# Patient Record
Sex: FEMALE | Race: WHITE | Employment: OTHER | URBAN - METROPOLITAN AREA
[De-identification: names, ages, dates, MRNs, and addresses within clinical notes are randomized per-mention and may not be internally consistent; named-entity substitution may affect disease eponyms.]

---

## 2017-01-13 ENCOUNTER — OFFICE VISIT (OUTPATIENT)
Dept: CARDIOLOGY | Facility: CLINIC | Age: 72
End: 2017-01-13
Attending: INTERNAL MEDICINE
Payer: COMMERCIAL

## 2017-01-13 VITALS
HEART RATE: 60 BPM | HEIGHT: 67 IN | BODY MASS INDEX: 22.6 KG/M2 | WEIGHT: 144 LBS | DIASTOLIC BLOOD PRESSURE: 72 MMHG | SYSTOLIC BLOOD PRESSURE: 136 MMHG

## 2017-01-13 DIAGNOSIS — I47.10 PAROXYSMAL SUPRAVENTRICULAR TACHYCARDIA (H): ICD-10-CM

## 2017-01-13 PROCEDURE — 99213 OFFICE O/P EST LOW 20 MIN: CPT | Performed by: INTERNAL MEDICINE

## 2017-01-13 NOTE — PROGRESS NOTES
REASON FOR CLINIC VISIT:  Followup palpitations.      HISTORY OF PRESENT ILLNESS:  Ms. Lopez is a very pleasant 71-year-old female with a history of SVT back in 2011 who I saw in 11/2015 at [facility] when she was admitted with palpitations that lasted for several hours.  By the time she came to the ER, she was in sinus rhythm.  She had mild flat troponin elevation and underwent a Lexiscan stress test which showed normal perfusion.  Normal LVEF.  Telemetry in the hospital did not reveal any arrhythmia.  She was discharged on a 30-day event monitor that showed only sinus rhythm.  She has been seen in followup and has on quite well.  I am seeing her in about a year's time and she had only 1 episode of palpitations few days ago and she actually put her head in ice water and the palpitations disappeared.  Otherwise, she feels quite well.  She has changed her gym and walks quite a lot and climb stairs and with none of the physical activity she has noticed any chest discomfort or shortness of breath.  To be noted, she has a very strong family history of premature coronary artery disease with father having an MI at age 40 and several brothers with angina and angioplasty in 50s.  She herself has other comorbidities of hypertension and dyslipidemia and is on metoprolol and simvastatin.      PHYSICAL EXAMINATION:   VITAL SIGNS:  Blood pressure 136/72, heart 60, regular, weight 144 pounds, BMI 22.6.   GENERAL:  The patient appears pleasant, comfortable.   NECK:  Normal JVP, no bruit.   CARDIOVASCULAR SYSTEM:  S1, S2 normal, no murmur, rub or gallop.   RESPIRATORY SYSTEM:  Clear to auscultation bilaterally.   ABDOMEN:  Soft, nontender.   EXTREMITIES:  No pitting pedal edema.   NEUROLOGICAL:  Alert, oriented x3.   PSYCH:  Normal affect.   SKIN:  No obvious rash.   HEENT:  No pallor or icterus.      IMPRESSION AND PLAN:  A very delightful 71-year-old female with history of SVT in 2011 with very few breakthrough  episodes; 1 episode in the last 1 year and that responded to aborting manually as noted above.  Overall, she is quite happy with her quality of life.  We have discussed the option of ablation in the past, but for now we will just continue low-dose beta blocker.  I offered to see her on an as-needed basis but patient would prefer to come back and see us in 1 year's time and I would be very glad to see her.  In the interim, if she notices any more episodes, she will call our clinic.         ERICA NERI MD             D: 2017 10:03   T: 2017 14:03   MT: REGAN      Name:     AYALA DILLARD   MRN:      [id number]        Account:      [id number]   :      1945           Service Date: 2017      Document: [id number]

## 2017-01-13 NOTE — Clinical Note
1/13/2017    Soco Durham MD  [facility]   [address]  [facility] [address]    RE: Lisa Lopez       Dear Colleague,    I had the pleasure of seeing Lisa Lopez in the [facility].    REASON FOR CLINIC VISIT:  Followup palpitations.      Ms. Lopze is a very pleasant 71-year-old female with a history of SVT back in 2011 who I saw in 11/2015 at [facility] when she was admitted with palpitations that lasted for several hours.  By the time she came to the ER, she was in sinus rhythm.  She had mild flat troponin elevation and underwent a Lexiscan stress test which showed normal perfusion.  Normal LVEF.  Telemetry in the hospital did not reveal any arrhythmia.  She was discharged on a 30-day event monitor that showed only sinus rhythm.  She has been seen in followup and has on quite well.  I am seeing her in about a year's time and she had only 1 episode of palpitations few days ago and she actually put her head in ice water and the palpitations disappeared.  Otherwise, she feels quite well.  She has changed her gym and walks quite a lot and climb stairs and with none of the physical activity she has noticed any chest discomfort or shortness of breath.  To be noted, she has a very strong family history of premature coronary artery disease with father having an MI at age 40 and several brothers with angina and angioplasty in 50s.  She herself has other comorbidities of hypertension and dyslipidemia and is on metoprolol and simvastatin.      PHYSICAL EXAMINATION:   VITAL SIGNS:  Blood pressure 136/72, heart 60, regular, weight 144 pounds, BMI 22.6.   GENERAL:  The patient appears pleasant, comfortable.   NECK:  Normal JVP, no bruit.   CARDIOVASCULAR SYSTEM:  S1, S2 normal, no murmur, rub or gallop.   RESPIRATORY SYSTEM:  Clear to auscultation bilaterally.   ABDOMEN:  Soft, nontender.   EXTREMITIES:  No pitting pedal edema.    NEUROLOGICAL:  Alert, oriented x3.   PSYCH:  Normal affect.   SKIN:  No obvious rash.   HEENT:  No pallor or icterus.     Outpatient Encounter Prescriptions as of 1/13/2017   Medication Sig Dispense Refill     metoprolol (LOPRESSOR) 25 MG tablet Take 0.5 tablets (12.5 mg) by mouth 2 times daily 30 tablet 5     simvastatin (ZOCOR) 20 MG tablet Take 0.5 tablets (10 mg) by mouth At Bedtime 45 tablet 3     LORazepam (ATIVAN) 0.5 MG tablet Take 0.5 tablets (0.25 mg) by mouth daily 45 tablet 1     multivitamin, therapeutic with minerals (MULTI-VITAMIN) TABS Take 1 tablet by mouth daily 100 tablet 3     fish oil-omega-3 fatty acids (OMEGA 3) 1000 MG capsule Take 1 g by mouth 2 times daily  90 capsule      Cholecalciferol (VITAMIN D) 2000 UNITS tablet Take 2,000 Units by mouth daily 100 tablet 3     Coenzyme Q10 (CO Q 10) 100 MG CAPS Take 1 capsule by mouth daily        aspirin 81 MG tablet Take 81 mg by mouth daily  30 tablet      [DISCONTINUED] IBANdronate (BONIVA) 150 MG tablet Take 1 tablet (150 mg) by mouth every 30 days Take with over 8 ounces water and stay upright for at least 30 minutes after dose.  Take at least 60 minutes before breakfast 4 tablet 0     No facility-administered encounter medications on file as of 1/13/2017.      IMPRESSION AND PLAN:  A very delightful 71-year-old female with history of SVT in 2011 with very few breakthrough episodes; 1 episode in the last 1 year and that responded to aborting manually as noted above.  Overall, she is quite happy with her quality of life.  We have discussed the option of ablation in the past, but for now we will just continue low-dose beta blocker.  I offered to see her on an as-needed basis but patient would prefer to come back and see us in 1 year's time and I would be very glad to see her.  In the interim, if she notices any more episodes, she will call our clinic.     Again, thank you for allowing me to participate in the care of your patient.       Sincerely,    Gary Aviles MD     [facility]

## 2017-01-13 NOTE — PROGRESS NOTES
HPI and Plan:   See dictation    Orders Placed This Encounter   Procedures     Follow-Up with Cardiologist     No orders of the defined types were placed in this encounter.     Medications Discontinued During This Encounter   Medication Reason     IBANdronate (BONIVA) 150 MG tablet          Encounter Diagnosis   Name Primary?     Paroxysmal supraventricular tachycardia        CURRENT MEDICATIONS:  Current Outpatient Prescriptions   Medication Sig Dispense Refill     metoprolol (LOPRESSOR) 25 MG tablet Take 0.5 tablets (12.5 mg) by mouth 2 times daily 30 tablet 5     simvastatin (ZOCOR) 20 MG tablet Take 0.5 tablets (10 mg) by mouth At Bedtime 45 tablet 3     LORazepam (ATIVAN) 0.5 MG tablet Take 0.5 tablets (0.25 mg) by mouth daily 45 tablet 1     multivitamin, therapeutic with minerals (MULTI-VITAMIN) TABS Take 1 tablet by mouth daily 100 tablet 3     fish oil-omega-3 fatty acids (OMEGA 3) 1000 MG capsule Take 1 g by mouth 2 times daily  90 capsule      Cholecalciferol (VITAMIN D) 2000 UNITS tablet Take 2,000 Units by mouth daily 100 tablet 3     Coenzyme Q10 (CO Q 10) 100 MG CAPS Take 1 capsule by mouth daily        aspirin 81 MG tablet Take 81 mg by mouth daily  30 tablet        ALLERGIES   No Known Allergies    PAST MEDICAL HISTORY:  Past Medical History   Diagnosis Date     Cough 4/15/2014     Dysthymia 4/15/2014     Essential hypertension, benign 4/15/2014     Hypercholesteremia 4/15/2014     Paroxysmal supraventricular tachycardia (H) 4/15/2014       PAST SURGICAL HISTORY:  Past Surgical History   Procedure Laterality Date     Hysterectomy, pap no longer indicated         FAMILY HISTORY:  Family History   Problem Relation Age of Onset     DIABETES Father 60      66 yo     HEART DISEASE Mother       66 yo      HEART DISEASE Brother       75 yo     HEART DISEASE Brother       76 yo     HEART DISEASE Brother       76 yo      HEART DISEASE Brother 67     Triple bypass      Family History Negative Sister       62 yo MVA      Family History Negative Sister      Family History Negative Daughter      Family History Negative Daughter        SOCIAL HISTORY:  Social History     Social History     Marital Status:      Spouse Name: N/A     Number of Children: N/A     Years of Education: N/A     Occupational History      Retired     Social History Main Topics     Smoking status: Former Smoker     Start date: 04/15/1974     Smokeless tobacco: None      Comment: Smoked [age] yo.     Alcohol Use: 0.0 oz/week     0 Standard drinks or equivalent per week     Drug Use: No     Sexual Activity:     Partners: Male     Other Topics Concern     Caffeine Concern No     not caffeine      Special Diet No     regular diet      Exercise Yes     3 times a week at gym      Social History Narrative       Review of Systems:  Skin:  Negative     Eyes:  Negative    ENT:  Negative    Respiratory:  Negative    Cardiovascular:  Negative for    Gastroenterology: Negative for    Genitourinary:  Negative for    Musculoskeletal:  Negative for    Neurologic:  Negative for    Psychiatric:  Positive for sleep disturbances  Heme/Lymph/Imm:  Negative    Endocrine:  Negative for        Recent Lab Results:  LIPID RESULTS:  Lab Results   Component Value Date    CHOL 177 2016    HDL 58 2016    LDL 99 2016    TRIG 98 2016    CHOLHDLRATIO 3.4 2015       LIVER ENZYME RESULTS:  Lab Results   Component Value Date    AST 20 2016    ALT 31 2016       CBC RESULTS:  Lab Results   Component Value Date    WBC 6.0 2016    RBC 4.40 2016    HGB 13.7 2016    HCT 41.4 2016    MCV 94 2016    MCH 31.1 2016    MCHC 33.1 2016    RDW 12.8 2016     2016       BMP RESULTS:  Lab Results   Component Value Date     2016    POTASSIUM 4.2 2016    CHLORIDE 106 2016    CO2 26 2016    ANIONGAP 8 2016    GLC 85  11/09/2016    BUN 17 11/09/2016    CR 0.85 11/09/2016    GFRESTIMATED 65 11/09/2016    GFRESTBLACK 79 11/09/2016    MARIA DEL CARMEN 9.4 11/09/2016        A1C RESULTS:  No results found for: A1C    INR RESULTS:  No results found for: INR        CC  Gary Aviles MD   [facility]  [address]  [place] [id number]

## 2017-01-17 ENCOUNTER — TELEPHONE (OUTPATIENT)
Dept: INTERNAL MEDICINE | Facility: CLINIC | Age: 72
End: 2017-01-17

## 2017-01-17 NOTE — TELEPHONE ENCOUNTER
Call Regarding Preventive Health Screening Colonoscopy    Attempt 1    Message on voicemail     Comments:       Outreach   Clementina Wagoner

## 2017-01-25 NOTE — TELEPHONE ENCOUNTER
1/24/2017    Call Regarding Preventive Health Screening Colonoscopy    Attempt 2    Message     Comments: SCHEDULED        Outreach   Aidee Rao

## 2017-02-27 ENCOUNTER — MYC REFILL (OUTPATIENT)
Dept: INTERNAL MEDICINE | Facility: CLINIC | Age: 72
End: 2017-02-27

## 2017-02-27 DIAGNOSIS — F34.1 DYSTHYMIA: ICD-10-CM

## 2017-03-01 RX ORDER — LORAZEPAM 0.5 MG/1
0.25 TABLET ORAL DAILY
Qty: 45 TABLET | Refills: 1 | Status: SHIPPED | OUTPATIENT
Start: 2017-03-01 | End: 2017-09-05

## 2017-03-01 NOTE — TELEPHONE ENCOUNTER
Ativan      Last Written Prescription Date:  9/6/16  Last Fill Quantity: 45,   # refills: 1  Last Office Visit with [facility] prescribing provider: 11/9/16  Future Office visit:       Routing refill request to provider for review/approval because:  Drug not on the [facility] refill protocol or controlled substance

## 2017-03-01 NOTE — TELEPHONE ENCOUNTER
Message from MyChart:  Original authorizing provider: MD Lisa Radford would like a refill of the following medications:  LORazepam (ATIVAN) 0.5 MG tablet [Soco Durham MD]    Preferred pharmacy: [facility] [address] [place] [address] [id number] AT [place] [address]    Comment:

## 2017-03-01 NOTE — TELEPHONE ENCOUNTER
Rx faxed to [facility].  JOSE MIGUEL Castellanos R.N.

## 2017-03-23 ENCOUNTER — HOSPITAL ENCOUNTER (OUTPATIENT)
Facility: CLINIC | Age: 72
Discharge: HOME | End: 2017-03-23
Attending: SURGERY | Admitting: SURGERY
Payer: MEDICARE

## 2017-03-23 ENCOUNTER — APPOINTMENT (OUTPATIENT)
Dept: SURGERY | Facility: PHYSICIAN GROUP | Age: 72
End: 2017-03-23
Payer: COMMERCIAL

## 2017-03-23 VITALS
OXYGEN SATURATION: 95 % | DIASTOLIC BLOOD PRESSURE: 68 MMHG | RESPIRATION RATE: 12 BRPM | SYSTOLIC BLOOD PRESSURE: 152 MMHG

## 2017-03-23 LAB — COLONOSCOPY: NORMAL

## 2017-03-23 PROCEDURE — 25000128 H RX IP 250 OP 636: Performed by: SURGERY

## 2017-03-23 PROCEDURE — 45380 COLONOSCOPY AND BIOPSY: CPT | Mod: PT | Performed by: SURGERY

## 2017-03-23 PROCEDURE — 45385 COLONOSCOPY W/LESION REMOVAL: CPT | Mod: PT | Performed by: SURGERY

## 2017-03-23 PROCEDURE — G0500 MOD SEDAT ENDO SERVICE >5YRS: HCPCS | Performed by: SURGERY

## 2017-03-23 PROCEDURE — 45380 COLONOSCOPY AND BIOPSY: CPT | Mod: XU,PT | Performed by: SURGERY

## 2017-03-23 PROCEDURE — 25000125 ZZHC RX 250: Performed by: SURGERY

## 2017-03-23 PROCEDURE — 45385 COLONOSCOPY W/LESION REMOVAL: CPT | Performed by: SURGERY

## 2017-03-23 PROCEDURE — 88305 TISSUE EXAM BY PATHOLOGIST: CPT | Mod: 26 | Performed by: SURGERY

## 2017-03-23 PROCEDURE — 88305 TISSUE EXAM BY PATHOLOGIST: CPT | Performed by: SURGERY

## 2017-03-23 PROCEDURE — 99153 MOD SED SAME PHYS/QHP EA: CPT | Performed by: SURGERY

## 2017-03-23 RX ORDER — FENTANYL CITRATE 50 UG/ML
INJECTION, SOLUTION INTRAMUSCULAR; INTRAVENOUS PRN
Status: DISCONTINUED | OUTPATIENT
Start: 2017-03-23 | End: 2017-03-23 | Stop reason: HOSPADM

## 2017-03-23 RX ORDER — ONDANSETRON 4 MG/1
4 TABLET, ORALLY DISINTEGRATING ORAL EVERY 6 HOURS PRN
Status: DISCONTINUED | OUTPATIENT
Start: 2017-03-23 | End: 2017-03-23 | Stop reason: HOSPADM

## 2017-03-23 RX ORDER — LIDOCAINE 40 MG/G
CREAM TOPICAL
Status: DISCONTINUED | OUTPATIENT
Start: 2017-03-23 | End: 2017-03-23 | Stop reason: HOSPADM

## 2017-03-23 RX ORDER — ONDANSETRON 2 MG/ML
4 INJECTION INTRAMUSCULAR; INTRAVENOUS EVERY 6 HOURS PRN
Status: DISCONTINUED | OUTPATIENT
Start: 2017-03-23 | End: 2017-03-23 | Stop reason: HOSPADM

## 2017-03-23 RX ORDER — NALOXONE HYDROCHLORIDE 0.4 MG/ML
.1-.4 INJECTION, SOLUTION INTRAMUSCULAR; INTRAVENOUS; SUBCUTANEOUS
Status: DISCONTINUED | OUTPATIENT
Start: 2017-03-23 | End: 2017-03-23 | Stop reason: HOSPADM

## 2017-03-23 RX ORDER — ONDANSETRON 2 MG/ML
4 INJECTION INTRAMUSCULAR; INTRAVENOUS
Status: DISCONTINUED | OUTPATIENT
Start: 2017-03-23 | End: 2017-03-23 | Stop reason: HOSPADM

## 2017-03-23 RX ORDER — FLUMAZENIL 0.1 MG/ML
0.2 INJECTION, SOLUTION INTRAVENOUS
Status: DISCONTINUED | OUTPATIENT
Start: 2017-03-23 | End: 2017-03-23 | Stop reason: HOSPADM

## 2017-03-23 NOTE — DISCHARGE INSTRUCTIONS
Understanding Colon and Rectal Polyps     The colon has a smooth lining composed of millions of cells.     The colon (also called the large intestine) is a muscular tube that forms the last part of the digestive tract. It absorbs water and stores food waste. The colon is about 4 to 6 feet long. The rectum is the last 6 inches of the colon. The colon and rectum have a smooth lining composed of millions of cells. Changes in these cells can lead to growths in the colon that can become cancerous and should be removed.     When the Colon Lining Changes  Changes that occur in the cells that line the colon or rectum can lead to growths called polyps. Over a period of years, polyps can turn cancerous. Removing polyps early may prevent cancer from ever forming.      Polyps  Polyps are fleshy clumps of tissue that form on the lining of the colon or rectum. Small polyps are usually benign (not cancerous). However, over time, cells in a polyp can change and become cancerous. The larger a polyp grows, the more likely this is to happen. Also, certain types of polyps known as adenomatous polyps are considered premalignant. This means that they will almost always become cancerous if they re not removed.          Cancer  Almost all colorectal cancers start when polyp cells begin growing abnormally. As a cancerous tumor grows, it may involve more and more of the colon or rectum. In time, cancer can also grow beyond the colon or rectum and spread to nearby organs or to glands called lymph nodes. The cells can also travel to other parts of the body. This is known as metastasis. The earlier a cancerous tumor is removed, the better the chance of preventing its spread.        [id number] [facility], [address] [place] [address], [place] [address]. All rights reserved. This information is not intended as a substitute for professional medical care. Always follow your healthcare professional's instructions.

## 2017-03-23 NOTE — H&P
Pre-Endoscopy History and Physical     Lisa Lopez MRN# [id number]   YOB: 1945 Age: 71 year old     Date of Procedure: 3/23/2017  Primary care provider: Soco Durham  Type of Endoscopy: Colonoscopy with possible biopsy, possible polypectomy  Reason for Procedure: screening   Type of Anesthesia Anticipated: Conscious Sedation    HPI:    Lisa is a 71 year old female who will be undergoing the above procedure.      A history and physical has been performed. The patient's medications and allergies have been reviewed. The risks and benefits of the procedure and the sedation options and risks were discussed with the patient.  All questions were answered and informed consent was obtained.      She denies a personal or family history of anesthesia complications or bleeding disorders.     Patient Active Problem List   Diagnosis     Advance care planning     Paroxysmal supraventricular tachycardia (H)     Hypercholesteremia     Dysthymia     Essential hypertension, benign     Osteoporosis        Past Medical History:   Diagnosis Date     Cough 4/15/2014     Dysthymia 4/15/2014     Essential hypertension, benign 4/15/2014     Hypercholesteremia 4/15/2014     Paroxysmal supraventricular tachycardia (H) 4/15/2014        Past Surgical History:   Procedure Laterality Date     HYSTERECTOMY, PAP NO LONGER INDICATED         Social History   Substance Use Topics     Smoking status: Former Smoker     Start date: 4/15/1974     Smokeless tobacco: Not on file      Comment: Smoked [age] yo.     Alcohol use 0.0 oz/week     0 Standard drinks or equivalent per week       Family History   Problem Relation Age of Onset     DIABETES Father 60      66 yo     HEART DISEASE Mother       66 yo      HEART DISEASE Brother       77 yo     HEART DISEASE Brother       78 yo     HEART DISEASE Brother       78 yo      HEART DISEASE Brother 67     Triple bypass     Family History Negative  "Sister       62 yo MVA      Family History Negative Sister      Family History Negative Daughter      Family History Negative Daughter        Prior to Admission medications    Medication Sig Start Date End Date Taking? Authorizing Provider   LORazepam (ATIVAN) 0.5 MG tablet Take 0.5 tablets (0.25 mg) by mouth daily 3/1/17  Yes Soco Durham MD   metoprolol (LOPRESSOR) 25 MG tablet Take 0.5 tablets (12.5 mg) by mouth 2 times daily 16  Yes Soco Durham MD   simvastatin (ZOCOR) 20 MG tablet Take 0.5 tablets (10 mg) by mouth At Bedtime 10/26/16  Yes Soco Durham MD   multivitamin, therapeutic with minerals (MULTI-VITAMIN) TABS Take 1 tablet by mouth daily 4/15/14  Yes Soco Durham MD   fish oil-omega-3 fatty acids (OMEGA 3) 1000 MG capsule Take 1 g by mouth 2 times daily  4/15/14  Yes Soco Durham MD   Cholecalciferol (VITAMIN D) 2000 UNITS tablet Take 2,000 Units by mouth daily 4/15/14  Yes Soco Durham MD   aspirin 81 MG tablet Take 81 mg by mouth daily  4/15/14  Yes Soco Durham MD   Coenzyme Q10 (CO Q 10) 100 MG CAPS Take 1 capsule by mouth daily  4/15/14   Soco Durham MD       No Known Allergies     REVIEW OF SYSTEMS:   5 point ROS negative except as noted above in HPI, including Gen., Resp., CV, GI &  system review.    PHYSICAL EXAM:   There were no vitals taken for this visit. Estimated body mass index is 22.55 kg/(m^2) as calculated from the following:    Height as of 17: 1.702 m (5' 7\").    Weight as of 17: 65.3 kg (144 lb).   GENERAL APPEARANCE: alert, and oriented  MENTAL STATUS: alert  AIRWAY EXAM: Mallampatti Class III (visualization of the soft palate and base of uvula)  RESP: lungs clear to auscultation - no rales, rhonchi or wheezes  CV: regular rates and rhythm  DIAGNOSTICS:    Not indicated    IMPRESSION   ASA Class 3 - Severe systemic disease, but not incapacitating    PLAN:   Plan for Colonoscopy with possible biopsy, " possible polypectomy. We discussed the risks, benefits and alternatives and the patient wished to proceed.      Signed Electronically by: Devante Sanchez  March 23, 2017

## 2017-03-24 LAB — COPATH REPORT: NORMAL

## 2017-05-03 DIAGNOSIS — I47.10 PAROXYSMAL SUPRAVENTRICULAR TACHYCARDIA (H): ICD-10-CM

## 2017-05-03 RX ORDER — METOPROLOL TARTRATE 25 MG/1
12.5 TABLET, FILM COATED ORAL 2 TIMES DAILY
Qty: 30 TABLET | Refills: 5 | Status: SHIPPED | OUTPATIENT
Start: 2017-05-03 | End: 2017-11-06

## 2017-05-03 NOTE — TELEPHONE ENCOUNTER
Metoprolol      Last Written Prescription Date: 11/16/16  Last Fill Quantity: 30, # refills: 5  [facility] Office Visit with [facility] prescribing provider: 11/9/16       Potassium   Date Value Ref Range Status   11/09/2016 4.2 3.4 - 5.3 mmol/L Final     Creatinine   Date Value Ref Range Status   11/09/2016 0.85 0.52 - 1.04 mg/dL Final     BP Readings from Last 3 Encounters:   03/23/17 152/68   01/13/17 136/72   11/09/16 124/84

## 2017-06-01 ENCOUNTER — MYC MEDICAL ADVICE (OUTPATIENT)
Dept: INTERNAL MEDICINE | Facility: CLINIC | Age: 72
End: 2017-06-01

## 2017-06-14 ENCOUNTER — OFFICE VISIT (OUTPATIENT)
Dept: INTERNAL MEDICINE | Facility: CLINIC | Age: 72
End: 2017-06-14
Payer: COMMERCIAL

## 2017-06-14 VITALS — DIASTOLIC BLOOD PRESSURE: 80 MMHG | SYSTOLIC BLOOD PRESSURE: 140 MMHG

## 2017-06-14 DIAGNOSIS — M81.0 OSTEOPOROSIS: Primary | ICD-10-CM

## 2017-06-14 PROCEDURE — 99213 OFFICE O/P EST LOW 20 MIN: CPT | Performed by: INTERNAL MEDICINE

## 2017-06-14 NOTE — NURSING NOTE
"Chief Complaint   Patient presents with     RECHECK     She was on Ibandronate sodium and here for a follow up. She has toothace from this medication and would like to switch to a different med.       Initial Pulse (P) 68  Temp (P) 98.3  F (36.8  C) (Oral)  Ht (P) 5' 5.75\" (1.67 m)  Wt (P) 145 lb (65.8 kg)  SpO2 (P) 97%  BMI (P) 23.58 kg/m2 Estimated body mass index is 23.58 kg/(m^2) (pended) as calculated from the following:    Height as of this encounter: (P) 5' 5.75\" (1.67 m).    Weight as of this encounter: (P) 145 lb (65.8 kg).  Medication Reconciliation: complete   Zahra Enriquez MA      "

## 2017-06-14 NOTE — MR AVS SNAPSHOT
After Visit Summary   6/14/2017    Lisa Lopez    MRN: [id number]           Patient Information     Date Of Birth          1945        Visit Information        Provider Department      6/14/2017 8:00 AM Soco Durham MD [facility]        Today's Diagnoses     Osteoporosis    -  1       Follow-ups after your visit        Future tests that were ordered for you today     Open Future Orders        Priority Expected Expires Ordered    DX Hip/Pelvis/Spine Routine  6/14/2018 6/14/2017            Who to contact     If you have questions or need follow up information about today's clinic visit or your schedule please contact [facility] directly at [phone].  Normal or non-critical lab and imaging results will be communicated to you by MyChart, letter or phone within 4 business days after the clinic has received the results. If you do not hear from us within 7 days, please contact the clinic through [email or web address] or phone. If you have a critical or abnormal lab result, we will notify you by phone as soon as possible.  Submit refill requests through [employer] or call your pharmacy and they will forward the refill request to us. Please allow 3 business days for your refill to be completed.          Additional Information About Your Visit        MyChart Information     [employer] gives you secure access to your electronic health record. If you see a primary care provider, you can also send messages to your care team and make appointments. If you have questions, please call your primary care clinic.  If you do not have a primary care provider, please call [phone] and they will assist you.        Care EveryWhere ID     This is your Care EveryWhere ID. This could be used by other organizations to access your [facility] medical records  [id number]         Blood Pressure from Last 3 Encounters:   06/14/17 140/80   03/23/17 152/68   01/13/17 136/72    Weight from Last 3  Encounters:   06/14/17 (P) 145 lb (65.8 kg)   01/13/17 144 lb (65.3 kg)   11/09/16 142 lb (64.4 kg)               Primary Care Provider Office Phone # Fax #    Soco Durham [phone]       [facility] [address]  [facility] [address]        Thank you!     Thank you for choosing [facility]  for your care. Our goal is always to provide you with excellent care. Hearing back from our patients is one way we can continue to improve our services. Please take a few minutes to complete the written survey that you may receive in the mail after your visit with us. Thank you!             Your Updated Medication List - Protect others around you: Learn how to safely use, store and throw away your medicines at www.disposemymeds.org.          This list is accurate as of: 6/14/17 11:35 AM.  Always use your most recent med list.                   Brand Name Dispense Instructions for use    aspirin 81 MG tablet     30 tablet    Take 81 mg by mouth daily       Co Q 10 100 MG Caps      Take 1 capsule by mouth daily       fish oil-omega-3 fatty acids 1000 MG capsule     90 capsule    Take 1 g by mouth 2 times daily       LORazepam 0.5 MG tablet    ATIVAN    45 tablet    Take 0.5 tablets (0.25 mg) by mouth daily       metoprolol 25 MG tablet    LOPRESSOR    30 tablet    Take 0.5 tablets (12.5 mg) by mouth 2 times daily       Multi-vitamin Tabs tablet     100 tablet    Take 1 tablet by mouth daily       simvastatin 20 MG tablet    ZOCOR    45 tablet    Take 0.5 tablets (10 mg) by mouth At Bedtime       vitamin D 2000 UNITS tablet     100 tablet    Take 2,000 Units by mouth daily

## 2017-06-14 NOTE — PROGRESS NOTES
SUBJECTIVE:                                                    Lisa Lopez is a 71 year old female who presents to clinic today for the following health issues:    She was on Ibandronate Sodium and it's making her tooth ache. She would like to switch to a new medication.    HPI:   She says her toothache is better had it a couple times after she stopped the medication in February. But is not sure just when her last toothache was.     She does not recall what she was on before she started the Boniva. Thinks she was on something prior that may have been stopped because of heartburn. She has been on Boniva since . She has not had a DEXA since .    Problem list and histories reviewed & adjusted, as indicated.  Additional history: as documented    Patient Active Problem List   Diagnosis     Advance care planning     Paroxysmal supraventricular tachycardia (H)     Hypercholesteremia     Dysthymia     Essential hypertension, benign     Osteoporosis     Past Surgical History:   Procedure Laterality Date     COLONOSCOPY N/A 3/23/2017    Procedure: COMBINED COLONOSCOPY, SINGLE OR MULTIPLE BIOPSY/POLYPECTOMY BY BIOPSY;  Surgeon: Devante Sanchez MD;  Location: [facility]     HYSTERECTOMY, PAP NO LONGER INDICATED         Social History   Substance Use Topics     Smoking status: Former Smoker     Start date: 4/15/1974     Smokeless tobacco: Not on file      Comment: Smoked [age] yo.     Alcohol use 0.0 oz/week     0 Standard drinks or equivalent per week     Family History   Problem Relation Age of Onset     DIABETES Father 60      68 yo     HEART DISEASE Mother       68 yo      HEART DISEASE Brother       75 yo     HEART DISEASE Brother       78 yo     HEART DISEASE Brother       78 yo      HEART DISEASE Brother 67     Triple bypass     Family History Negative Sister       60 yo MVA      Family History Negative Sister      Family History Negative Daughter       "Family History Negative Daughter            Reviewed and updated as needed this visit by clinical staff  Tobacco  Allergies  Meds  Med Hx  Surg Hx  Fam Hx  Soc Hx      Reviewed and updated as needed this visit by Provider         ROS:  Constitutional, HEENT, cardiovascular, pulmonary, GI, , musculoskeletal, neuro, skin, endocrine and psych systems are negative, except as otherwise noted.    OBJECTIVE:                                                    /80 (BP Location: Right arm, Patient Position: Sitting, Cuff Size: Adult Regular)  Pulse (P) 68  Temp (P) 98.3  F (36.8  C) (Oral)  Ht (P) 5' 5.75\" (1.67 m)  Wt (P) 145 lb (65.8 kg)  SpO2 (P) 97%  BMI (P) 23.58 kg/m2  Body mass index is 23.58 kg/(m^2) (pended).  GENERAL: healthy, alert and no distress  RESP: lungs clear to auscultation - no rales, rhonchi or wheezes  CV: regular rate and rhythm, normal S1 S2, no S3 or S4, no murmur, click or rub, no peripheral edema and peripheral pulses strong  ABDOMEN: soft, nontender, no hepatosplenomegaly, no masses and bowel sounds normal  MS: no gross musculoskeletal defects noted, no edema         ASSESSMENT/PLAN:                                                        1. Osteoporosis  Will check DEXA if stable will consider drug holiday for a few years.   - DX Hip/Pelvis/Spine; Future    Soco Durham MD  [facility]    "

## 2017-06-15 ENCOUNTER — TELEPHONE (OUTPATIENT)
Dept: BONE DENSITY | Facility: CLINIC | Age: 72
End: 2017-06-15

## 2017-07-06 ENCOUNTER — RADIANT APPOINTMENT (OUTPATIENT)
Dept: BONE DENSITY | Facility: CLINIC | Age: 72
End: 2017-07-06
Attending: INTERNAL MEDICINE
Payer: COMMERCIAL

## 2017-07-06 DIAGNOSIS — M81.0 OSTEOPOROSIS: ICD-10-CM

## 2017-07-06 PROCEDURE — 77085 DXA BONE DENSITY AXL VRT FX: CPT | Performed by: INTERNAL MEDICINE

## 2017-08-14 ENCOUNTER — MYC MEDICAL ADVICE (OUTPATIENT)
Dept: INTERNAL MEDICINE | Facility: CLINIC | Age: 72
End: 2017-08-14

## 2017-08-14 ENCOUNTER — MYC REFILL (OUTPATIENT)
Dept: INTERNAL MEDICINE | Facility: CLINIC | Age: 72
End: 2017-08-14

## 2017-08-14 DIAGNOSIS — F34.1 DYSTHYMIA: ICD-10-CM

## 2017-08-14 RX ORDER — LORAZEPAM 0.5 MG/1
0.25 TABLET ORAL DAILY
Qty: 45 TABLET | Refills: 1 | Status: CANCELLED | OUTPATIENT
Start: 2017-08-14

## 2017-08-14 NOTE — TELEPHONE ENCOUNTER
Message from MyChart:  Original authorizing provider: MD Lisa Radford would like a refill of the following medications:  LORazepam (ATIVAN) 0.5 MG tablet [Soco Durham MD]    Preferred pharmacy: [facility] [address] [place] [address] [id number] AT [place] [address]    Comment:

## 2017-08-21 ENCOUNTER — TELEPHONE (OUTPATIENT)
Dept: INTERNAL MEDICINE | Facility: CLINIC | Age: 72
End: 2017-08-21

## 2017-08-21 NOTE — TELEPHONE ENCOUNTER
Panel Management Review      Patient has the following on her problem list:     Depression / Dysthymia review  PHQ-9 SCORE 5/14/2014 12/28/2015 10/19/2016   Total Score 0 - -   Total Score - 0 0      Patient is due for:  PHQ9 and DAP      IVD   ASA: Passed    Last LDL:    Lab Results   Component Value Date    CHOL 177 11/09/2016     Lab Results   Component Value Date    HDL 58 11/09/2016     Lab Results   Component Value Date    LDL 99 11/09/2016     Lab Results   Component Value Date    TRIG 98 11/09/2016        Lab Results   Component Value Date    CHOLHDLRATIO 3.4 08/18/2015        Is the patient on a Statin? YES   Is the patient on Aspirin? YES                  Medications     HMG CoA Reductase Inhibitors    simvastatin (ZOCOR) 20 MG tablet    Salicylates    aspirin 81 MG tablet          Last three blood pressure readings:  BP Readings from Last 3 Encounters:   06/14/17 140/80   03/23/17 152/68   01/13/17 136/72        Tobacco History:     History   Smoking Status     Former Smoker     Start date: 4/15/1974   Smokeless Tobacco     Not on file     Comment: Smoked 24-29 yo.       Hypertension   Last three blood pressure readings:  BP Readings from Last 3 Encounters:   06/14/17 140/80   03/23/17 152/68   01/13/17 136/72     Blood pressure: Passed    HTN Guidelines:  Age 18-59 BP range:  Less than 140/90  Age 60-85 with Diabetes:  Less than 140/90  Age 60-85 without Diabetes:  less than 150/90            Composite cancer screening  Chart review shows that this patient is due/due soon for the following None  Summary:    Patient is due/failing the following:   BP CHECK? Physical, DAP and PHQ9    Action needed:   Patient needs office visit for as above.     Type of outreach:    Phone, spoke to patient.  PHQ=0 Agrees to schedule next appointment for Medicare Wellness/BP check.    Questions for provider review:    None                                                                            MITCHELL Sorenson LPN                                                   Chart routed to none.

## 2017-09-05 DIAGNOSIS — F34.1 DYSTHYMIA: ICD-10-CM

## 2017-09-05 RX ORDER — LORAZEPAM 0.5 MG/1
0.25 TABLET ORAL DAILY
Qty: 45 TABLET | Refills: 1 | Status: SHIPPED | OUTPATIENT
Start: 2017-09-05 | End: 2018-02-26

## 2017-09-05 NOTE — TELEPHONE ENCOUNTER
Lorazepam      Last Written Prescription Date:  3/1/17  Last Fill Quantity: 45,   # refills: 1  Last Office Visit with [facility], [facility] or  Health prescribing provider: 6/14/17  Future Office visit:       Routing refill request to provider for review/approval because:  Drug not on the [facility], [facility] or  [facility] refill protocol or controlled substance

## 2017-09-19 ENCOUNTER — HOSPITAL ENCOUNTER (OUTPATIENT)
Dept: MAMMOGRAPHY | Facility: CLINIC | Age: 72
Discharge: HOME | End: 2017-09-19
Attending: INTERNAL MEDICINE | Admitting: INTERNAL MEDICINE
Payer: MEDICARE

## 2017-09-19 DIAGNOSIS — Z12.31 VISIT FOR SCREENING MAMMOGRAM: ICD-10-CM

## 2017-09-19 PROCEDURE — 77063 BREAST TOMOSYNTHESIS BI: CPT

## 2017-10-02 ENCOUNTER — E-VISIT (OUTPATIENT)
Dept: INTERNAL MEDICINE | Facility: CLINIC | Age: 72
End: 2017-10-02
Payer: COMMERCIAL

## 2017-10-02 DIAGNOSIS — R30.0 DYSURIA: Primary | ICD-10-CM

## 2017-10-02 PROCEDURE — 99444 ZZC PHYSICIAN ONLINE EVALUATION & MANAGEMENT SERVICE: CPT | Performed by: INTERNAL MEDICINE

## 2017-10-03 RX ORDER — SULFAMETHOXAZOLE/TRIMETHOPRIM 800-160 MG
1 TABLET ORAL 2 TIMES DAILY
Qty: 14 TABLET | Refills: 0 | Status: SHIPPED | OUTPATIENT
Start: 2017-10-03 | End: 2018-01-19

## 2017-10-31 DIAGNOSIS — E78.00 HYPERCHOLESTEREMIA: ICD-10-CM

## 2017-10-31 RX ORDER — SIMVASTATIN 20 MG
10 TABLET ORAL AT BEDTIME
Qty: 45 TABLET | Refills: 0 | Status: SHIPPED | OUTPATIENT
Start: 2017-10-31 | End: 2018-01-30

## 2017-10-31 NOTE — TELEPHONE ENCOUNTER
Simvastatin 10 mg     Last Written Prescription Date: 10/26/16  Last Fill Quantity: 45, # refills: 3  Last Office Visit with [facility], [facility] or [facility] prescribing provider: 6/14/17       Lab Results   Component Value Date    CHOL 177 11/09/2016     Lab Results   Component Value Date    HDL 58 11/09/2016     Lab Results   Component Value Date    LDL 99 11/09/2016     Lab Results   Component Value Date    TRIG 98 11/09/2016     Lab Results   Component Value Date    CHOLHDLRATIO 3.4 08/18/2015       Labs showing if normal/abnormal  Lab Results   Component Value Date    CHOL 177 11/09/2016    TRIG 98 11/09/2016    HDL 58 11/09/2016    LDL 99 11/09/2016    VLDL 34 (H) 08/18/2015    CHOLHDLRATIO 3.4 08/18/2015     Medication is being filled for 1 time refill only due to:  Patient needs labs Lipid.

## 2017-11-06 DIAGNOSIS — I47.10 PAROXYSMAL SUPRAVENTRICULAR TACHYCARDIA (H): ICD-10-CM

## 2017-11-07 RX ORDER — METOPROLOL TARTRATE 25 MG/1
12.5 TABLET, FILM COATED ORAL 2 TIMES DAILY
Qty: 30 TABLET | Refills: 0 | Status: SHIPPED | OUTPATIENT
Start: 2017-11-07 | End: 2017-12-06

## 2017-11-14 ENCOUNTER — E-VISIT (OUTPATIENT)
Dept: INTERNAL MEDICINE | Facility: CLINIC | Age: 72
End: 2017-11-14
Payer: COMMERCIAL

## 2017-11-14 DIAGNOSIS — R30.0 DYSURIA: Primary | ICD-10-CM

## 2017-11-14 PROCEDURE — 99207 ZZC NO CHARGE LOS: CPT | Performed by: INTERNAL MEDICINE

## 2017-11-14 NOTE — MR AVS SNAPSHOT
After Visit Summary   11/14/2017    Lisa Lopez    MRN: [id number]           Patient Information     Date Of Birth          1945        Visit Information        Provider Department      11/14/2017 11:38 AM Soco Durham MD [facility]        Today's Diagnoses     Dysuria    -  1       Follow-ups after your visit        Who to contact     If you have questions or need follow up information about today's clinic visit or your schedule please contact [facility] directly at [phone].  Normal or non-critical lab and imaging results will be communicated to you by [employer], letter or phone within 4 business days after the clinic has received the results. If you do not hear from us within 7 days, please contact the clinic through [employer] or phone. If you have a critical or abnormal lab result, we will notify you by phone as soon as possible.  Submit refill requests through [employer] or call your pharmacy and they will forward the refill request to us. Please allow 3 business days for your refill to be completed.          Additional Information About Your Visit        MyChart Information     [employer] gives you secure access to your electronic health record. If you see a primary care provider, you can also send messages to your care team and make appointments. If you have questions, please call your primary care clinic.  If you do not have a primary care provider, please call [phone] and they will assist you.        Care EveryWhere ID     This is your Care EveryWhere ID. This could be used by other organizations to access your [facility] medical records  [id number]         Blood Pressure from Last 3 Encounters:   06/14/17 140/80   03/23/17 152/68   01/13/17 136/72    Weight from Last 3 Encounters:   06/14/17 (P) 145 lb (65.8 kg)   01/13/17 144 lb (65.3 kg)   11/09/16 142 lb (64.4 kg)                 Today's Medication Changes          These changes are  accurate as of: 11/14/17 11:59 PM.  If you have any questions, ask your nurse or doctor.               Start taking these medicines.        Dose/Directions    ciprofloxacin 500 MG tablet   Commonly known as:  CIPRO   Used for:  Dysuria        Dose:  500 mg   Take 1 tablet (500 mg) by mouth 2 times daily   Quantity:  14 tablet   Refills:  0            Where to get your medicines      These medications were sent to [facility] [address] [place] [address]  [id number] [place] [id number]    Hours:  24-hours Phone:  [phone]     ciprofloxacin 500 MG tablet                Primary Care Provider Office Phone # Fax #    Soco Durham [phone]       [address] [address]        Equal Access to Services     KAMRYN HAYNES : Hadii aad ku hadasho Soomaali, waaxda luqadaha, qaybta kaalmada ademerrittyadeborah, dina de la rosa. So [facility] [phone].    ATENCIÓN: Si habla español, tiene a lee disposición servicios gratuitos de asistencia lingüística. Diamond al [phone].    We comply with applicable federal civil rights laws and [place] laws. We do not discriminate on the basis of race, color, national origin, age, disability, sex, sexual orientation, or gender identity.            Thank you!     Thank you for choosing [facility]  for your care. Our goal is always to provide you with excellent care. Hearing back from our patients is one way we can continue to improve our services. Please take a few minutes to complete the written survey that you may receive in the mail after your visit with us. Thank you!             Your Updated Medication List - Protect others around you: Learn how to safely use, store and throw away your medicines at www.disposemymeds.org.          This list is accurate as of: 11/14/17 11:59 PM.  Always use your most recent med list.                    Brand Name Dispense Instructions for use Diagnosis    aspirin 81 MG tablet     30 tablet    Take 81 mg by mouth daily        ciprofloxacin 500 MG tablet    CIPRO    14 tablet    Take 1 tablet (500 mg) by mouth 2 times daily    Dysuria       Co Q 10 100 MG Caps      Take 1 capsule by mouth daily        fish oil-omega-3 fatty acids 1000 MG capsule     90 capsule    Take 1 g by mouth 2 times daily        LORazepam 0.5 MG tablet    ATIVAN    45 tablet    Take 0.5 tablets (0.25 mg) by mouth daily    Dysthymia       metoprolol 25 MG tablet    LOPRESSOR    30 tablet    Take 0.5 tablets (12.5 mg) by mouth 2 times daily    Paroxysmal supraventricular tachycardia (H)       Multi-vitamin Tabs tablet     100 tablet    Take 1 tablet by mouth daily        simvastatin 20 MG tablet    ZOCOR    45 tablet    Take 0.5 tablets (10 mg) by mouth At Bedtime    Hypercholesteremia       sulfamethoxazole-trimethoprim 800-160 MG per tablet    BACTRIM DS/SEPTRA DS    14 tablet    Take 1 tablet by mouth 2 times daily    Dysuria       vitamin D 2000 UNITS tablet     100 tablet    Take 2,000 Units by mouth daily

## 2017-11-15 DIAGNOSIS — R30.0 DYSURIA: ICD-10-CM

## 2017-11-15 LAB
ALBUMIN UR-MCNC: 30 MG/DL
APPEARANCE UR: ABNORMAL
BILIRUB UR QL STRIP: NEGATIVE
COLOR UR AUTO: YELLOW
GLUCOSE UR STRIP-MCNC: NEGATIVE MG/DL
HGB UR QL STRIP: ABNORMAL
KETONES UR STRIP-MCNC: NEGATIVE MG/DL
LEUKOCYTE ESTERASE UR QL STRIP: ABNORMAL
NITRATE UR QL: NEGATIVE
PH UR STRIP: 6 PH (ref 5–7)
RBC #/AREA URNS AUTO: >100 /HPF
SOURCE: ABNORMAL
SP GR UR STRIP: 1.02 (ref 1–1.03)
UROBILINOGEN UR STRIP-ACNC: 0.2 EU/DL (ref 0.2–1)
WBC #/AREA URNS AUTO: >100 /HPF

## 2017-11-15 PROCEDURE — 87088 URINE BACTERIA CULTURE: CPT | Performed by: INTERNAL MEDICINE

## 2017-11-15 PROCEDURE — 87186 SC STD MICRODIL/AGAR DIL: CPT | Performed by: INTERNAL MEDICINE

## 2017-11-15 PROCEDURE — 87086 URINE CULTURE/COLONY COUNT: CPT | Performed by: INTERNAL MEDICINE

## 2017-11-15 PROCEDURE — 81001 URINALYSIS AUTO W/SCOPE: CPT | Performed by: INTERNAL MEDICINE

## 2017-11-15 NOTE — TELEPHONE ENCOUNTER
Per misc reports tab under [employer] activity, pt has checked this message.    Called pt, states she saw message, but got unexpectedly called into work to help. Plans to come by this evening to collect urine.    Sent to MD as update.

## 2017-11-16 ENCOUNTER — MYC MEDICAL ADVICE (OUTPATIENT)
Dept: INTERNAL MEDICINE | Facility: CLINIC | Age: 72
End: 2017-11-16

## 2017-11-16 DIAGNOSIS — R30.0 DYSURIA: Primary | ICD-10-CM

## 2017-11-16 LAB
BACTERIA SPEC CULT: ABNORMAL
SPECIMEN SOURCE: ABNORMAL

## 2017-11-16 RX ORDER — CEPHALEXIN 500 MG/1
500 CAPSULE ORAL 2 TIMES DAILY
Qty: 20 CAPSULE | Refills: 0 | Status: SHIPPED | OUTPATIENT
Start: 2017-11-16 | End: 2018-01-19

## 2017-11-16 NOTE — TELEPHONE ENCOUNTER
Per pt's mychart message below, asking for UA results from 11-15-17.  UC still pending.    Please advise, thanks.

## 2017-11-20 RX ORDER — CIPROFLOXACIN 500 MG/1
500 TABLET, FILM COATED ORAL 2 TIMES DAILY
Qty: 14 TABLET | Refills: 0 | Status: SHIPPED | OUTPATIENT
Start: 2017-11-20 | End: 2018-01-19

## 2017-12-06 ENCOUNTER — OFFICE VISIT (OUTPATIENT)
Dept: INTERNAL MEDICINE | Facility: CLINIC | Age: 72
End: 2017-12-06
Payer: COMMERCIAL

## 2017-12-06 VITALS
HEART RATE: 68 BPM | BODY MASS INDEX: 24.06 KG/M2 | OXYGEN SATURATION: 98 % | DIASTOLIC BLOOD PRESSURE: 80 MMHG | TEMPERATURE: 97.9 F | SYSTOLIC BLOOD PRESSURE: 154 MMHG | HEIGHT: 66 IN | WEIGHT: 149.7 LBS

## 2017-12-06 DIAGNOSIS — Z00.00 ROUTINE GENERAL MEDICAL EXAMINATION AT A HEALTH CARE FACILITY: Primary | ICD-10-CM

## 2017-12-06 DIAGNOSIS — I10 ESSENTIAL HYPERTENSION, BENIGN: ICD-10-CM

## 2017-12-06 DIAGNOSIS — M25.562 LEFT KNEE PAIN, UNSPECIFIED CHRONICITY: ICD-10-CM

## 2017-12-06 DIAGNOSIS — I47.10 PAROXYSMAL SUPRAVENTRICULAR TACHYCARDIA (H): ICD-10-CM

## 2017-12-06 LAB
ERYTHROCYTE [DISTWIDTH] IN BLOOD BY AUTOMATED COUNT: 13.2 % (ref 10–15)
HCT VFR BLD AUTO: 40.8 % (ref 35–47)
HGB BLD-MCNC: 13.6 G/DL (ref 11.7–15.7)
MCH RBC QN AUTO: 31.1 PG (ref 26.5–33)
MCHC RBC AUTO-ENTMCNC: 33.3 G/DL (ref 31.5–36.5)
MCV RBC AUTO: 93 FL (ref 78–100)
PLATELET # BLD AUTO: 338 10E9/L (ref 150–450)
RBC # BLD AUTO: 4.37 10E12/L (ref 3.8–5.2)
WBC # BLD AUTO: 8.8 10E9/L (ref 4–11)

## 2017-12-06 PROCEDURE — 80053 COMPREHEN METABOLIC PANEL: CPT | Performed by: INTERNAL MEDICINE

## 2017-12-06 PROCEDURE — 85027 COMPLETE CBC AUTOMATED: CPT | Mod: GZ | Performed by: INTERNAL MEDICINE

## 2017-12-06 PROCEDURE — 80061 LIPID PANEL: CPT | Performed by: INTERNAL MEDICINE

## 2017-12-06 PROCEDURE — 99397 PER PM REEVAL EST PAT 65+ YR: CPT | Performed by: INTERNAL MEDICINE

## 2017-12-06 PROCEDURE — 84443 ASSAY THYROID STIM HORMONE: CPT | Mod: GZ | Performed by: INTERNAL MEDICINE

## 2017-12-06 PROCEDURE — 36415 COLL VENOUS BLD VENIPUNCTURE: CPT | Performed by: INTERNAL MEDICINE

## 2017-12-06 RX ORDER — AMLODIPINE BESYLATE 5 MG/1
5 TABLET ORAL DAILY
Qty: 90 TABLET | Refills: 1 | Status: SHIPPED | OUTPATIENT
Start: 2017-12-06 | End: 2018-05-29

## 2017-12-06 RX ORDER — METOPROLOL TARTRATE 25 MG/1
12.5 TABLET, FILM COATED ORAL 2 TIMES DAILY
Qty: 30 TABLET | Refills: 0 | Status: SHIPPED | OUTPATIENT
Start: 2017-12-06 | End: 2018-01-09

## 2017-12-06 ASSESSMENT — PATIENT HEALTH QUESTIONNAIRE - PHQ9: SUM OF ALL RESPONSES TO PHQ QUESTIONS 1-9: 1

## 2017-12-06 NOTE — NURSING NOTE
"Chief Complaint   Patient presents with     Medicare Visit       Initial /80 (BP Location: Right arm, Patient Position: Sitting, Cuff Size: Adult Regular)  Pulse 68  Temp 97.9  F (36.6  C) (Oral)  Ht 5' 5.5\" (1.664 m)  Wt 149 lb 11.2 oz (67.9 kg)  SpO2 98%  Breastfeeding? No  BMI 24.53 kg/m2 Estimated body mass index is 24.53 kg/(m^2) as calculated from the following:    Height as of this encounter: 5' 5.5\" (1.664 m).    Weight as of this encounter: 149 lb 11.2 oz (67.9 kg).  Medication Reconciliation: complete   Sandi Stearns MA    "

## 2017-12-06 NOTE — PROGRESS NOTES
SUBJECTIVE:   Lisa Lopez is a 72 year old female who presents for Preventive Visit.      Are you in the first 12 months of your Medicare coverage?  No    Physical   Annual:     Getting at least 3 servings of Calcium per day::  Yes    Bi-annual eye exam::  Yes    Dental care twice a year::  Yes    Sleep apnea or symptoms of sleep apnea::  None    Diet::  Regular (no restrictions)    Frequency of exercise::  2-3 days/week    Duration of exercise::  30-45 minutes    Taking medications regularly::  Yes    Medication side effects::  None    Additional concerns today::  No      COGNITIVE SCREEN  1) Repeat 3 items (Banana, Sunrise, Chair)    2) Clock draw: NORMAL  3) 3 item recall: Recalls 3 objects  Results: 3 items recalled: COGNITIVE IMPAIRMENT LESS LIKELY    Mini-CogTM Copyright S Saurabh. Licensed by the author for use in [facility]; reprinted with permission ([email or web address]@[email or web address]). All rights reserved.          Reviewed and updated as needed this visit by clinical staffTobacco  Allergies  Meds  Med Hx  Surg Hx  Fam Hx  Soc Hx        Reviewed and updated as needed this visit by Provider        Social History   Substance Use Topics     Smoking status: Former Smoker     Start date: 4/15/1974     Smokeless tobacco: Never Used      Comment: Smoked [age] yo.     Alcohol use 0.0 oz/week     0 Standard drinks or equivalent per week       The patient does not drink >3 drinks per day nor >7 drinks per week.              Today's PHQ-2 Score: PHQ-2 ( 1999 Pfizer) 12/3/2017   Q1: Little interest or pleasure in doing things 0   Q2: Feeling down, depressed or hopeless 0   PHQ-2 Score 0   Q1: Little interest or pleasure in doing things Not at all   Q2: Feeling down, depressed or hopeless Not at all   PHQ-2 Score 0       Do you feel safe in your environment - Yes    Do you have a Health Care Directive?: Yes: Advance Directive has been received and scanned.    Current providers sharing in care for this patient  include:   Patient Care Team:  Soco Durham MD as PCP - General (Internal Medicine)      Hearing impairment: No    Ability to successfully perform activities of daily living: Yes, no assistance needed     Fall risk:  Fallen 2 or more times in the past year?: No  Any fall with injury in the past year?: No      Home safety:  none identified      The following health maintenance items are reviewed in Epic and correct as of today:Health Maintenance   Topic Date Due     PNEUMOCOCCAL (2 of 2 - PPSV23) 08/18/2016     PHQ-9 Q6 MONTHS  04/19/2017     INFLUENZA VACCINE (SYSTEM ASSIGNED)  09/01/2017     DEPRESSION ACTION PLAN Q1 YR  09/26/2017     FALL RISK ASSESSMENT  06/14/2018     MAMMO SCREEN Q2 YR (SYSTEM ASSIGNED)  09/19/2019     ADVANCE DIRECTIVE PLANNING Q5 YRS  12/30/2020     TETANUS IMMUNIZATION (SYSTEM ASSIGNED)  08/30/2021     LIPID SCREEN Q5 YR FEMALE (SYSTEM ASSIGNED)  11/09/2021     COLON CANCER SCREEN (SYSTEM ASSIGNED)  03/23/2027     DEXA SCAN SCREENING (SYSTEM ASSIGNED)  Completed     BP Readings from Last 3 Encounters:   12/06/17 154/80   06/14/17 140/80   03/23/17 152/68    Wt Readings from Last 3 Encounters:   12/06/17 149 lb 11.2 oz (67.9 kg)   06/14/17 (P) 145 lb (65.8 kg)   01/13/17 144 lb (65.3 kg)                  Patient Active Problem List   Diagnosis     Advance care planning     Paroxysmal supraventricular tachycardia (H)     Hypercholesteremia     Dysthymia     Essential hypertension, benign     Osteoporosis     Past Surgical History:   Procedure Laterality Date     COLONOSCOPY N/A 3/23/2017    Procedure: COMBINED COLONOSCOPY, SINGLE OR MULTIPLE BIOPSY/POLYPECTOMY BY BIOPSY;  Surgeon: Devante Sanchez MD;  Location: [facility]     HYSTERECTOMY, PAP NO LONGER INDICATED         Social History   Substance Use Topics     Smoking status: Former Smoker     Start date: 4/15/1974     Smokeless tobacco: Never Used      Comment: Smoked [age] yo.     Alcohol use 0.0 oz/week     0 Standard drinks  or equivalent per week     Family History   Problem Relation Age of Onset     DIABETES Father 60      66 yo     HEART DISEASE Mother       66 yo      HEART DISEASE Brother       75 yo     HEART DISEASE Brother       78 yo     HEART DISEASE Brother       78 yo      HEART DISEASE Brother 67     Triple bypass     Family History Negative Sister       62 yo MVA      Family History Negative Sister      Family History Negative Daughter      Family History Negative Daughter          Current Outpatient Prescriptions   Medication Sig Dispense Refill     metoprolol (LOPRESSOR) 25 MG tablet Take 0.5 tablets (12.5 mg) by mouth 2 times daily 30 tablet 0     amLODIPine (NORVASC) 5 MG tablet Take 1 tablet (5 mg) by mouth daily 90 tablet 1     ciprofloxacin (CIPRO) 500 MG tablet Take 1 tablet (500 mg) by mouth 2 times daily 14 tablet 0     cephALEXin (KEFLEX) 500 MG capsule Take 1 capsule (500 mg) by mouth 2 times daily 20 capsule 0     simvastatin (ZOCOR) 20 MG tablet Take 0.5 tablets (10 mg) by mouth At Bedtime 45 tablet 0     sulfamethoxazole-trimethoprim (BACTRIM DS/SEPTRA DS) 800-160 MG per tablet Take 1 tablet by mouth 2 times daily 14 tablet 0     LORazepam (ATIVAN) 0.5 MG tablet Take 0.5 tablets (0.25 mg) by mouth daily 45 tablet 1     multivitamin, therapeutic with minerals (MULTI-VITAMIN) TABS Take 1 tablet by mouth daily 100 tablet 3     fish oil-omega-3 fatty acids (OMEGA 3) 1000 MG capsule Take 1 g by mouth 2 times daily  90 capsule      Cholecalciferol (VITAMIN D) 2000 UNITS tablet Take 2,000 Units by mouth daily 100 tablet 3     Coenzyme Q10 (CO Q 10) 100 MG CAPS Take 1 capsule by mouth daily        aspirin 81 MG tablet Take 81 mg by mouth daily  30 tablet            Review of Systems  C: NEGATIVE for fever, chills, change in weight  I: NEGATIVE for worrisome rashes, moles or lesions  E: NEGATIVE for vision changes or irritation  E/M: NEGATIVE for ear, mouth and throat  "problems  R: NEGATIVE for significant cough or SOB  B: NEGATIVE for masses, tenderness or discharge  CV: NEGATIVE for chest pain, palpitations or peripheral edema  GI: NEGATIVE for nausea, abdominal pain, heartburn, or change in bowel habits  : NEGATIVE for frequency, dysuria, or hematuria  MUSCULOSKELETAL:ongoing knee pain  N: NEGATIVE for weakness, dizziness or paresthesias  E: NEGATIVE for temperature intolerance, skin/hair changes  H: NEGATIVE for bleeding problems  P: NEGATIVE for changes in mood or affect    OBJECTIVE:   There were no vitals taken for this visit. Estimated body mass index is 23.58 kg/(m^2) (pended) as calculated from the following:    Height as of 6/14/17: (P) 5' 5.75\" (1.67 m).    Weight as of 6/14/17: (P) 145 lb (65.8 kg).  Physical Exam  GENERAL: healthy, alert and no distress  EYES: Eyes grossly normal to inspection, PERRL and conjunctivae and sclerae normal  HENT: ear canals and TM's normal, nose and mouth without ulcers or lesions  NECK: no adenopathy, no asymmetry, masses, or scars and thyroid normal to palpation  RESP: lungs clear to auscultation - no rales, rhonchi or wheezes  BREAST: normal without masses, tenderness or nipple discharge and no palpable axillary masses or adenopathy  CV: regular rate and rhythm, normal S1 S2, no S3 or S4, no murmur, click or rub, no peripheral edema and peripheral pulses strong  ABDOMEN: soft, nontender, no hepatosplenomegaly, no masses and bowel sounds normal  MS: no gross musculoskeletal defects noted, no edema  SKIN: no suspicious lesions or rashes  NEURO: Normal strength and tone, mentation intact and speech normal  PSYCH: mentation appears normal, affect normal/bright    ASSESSMENT / PLAN:   1. Routine general medical examination at a health care facility     - Lipid panel reflex to direct LDL Non-fasting  - TSH with free T4 reflex  - Comprehensive metabolic panel (BMP + Alb, Alk Phos, ALT, AST, Total. Bili, TP)  - CBC with platelets    2. " "Paroxysmal supraventricular tachycardia (H)     - metoprolol (LOPRESSOR) 25 MG tablet; Take 0.5 tablets (12.5 mg) by mouth 2 times daily  Dispense: 30 tablet; Refill: 0    3. Essential hypertension, benign     - metoprolol (LOPRESSOR) 25 MG tablet; Take 0.5 tablets (12.5 mg) by mouth 2 times daily  Dispense: 30 tablet; Refill: 0  - amLODIPine (NORVASC) 5 MG tablet; Take 1 tablet (5 mg) by mouth daily  Dispense: 90 tablet; Refill: 1    4. Left knee pain, unspecified chronicity     - [facility], [facility] REFERRAL    End of Life Planning:  Patient currently has an advanced directive: No.  I have verified the patient's ablity to prepare an advanced directive/make health care decisions.  Literature was provided to assist patient in preparing an advanced directive.    COUNSELING:  Reviewed preventive health counseling, as reflected in patient instructions       Regular exercise       Healthy diet/nutrition        Estimated body mass index is 23.58 kg/(m^2) (pended) as calculated from the following:    Height as of 6/14/17: (P) 5' 5.75\" (1.67 m).    Weight as of 6/14/17: (P) 145 lb (65.8 kg).     reports that she has quit smoking. She started smoking about 43 years ago. She has never used smokeless tobacco.        Appropriate preventive services were discussed with this patient, including applicable screening as appropriate for cardiovascular disease, diabetes, osteopenia/osteoporosis, and glaucoma.  As appropriate for age/gender, discussed screening for colorectal cancer, prostate cancer, breast cancer, and cervical cancer. Checklist reviewing preventive services available has been given to the patient.    Reviewed patients plan of care and provided an AVS. The Basic Care Plan (routine screening as documented in Health Maintenance) for Lisa meets the Care Plan requirement. This Care Plan has been established and reviewed with the Patient.    Counseling Resources:  ATP IV Guidelines  Pooled Cohorts Equation " Calculator  Breast Cancer Risk Calculator  FRAX Risk Assessment  ICSI Preventive Guidelines  Dietary Guidelines for Americans, 2010  [employer] MyPlate  ASA Prophylaxis  Lung CA Screening    Soco Durham MD  [facility]

## 2017-12-06 NOTE — MR AVS SNAPSHOT
After Visit Summary   12/6/2017    Lisa Lopez    MRN: [id number]           Patient Information     Date Of Birth          1945        Visit Information        Provider Department      12/6/2017 3:20 PM Soco Durham MD [facility]        Today's Diagnoses     Essential hypertension, benign    -  1    Paroxysmal supraventricular tachycardia (H)        Left knee pain, unspecified chronicity          Care Instructions      Preventive Health Recommendations    Female Ages 65 +    Yearly exam:     See your health care provider every year in order to  o Review health changes.   o Discuss preventive care.    o Review your medicines if your doctor has prescribed any.      You no longer need a yearly Pap test unless you've had an abnormal Pap test in the past 10 years. If you have vaginal symptoms, such as bleeding or discharge, be sure to talk with your provider about a Pap test.      Every 1 to 2 years, have a mammogram.  If you are over 69, talk with your health care provider about whether or not you want to continue having screening mammograms.      Every 10 years, have a colonoscopy. Or, have a yearly FIT test (stool test). These exams will check for colon cancer.       Have a cholesterol test every 5 years, or more often if your doctor advises it.       Have a diabetes test (fasting glucose) every three years. If you are at risk for diabetes, you should have this test more often.       At age 65, have a bone density scan (DEXA) to check for osteoporosis (brittle bone disease).    Shots:    Get a flu shot each year.    Get a tetanus shot every 10 years.    Talk to your doctor about your pneumonia vaccines. There are now two you should receive - Pneumovax (PPSV 23) and Prevnar (PCV 13).    Talk to your doctor about the shingles vaccine.    Talk to your doctor about the hepatitis B vaccine.    Nutrition:     Eat at least 5 servings of fruits and vegetables each  day.      Eat whole-grain bread, whole-wheat pasta and brown rice instead of white grains and rice.      Talk to your provider about Calcium and Vitamin D.     Lifestyle    Exercise at least 150 minutes a week (30 minutes a day, 5 days a week). This will help you control your weight and prevent disease.      Limit alcohol to one drink per day.      No smoking.       Wear sunscreen to prevent skin cancer.       See your dentist twice a year for an exam and cleaning.      See your eye doctor every 1 to 2 years to screen for conditions such as glaucoma, macular degeneration and cataracts.          Follow-ups after your visit        Additional Services     [facility] PT, HAND, AND CHIROPRACTIC REFERRAL       **This order will print in the [facility] Scheduling Office**    Physical Therapy, Hand Therapy and Chiropractic Care are available through:    *[facility]  *[facility]  *[facility]    Call one number to schedule at any of the above locations: [phone].    Your provider has referred you to: Physical Therapy at [facility] or [facility]    Indication/Reason for Referral: Knee Pain  Onset of Illness: months  Therapy Orders: Evaluate and Treat  Special Programs: None  Special Request: None    Shikha Trivedi      Additional Comments for the Therapist or Chiropractor:      Please be aware that coverage of these services is subject to the terms and limitations of your health insurance plan.  Call member services at your health plan with any benefit or coverage questions.      Please bring the following to your appointment:    *Your personal calendar for scheduling future appointments  *Comfortable clothing                  Your next 10 appointments already scheduled     Jan 19, 2018  9:15 AM CST   New Visit with Laurence Mcintyre DO   [facility] ([facility])    [address] [address] [address] [phone]  "             Who to contact     If you have questions or need follow up information about today's clinic visit or your schedule please contact [facility] directly at [phone].  Normal or non-critical lab and imaging results will be communicated to you by [employer], letter or phone within 4 business days after the clinic has received the results. If you do not hear from us within 7 days, please contact the clinic through [employer] or phone. If you have a critical or abnormal lab result, we will notify you by phone as soon as possible.  Submit refill requests through [employer] or call your pharmacy and they will forward the refill request to us. Please allow 3 business days for your refill to be completed.          Additional Information About Your Visit        [employer] Information     [employer] gives you secure access to your electronic health record. If you see a primary care provider, you can also send messages to your care team and make appointments. If you have questions, please call your primary care clinic.  If you do not have a primary care provider, please call [phone] and they will assist you.        Care EveryWhere ID     This is your Care EveryWhere ID. This could be used by other organizations to access your [place] medical records  [id number]        Your Vitals Were     Pulse Temperature Height Pulse Oximetry Breastfeeding? BMI (Body Mass Index)    68 97.9  F (36.6  C) (Oral) 5' 5.5\" (1.664 m) 98% No 24.53 kg/m2       Blood Pressure from Last 3 Encounters:   12/06/17 154/80   06/14/17 140/80   03/23/17 152/68    Weight from Last 3 Encounters:   12/06/17 149 lb 11.2 oz (67.9 kg)   06/14/17 (P) 145 lb (65.8 kg)   01/13/17 144 lb (65.3 kg)              We Performed the Following     [facility] PT, HAND, AND CHIROPRACTIC REFERRAL          Today's Medication Changes          These changes are accurate as of: 12/6/17  4:06 PM.  If you have any questions, ask your nurse or doctor.             "   Start taking these medicines.        Dose/Directions    amLODIPine 5 MG tablet   Commonly known as:  NORVASC   Used for:  Essential hypertension, benign   Started by:  Soco Durham MD        Dose:  5 mg   Take 1 tablet (5 mg) by mouth daily   Quantity:  90 tablet   Refills:  1            Where to get your medicines      These medications were sent to [facility] [address] [place] [address] [address] AT [place] [address]  [id number] [place] [address]    Hours:  24-hours Phone:  [phone]     amLODIPine 5 MG tablet    metoprolol 25 MG tablet                Primary Care Provider Office Phone # Fax #    Soco Durham [phone]       [address] [facility] [address]  [facility] [address]        Equal Access to Services     ALEJANDRA [facility]E : Hadii pilar duarte hadasho Soomaali, waaxda luqadaha, qaybta kaalmada adeegyada, waxay noahin haypeterson hartley . So [facility] [phone].    ATENCIÓN: Si habla español, tiene a lee disposición servicios gratuitos de asistencia lingüística. Llame al [phone].    We comply with applicable federal civil rights laws and [place] laws. We do not discriminate on the basis of race, color, national origin, age, disability, sex, sexual orientation, or gender identity.            Thank you!     Thank you for choosing [facility]  for your care. Our goal is always to provide you with excellent care. Hearing back from our patients is one way we can continue to improve our services. Please take a few minutes to complete the written survey that you may receive in the mail after your visit with us. Thank you!             Your Updated Medication List - Protect others around you: Learn how to safely use, store and throw away your medicines at www.disposemymeds.org.          This list is accurate as of: 12/6/17  4:06 PM.  Always use your most recent med list.                   Brand Name Dispense Instructions for  use Diagnosis    amLODIPine 5 MG tablet    NORVASC    90 tablet    Take 1 tablet (5 mg) by mouth daily    Essential hypertension, benign       aspirin 81 MG tablet     30 tablet    Take 81 mg by mouth daily        cephALEXin 500 MG capsule    KEFLEX    20 capsule    Take 1 capsule (500 mg) by mouth 2 times daily    Dysuria       ciprofloxacin 500 MG tablet    CIPRO    14 tablet    Take 1 tablet (500 mg) by mouth 2 times daily    Dysuria       Co Q 10 100 MG Caps      Take 1 capsule by mouth daily        fish oil-omega-3 fatty acids 1000 MG capsule     90 capsule    Take 1 g by mouth 2 times daily        LORazepam 0.5 MG tablet    ATIVAN    45 tablet    Take 0.5 tablets (0.25 mg) by mouth daily    Dysthymia       metoprolol 25 MG tablet    LOPRESSOR    30 tablet    Take 0.5 tablets (12.5 mg) by mouth 2 times daily    Paroxysmal supraventricular tachycardia (H), Essential hypertension, benign       Multi-vitamin Tabs tablet     100 tablet    Take 1 tablet by mouth daily        simvastatin 20 MG tablet    ZOCOR    45 tablet    Take 0.5 tablets (10 mg) by mouth At Bedtime    Hypercholesteremia       sulfamethoxazole-trimethoprim 800-160 MG per tablet    BACTRIM DS/SEPTRA DS    14 tablet    Take 1 tablet by mouth 2 times daily    Dysuria       vitamin D 2000 UNITS tablet     100 tablet    Take 2,000 Units by mouth daily

## 2017-12-07 LAB
ALBUMIN SERPL-MCNC: 4 G/DL (ref 3.4–5)
ALP SERPL-CCNC: 60 U/L (ref 40–150)
ALT SERPL W P-5'-P-CCNC: 41 U/L (ref 0–50)
ANION GAP SERPL CALCULATED.3IONS-SCNC: 9 MMOL/L (ref 3–14)
AST SERPL W P-5'-P-CCNC: 30 U/L (ref 0–45)
BILIRUB SERPL-MCNC: 0.3 MG/DL (ref 0.2–1.3)
BUN SERPL-MCNC: 20 MG/DL (ref 7–30)
CALCIUM SERPL-MCNC: 9.5 MG/DL (ref 8.5–10.1)
CHLORIDE SERPL-SCNC: 107 MMOL/L (ref 94–109)
CHOLEST SERPL-MCNC: 217 MG/DL
CO2 SERPL-SCNC: 27 MMOL/L (ref 20–32)
CREAT SERPL-MCNC: 0.75 MG/DL (ref 0.52–1.04)
GFR SERPL CREATININE-BSD FRML MDRD: 76 ML/MIN/1.7M2
GLUCOSE SERPL-MCNC: 74 MG/DL (ref 70–99)
HDLC SERPL-MCNC: 60 MG/DL
LDLC SERPL CALC-MCNC: 110 MG/DL
NONHDLC SERPL-MCNC: 157 MG/DL
POTASSIUM SERPL-SCNC: 4.7 MMOL/L (ref 3.4–5.3)
PROT SERPL-MCNC: 7.9 G/DL (ref 6.8–8.8)
SODIUM SERPL-SCNC: 143 MMOL/L (ref 133–144)
TRIGL SERPL-MCNC: 234 MG/DL
TSH SERPL DL<=0.005 MIU/L-ACNC: 3.42 MU/L (ref 0.4–4)

## 2017-12-12 ENCOUNTER — THERAPY VISIT (OUTPATIENT)
Dept: PHYSICAL THERAPY | Facility: CLINIC | Age: 72
End: 2017-12-12
Payer: COMMERCIAL

## 2017-12-12 DIAGNOSIS — M25.562 CHRONIC PAIN OF LEFT KNEE: Primary | ICD-10-CM

## 2017-12-12 DIAGNOSIS — G89.29 CHRONIC PAIN OF LEFT KNEE: Primary | ICD-10-CM

## 2017-12-12 PROCEDURE — 97530 THERAPEUTIC ACTIVITIES: CPT | Mod: GP | Performed by: PHYSICAL THERAPIST

## 2017-12-12 PROCEDURE — 97161 PT EVAL LOW COMPLEX 20 MIN: CPT | Mod: GP | Performed by: PHYSICAL THERAPIST

## 2017-12-12 PROCEDURE — 97110 THERAPEUTIC EXERCISES: CPT | Mod: GP | Performed by: PHYSICAL THERAPIST

## 2017-12-12 ASSESSMENT — ACTIVITIES OF DAILY LIVING (ADL)
WALK: ACTIVITY IS NOT DIFFICULT
KNEE_ACTIVITY_OF_DAILY_LIVING_SCORE: 87.14
GO DOWN STAIRS: ACTIVITY IS MINIMALLY DIFFICULT
GO UP STAIRS: ACTIVITY IS MINIMALLY DIFFICULT
GIVING WAY, BUCKLING OR SHIFTING OF KNEE: I HAVE THE SYMPTOM BUT IT DOES NOT AFFECT MY ACTIVITY
RAW_SCORE: 61
KNEE_ACTIVITY_OF_DAILY_LIVING_SUM: 61
SQUAT: ACTIVITY IS NOT DIFFICULT
STIFFNESS: THE SYMPTOM AFFECTS MY ACTIVITY SLIGHTLY
LIMPING: I HAVE THE SYMPTOM BUT IT DOES NOT AFFECT MY ACTIVITY
HOW_WOULD_YOU_RATE_THE_CURRENT_FUNCTION_OF_YOUR_KNEE_DURING_YOUR_USUAL_DAILY_ACTIVITIES_ON_A_SCALE_FROM_0_TO_100_WITH_100_BEING_YOUR_LEVEL_OF_KNEE_FUNCTION_PRIOR_TO_YOUR_INJURY_AND_0_BEING_THE_INABILITY_TO_PERFORM_ANY_OF_YOUR_USUAL_DAILY_ACTIVITIES?: 90
WEAKNESS: I HAVE THE SYMPTOM BUT IT DOES NOT AFFECT MY ACTIVITY
STAND: ACTIVITY IS NOT DIFFICULT
RISE FROM A CHAIR: ACTIVITY IS NOT DIFFICULT
PAIN: THE SYMPTOM AFFECTS MY ACTIVITY SLIGHTLY
SIT WITH YOUR KNEE BENT: ACTIVITY IS NOT DIFFICULT
HOW_WOULD_YOU_RATE_THE_OVERALL_FUNCTION_OF_YOUR_KNEE_DURING_YOUR_USUAL_DAILY_ACTIVITIES?: NEARLY NORMAL
AS_A_RESULT_OF_YOUR_KNEE_INJURY,_HOW_WOULD_YOU_RATE_YOUR_CURRENT_LEVEL_OF_DAILY_ACTIVITY?: NORMAL
KNEEL ON THE FRONT OF YOUR KNEE: ACTIVITY IS NOT DIFFICULT
SWELLING: I DO NOT HAVE THE SYMPTOM

## 2017-12-12 NOTE — MR AVS SNAPSHOT
After Visit Summary   12/12/2017    Lisa Lopez    MRN: [id number]           Patient Information     Date Of Birth          1945        Visit Information        Provider Department      12/12/2017 2:20 PM Deepak Calzada PT [facility]        Today's Diagnoses     Chronic pain of left knee    -  1       Follow-ups after your visit        Your next 10 appointments already scheduled     Dec 27, 2017 10:20 AM CST   [facility] Extremity with Deepak Calzada PT   [facility] ([facility] [place])    [address]  [facility] [id number] [phone]            Jan 19, 2018  9:15 AM CST   New Visit with Laurence Mcintyre DO   [facility] ([facility])    [address] [facility] [address]  [facility] [id number] [phone]              Who to contact     If you have questions or need follow up information about today's clinic visit or your schedule please contact [facility] directly at [phone].  Normal or non-critical lab and imaging results will be communicated to you by MyChart, letter or phone within 4 business days after the clinic has received the results. If you do not hear from us within 7 days, please contact the clinic through [employer] or phone. If you have a critical or abnormal lab result, we will notify you by phone as soon as possible.  Submit refill requests through [employer] or call your pharmacy and they will forward the refill request to us. Please allow 3 business days for your refill to be completed.          Additional Information About Your Visit        MyChart Information     [employer] gives you secure access to your electronic health record. If you see a primary care provider, you can also send messages to your care team and make appointments. If you  have questions, please call your primary care clinic.  If you do not have a primary care provider, please call [phone] and they will assist you.        Care EveryWhere ID     This is your Care EveryWhere ID. This could be used by other organizations to access your [place] medical records  [id number]         Blood Pressure from Last 3 Encounters:   12/06/17 154/80   06/14/17 140/80   03/23/17 152/68    Weight from Last 3 Encounters:   12/06/17 67.9 kg (149 lb 11.2 oz)   06/14/17 (P) 65.8 kg (145 lb)   01/13/17 65.3 kg (144 lb)              We Performed the Following     HC PT EVAL, LOW COMPLEXITY     [facility] INITIAL EVAL REPORT     THERAPEUTIC ACTIVITIES     THERAPEUTIC EXERCISES        Primary Care Provider Office Phone # Fax #    Soco Durham [phone]       [address] [facility] [address]  [facility] [address]        Equal Access to Services     KAMRYN HAYNES : Hadii aad ku hadasho Soomaali, waaxda luqadaha, qaybta kaalmada adeegyada, waxay idiin hayaan jackieeg kharash naeem . So [facility] [phone].    ATENCIÓN: Si ras louis, tiene a lee disposición servicios gratuitos de asistencia lingüística. Maxame al [phone].    We comply with applicable federal civil rights laws and [place] laws. We do not discriminate on the basis of race, color, national origin, age, disability, sex, sexual orientation, or gender identity.            Thank you!     Thank you for choosing [facility]  for your care. Our goal is always to provide you with excellent care. Hearing back from our patients is one way we can continue to improve our services. Please take a few minutes to complete the written survey that you may receive in the mail after your visit with us. Thank you!             Your Updated Medication List - Protect others around you: Learn how to safely use, store and throw away your medicines at www.disposemymeds.org.          This list is  accurate as of: 12/12/17  3:12 PM.  Always use your most recent med list.                   Brand Name Dispense Instructions for use Diagnosis    amLODIPine 5 MG tablet    NORVASC    90 tablet    Take 1 tablet (5 mg) by mouth daily    Essential hypertension, benign       aspirin 81 MG tablet     30 tablet    Take 81 mg by mouth daily        cephALEXin 500 MG capsule    KEFLEX    20 capsule    Take 1 capsule (500 mg) by mouth 2 times daily    Dysuria       ciprofloxacin 500 MG tablet    CIPRO    14 tablet    Take 1 tablet (500 mg) by mouth 2 times daily    Dysuria       Co Q 10 100 MG Caps      Take 1 capsule by mouth daily        fish oil-omega-3 fatty acids 1000 MG capsule     90 capsule    Take 1 g by mouth 2 times daily        LORazepam 0.5 MG tablet    ATIVAN    45 tablet    Take 0.5 tablets (0.25 mg) by mouth daily    Dysthymia       metoprolol 25 MG tablet    LOPRESSOR    30 tablet    Take 0.5 tablets (12.5 mg) by mouth 2 times daily    Paroxysmal supraventricular tachycardia (H), Essential hypertension, benign       Multi-vitamin Tabs tablet     100 tablet    Take 1 tablet by mouth daily        simvastatin 20 MG tablet    ZOCOR    45 tablet    Take 0.5 tablets (10 mg) by mouth At Bedtime    Hypercholesteremia       sulfamethoxazole-trimethoprim 800-160 MG per tablet    BACTRIM DS/SEPTRA DS    14 tablet    Take 1 tablet by mouth 2 times daily    Dysuria       vitamin D 2000 UNITS tablet     100 tablet    Take 2,000 Units by mouth daily

## 2017-12-12 NOTE — PROGRESS NOTES
Subjective:    Patient is a 72 year old female presenting with rehab left knee hpi. The history is provided by the patient. No  was used.   Lisa Lopez is a 72 year old female with a left knee condition.      This is a chronic condition  Pt c/o L knee pain x 1 year.  States pain started after helping someone move (bending, lifting and stairs).  MD visit date 12/6/17..    Patient reports pain:  Anterior.    Pain is described as stabbing and aching and is intermittent and reported as 1/10 and 5/10.   Pain is the same all the time.  Symptoms are exacerbated by bending/squatting, ascending stairs and descending stairs and relieved by rest.  Since onset symptoms are unchanged.        General health as reported by patient is excellent.  Pertinent medical history includes:  High blood pressure.  Medical allergies: no.  Other surgeries include:  Other (hysterectomy).  Current medications:  High blood pressure medication.  Current occupation is Retired  .                                    Objective:      Gait:    Gait Type:  Normal                                                           Knee Evaluation:  ROM:  AROM: normal  PROM: normal  Strength wnl knee: glute med R 4+/5, L 4-/5.          Strength:     Extension:  Left: 4+/5    Pain:+/-      Right:/5  Strong/pain free  Pain:  Flexion:  Left:/5  Strong/pain free  Pain:      Right:/5  Strong/pain free  Pain:    Quad Set Left:  Good    Pain: +/-   Quad Set Right: WNL    Pain:  Ligament Testing:  Normal                Special Tests:     Left knee negative for the following special tests:  Meninscal and Patellar compression    Palpation:  Normal      Edema:  Normal      Functional Testing:        Core Strength:    Single Leg Bridge: Left: /20 reps   Right:/20 reps    % of Uninvolved:  15 dbl  Quad:    Single Leg Squat:  Left:       Moderate loss of control  Right:       Mild loss of control  Bilateral Leg Squat:   Mild loss of control       Proprioception:   Stork Balance Test:  Left:  20-30sec fair control  Right:  30sec  % of Uninvolved:           General     ROS    Assessment/Plan:      Patient is a 72 year old female with left side knee complaints.    Patient has the following significant findings with corresponding treatment plan.                Diagnosis 1:  L knee pain  Pain -  hot/cold therapy and home program  Decreased strength - therapeutic exercise and therapeutic activities  Decreased proprioception - neuro re-education and therapeutic activities  Impaired muscle performance - neuro re-education  Decreased function - therapeutic activities    Therapy Evaluation Codes:   1) History comprised of:   Personal factors that impact the plan of care:      None.    Comorbidity factors that impact the plan of care are:      High blood pressure.     Medications impacting care: High blood pressure.  2) Examination of Body Systems comprised of:   Body structures and functions that impact the plan of care:      Knee.   Activity limitations that impact the plan of care are:      Squatting/kneeling and Stairs.  3) Clinical presentation characteristics are:   Stable/Uncomplicated.  4) Decision-Making    Low complexity using standardized patient assessment instrument and/or measureable assessment of functional outcome.  Cumulative Therapy Evaluation is: Low complexity.    Previous and current functional limitations:  (See Goal Flow Sheet for this information)    Short term and Long term goals: (See Goal Flow Sheet for this information)     Communication ability:  Patient appears to be able to clearly communicate and understand verbal and written communication and follow directions correctly.  Treatment Explanation - The following has been discussed with the patient:   RX ordered/plan of care  Anticipated outcomes  Possible risks and side effects  This patient would benefit from PT intervention to resume normal activities.   Rehab potential is  excellent.    Frequency:  1 X week, once daily  Duration:  for 8 weeks  Discharge Plan:  Achieve all LTG.  Independent in home treatment program.  Reach maximal therapeutic benefit.    Please refer to the daily flowsheet for treatment today, total treatment time and time spent performing 1:1 timed codes.

## 2018-01-05 ENCOUNTER — THERAPY VISIT (OUTPATIENT)
Dept: PHYSICAL THERAPY | Facility: CLINIC | Age: 73
End: 2018-01-05
Payer: COMMERCIAL

## 2018-01-05 DIAGNOSIS — M25.562 CHRONIC PAIN OF LEFT KNEE: ICD-10-CM

## 2018-01-05 DIAGNOSIS — G89.29 CHRONIC PAIN OF LEFT KNEE: ICD-10-CM

## 2018-01-05 PROCEDURE — 97112 NEUROMUSCULAR REEDUCATION: CPT | Mod: GP | Performed by: PHYSICAL THERAPIST

## 2018-01-05 PROCEDURE — 97110 THERAPEUTIC EXERCISES: CPT | Mod: GP | Performed by: PHYSICAL THERAPIST

## 2018-01-05 PROCEDURE — 97530 THERAPEUTIC ACTIVITIES: CPT | Mod: GP | Performed by: PHYSICAL THERAPIST

## 2018-01-05 NOTE — MR AVS SNAPSHOT
After Visit Summary   1/5/2018    Lisa Lopez    MRN: [id number]           Patient Information     Date Of Birth          1945        Visit Information        Provider Department      1/5/2018 9:40 AM Deepak Calzada PT [place] [facility]        Today's Diagnoses     Chronic pain of left knee           Follow-ups after your visit        Your next 10 appointments already scheduled     Jan 19, 2018  9:15 AM CST   New Visit with Laurence Mcintyre DO   [facility] ([facility])    [address]  [facility] [address] [phone]              Who to contact     If you have questions or need follow up information about today's clinic visit or your schedule please contact [facility] directly at [phone].  Normal or non-critical lab and imaging results will be communicated to you by MyChart, letter or phone within 4 business days after the clinic has received the results. If you do not hear from us within 7 days, please contact the clinic through [employer] or phone. If you have a critical or abnormal lab result, we will notify you by phone as soon as possible.  Submit refill requests through [employer] or call your pharmacy and they will forward the refill request to us. Please allow 3 business days for your refill to be completed.          Additional Information About Your Visit        MyChart Information     [employer] gives you secure access to your electronic health record. If you see a primary care provider, you can also send messages to your care team and make appointments. If you have questions, please call your primary care clinic.  If you do not have a primary care provider, please call [phone] and they will assist you.        Care EveryWhere ID     This is your Care EveryWhere ID. This could be used by other  organizations to access your [place] medical records  [id number]         Blood Pressure from Last 3 Encounters:   12/06/17 154/80   06/14/17 140/80   03/23/17 152/68    Weight from Last 3 Encounters:   12/06/17 67.9 kg (149 lb 11.2 oz)   06/14/17 (P) 65.8 kg (145 lb)   01/13/17 65.3 kg (144 lb)              We Performed the Following     NEUROMUSCULAR RE-EDUCATION     THERAPEUTIC ACTIVITIES     THERAPEUTIC EXERCISES        Primary Care Provider Office Phone # Fax #    Soco Durham [phone]       [address] [facility] [address]  [facility] [address]        Equal Access to Services     ALEJANDRA HAYNES : Hadii aad ku hadasho Sojuan, waaxda luqadaha, qaybta kaalmada adeegyada, dina de la rosa. So [facility] [phone].    ATENCIÓN: Si habla español, tiene a lee disposición servicios gratuitos de asistencia lingüística. Llame al [phone].    We comply with applicable federal civil rights laws and [place] laws. We do not discriminate on the basis of race, color, national origin, age, disability, sex, sexual orientation, or gender identity.            Thank you!     Thank you for choosing [facility]  for your care. Our goal is always to provide you with excellent care. Hearing back from our patients is one way we can continue to improve our services. Please take a few minutes to complete the written survey that you may receive in the mail after your visit with us. Thank you!             Your Updated Medication List - Protect others around you: Learn how to safely use, store and throw away your medicines at www.disposemymeds.org.          This list is accurate as of: 1/5/18 11:26 AM.  Always use your most recent med list.                   Brand Name Dispense Instructions for use Diagnosis    amLODIPine 5 MG tablet    NORVASC    90 tablet    Take 1 tablet (5 mg) by mouth daily    Essential hypertension, benign        aspirin 81 MG tablet     30 tablet    Take 81 mg by mouth daily        cephALEXin 500 MG capsule    KEFLEX    20 capsule    Take 1 capsule (500 mg) by mouth 2 times daily    Dysuria       ciprofloxacin 500 MG tablet    CIPRO    14 tablet    Take 1 tablet (500 mg) by mouth 2 times daily    Dysuria       Co Q 10 100 MG Caps      Take 1 capsule by mouth daily        fish oil-omega-3 fatty acids 1000 MG capsule     90 capsule    Take 1 g by mouth 2 times daily        LORazepam 0.5 MG tablet    ATIVAN    45 tablet    Take 0.5 tablets (0.25 mg) by mouth daily    Dysthymia       metoprolol 25 MG tablet    LOPRESSOR    30 tablet    Take 0.5 tablets (12.5 mg) by mouth 2 times daily    Paroxysmal supraventricular tachycardia (H), Essential hypertension, benign       Multi-vitamin Tabs tablet     100 tablet    Take 1 tablet by mouth daily        simvastatin 20 MG tablet    ZOCOR    45 tablet    Take 0.5 tablets (10 mg) by mouth At Bedtime    Hypercholesteremia       sulfamethoxazole-trimethoprim 800-160 MG per tablet    BACTRIM DS/SEPTRA DS    14 tablet    Take 1 tablet by mouth 2 times daily    Dysuria       vitamin D 2000 UNITS tablet     100 tablet    Take 2,000 Units by mouth daily

## 2018-01-09 DIAGNOSIS — I47.10 PAROXYSMAL SUPRAVENTRICULAR TACHYCARDIA (H): ICD-10-CM

## 2018-01-09 DIAGNOSIS — I10 ESSENTIAL HYPERTENSION, BENIGN: ICD-10-CM

## 2018-01-12 RX ORDER — METOPROLOL TARTRATE 25 MG/1
12.5 TABLET, FILM COATED ORAL 2 TIMES DAILY
Qty: 30 TABLET | Refills: 5 | Status: SHIPPED | OUTPATIENT
Start: 2018-01-12 | End: 2018-07-05

## 2018-01-12 NOTE — TELEPHONE ENCOUNTER
"Requested Prescriptions   Pending Prescriptions Disp Refills     metoprolol tartrate (LOPRESSOR) 25 MG tablet 30 tablet 0     Sig: Take 0.5 tablets (12.5 mg) by mouth 2 times daily    Beta-Blockers Protocol Failed    1/9/2018  9:46 AM       Failed - Blood pressure under 140/90    BP Readings from Last 3 Encounters:   12/06/17 154/80   06/14/17 140/80   03/23/17 152/68                Passed - Patient is age 6 or older       Passed - Recent or future visit with authorizing provider's specialty    Patient had office visit in the last year or has a visit in the next 30 days with authorizing provider.  See \"Patient Info\" tab in inbasket, or \"Choose Columns\" in Meds & Orders section of the refill encounter.               Prescription approved per [facility] Refill Protocol.    "

## 2018-01-16 ENCOUNTER — PRE VISIT (OUTPATIENT)
Dept: CARDIOLOGY | Facility: CLINIC | Age: 73
End: 2018-01-16

## 2018-01-19 ENCOUNTER — OFFICE VISIT (OUTPATIENT)
Dept: CARDIOLOGY | Facility: CLINIC | Age: 73
End: 2018-01-19
Payer: COMMERCIAL

## 2018-01-19 VITALS
HEIGHT: 66 IN | HEART RATE: 60 BPM | WEIGHT: 151.3 LBS | DIASTOLIC BLOOD PRESSURE: 66 MMHG | BODY MASS INDEX: 24.32 KG/M2 | SYSTOLIC BLOOD PRESSURE: 128 MMHG

## 2018-01-19 DIAGNOSIS — I47.10 PAROXYSMAL SUPRAVENTRICULAR TACHYCARDIA (H): ICD-10-CM

## 2018-01-19 DIAGNOSIS — Z82.49 FAMILY HISTORY OF ISCHEMIC HEART DISEASE: ICD-10-CM

## 2018-01-19 DIAGNOSIS — I10 BENIGN ESSENTIAL HYPERTENSION: Primary | ICD-10-CM

## 2018-01-19 PROCEDURE — 99213 OFFICE O/P EST LOW 20 MIN: CPT | Performed by: INTERNAL MEDICINE

## 2018-01-19 RX ORDER — PHENOL 1.4 %
500 AEROSOL, SPRAY (ML) MUCOUS MEMBRANE DAILY
COMMUNITY
End: 2021-03-11

## 2018-01-19 NOTE — PROGRESS NOTES
HPI and Plan:   See dictation    Orders Placed This Encounter   Procedures     CT Coronary Calcium Scan       Orders Placed This Encounter   Medications     Probiotic Product (PROBIOTIC PO)     Sig: Take by mouth daily     Resveratrol 250 MG CAPS     Sig: Take 500 mg by mouth daily       Medications Discontinued During This Encounter   Medication Reason     cephALEXin (KEFLEX) 500 MG capsule Therapy completed     ciprofloxacin (CIPRO) 500 MG tablet Therapy completed     sulfamethoxazole-trimethoprim (BACTRIM DS/SEPTRA DS) 800-160 MG per tablet Therapy completed         Encounter Diagnoses   Name Primary?     Paroxysmal supraventricular tachycardia      Benign essential hypertension Yes     Family history of ischemic heart disease        CURRENT MEDICATIONS:  Current Outpatient Prescriptions   Medication Sig Dispense Refill     Probiotic Product (PROBIOTIC PO) Take by mouth daily       Resveratrol 250 MG CAPS Take 500 mg by mouth daily       metoprolol tartrate (LOPRESSOR) 25 MG tablet Take 0.5 tablets (12.5 mg) by mouth 2 times daily 30 tablet 5     amLODIPine (NORVASC) 5 MG tablet Take 1 tablet (5 mg) by mouth daily 90 tablet 1     simvastatin (ZOCOR) 20 MG tablet Take 0.5 tablets (10 mg) by mouth At Bedtime 45 tablet 0     LORazepam (ATIVAN) 0.5 MG tablet Take 0.5 tablets (0.25 mg) by mouth daily 45 tablet 1     multivitamin, therapeutic with minerals (MULTI-VITAMIN) TABS Take 1 tablet by mouth daily 100 tablet 3     fish oil-omega-3 fatty acids (OMEGA 3) 1000 MG capsule Take 1 g by mouth 2 times daily  90 capsule      Cholecalciferol (VITAMIN D) 2000 UNITS tablet Take 2,000 Units by mouth daily 100 tablet 3     Coenzyme Q10 (CO Q 10) 100 MG CAPS Take 1 capsule by mouth daily        aspirin 81 MG tablet Take 81 mg by mouth daily  30 tablet        ALLERGIES   No Known Allergies    PAST MEDICAL HISTORY:  Past Medical History:   Diagnosis Date     Cough 4/15/2014     Dysthymia 4/15/2014     Essential hypertension,  "benign 4/15/2014     Hypercholesteremia 4/15/2014     Paroxysmal supraventricular tachycardia (H) 4/15/2014       PAST SURGICAL HISTORY:  Past Surgical History:   Procedure Laterality Date     COLONOSCOPY N/A 3/23/2017    Procedure: COMBINED COLONOSCOPY, SINGLE OR MULTIPLE BIOPSY/POLYPECTOMY BY BIOPSY;  Surgeon: Devante Sanchez MD;  Location: [facility]     HYSTERECTOMY, PAP NO LONGER INDICATED         FAMILY HISTORY:  Family History   Problem Relation Age of Onset     DIABETES Father 60      68 yo     HEART DISEASE Mother       68 yo      HEART DISEASE Brother       77 yo     HEART DISEASE Brother       78 yo     HEART DISEASE Brother       78 yo      HEART DISEASE Brother 67     Triple bypass     Family History Negative Sister       60 yo MVA      Family History Negative Sister      Family History Negative Daughter      Family History Negative Daughter        SOCIAL HISTORY:  Social History     Social History     Marital status:      Spouse name: N/A     Number of children: N/A     Years of education: N/A     Occupational History      Retired     Social History Main Topics     Smoking status: Former Smoker     Start date: 4/15/1974     Smokeless tobacco: Never Used      Comment: Smoked 22-27 yo.     Alcohol use 0.0 oz/week     0 Standard drinks or equivalent per week      Comment: minimal - 2 glasses of wine monthly     Drug use: No     Sexual activity: Yes     Partners: Male     Other Topics Concern     Caffeine Concern No     not caffeine      Special Diet No     regular diet      Exercise Yes     3 times a week at gym      Social History Narrative       Review of Systems:  Skin:  Negative       Eyes:  Positive for glasses    ENT:  Positive for hearing loss \"minimal\"  Respiratory:  Negative       Cardiovascular:    Positive for;fatigue since starting amlodipine per pt  Gastroenterology: Negative      Genitourinary:  Negative      Musculoskeletal:  " "Negative      Neurologic:  Negative      Psychiatric:  Negative      Heme/Lymph/Imm:  Negative      Endocrine:  Negative        Physical Exam:  Vitals: /66 (BP Location: Right arm, Patient Position: Chair, Cuff Size: Adult Regular)  Pulse 60  Ht 1.664 m (5' 5.5\")  Wt 68.6 kg (151 lb 4.8 oz)  BMI 24.79 kg/m2    Constitutional:  cooperative, alert and oriented, well developed, well nourished, in no acute distress        Skin:  warm and dry to the touch          Head:  normocephalic        Eyes:  pupils equal and round        Lymph:      ENT:  no pallor or cyanosis        Neck:  carotid pulses are full and equal bilaterally        Respiratory:  clear to auscultation;normal symmetry         Cardiac: regular rhythm;no murmurs, gallops or rubs detected                pulses full and equal                                        GI:  abdomen soft;no bruits        Extremities and Muscular Skeletal:  no deformities, clubbing, cyanosis, erythema observed              Neurological:  no gross motor deficits        Psych:  Alert and Oriented x 3          CC  Soco Durham MD  [address]  [place] [address]                  "

## 2018-01-19 NOTE — LETTER
2018      Soco Durham MD  [address] [facility] [address]  [facility] [address]      RE: Lisa Lopez       Dear Colleague,    I had the pleasure of seeing Lisa Lopez in the [facility].    REFERRING PHYSICIAN:  Dr. oSco Durham      HISTORY OF PRESENT ILLNESS:  Ms. Lopez is a very pleasant 72-year-old female who has had a history of SVT back in .  She has been on low dose beta blocker for this.  She has seen Dr. Aviles in the past.  She has requested followup with me.  She is feeling well over the past year.  She has not had any recurrence of SVT or palpitations.  She is quite concerned, however, about her risks of developing heart disease.  She states she is the youngest of 7 and several members of her immediate family have  from heart disease.  Again, she does not have any active symptoms right now.  She does feel a little bit more tired.  She was just started on amlodipine for elevated blood pressures and wonders if it may be related to that medication.  She did have some minor swelling of the lower extremities and some flushing sensation initially with medication but that has seemingly resolved.  She has not been active and exercising like she has in the past but she did just join the [employer] and so she is looking forward to getting back into a regular exercise routine.  She also is a little upset that she is continuing to gain weight gradually over a period of time and wonders if it may be medication induced.      PHYSICAL EXAMINATION:   VITAL SIGNS:  Today, her blood pressure is 128/66, pulse is 60, weight 151 and a body mass index of 24.  She is up about 7 pounds from last year.   NECK:  Carotid upstrokes seem brisk without bruit.   CARDIOVASCULAR:  Tones are regular without murmur, gallop or rub.   LUNGS:  Clear posteriorly.   EXTREMITIES:  She has strong and symmetric pulses in the upper and lower extremities without peripheral edema.       LABORATORIES:  I did review her cholesterol profile from December showing a total cholesterol of 217, HDL 60,  and triglycerides were 234.  She is on low dose simvastatin for hyperlipidemia.  She had a basic metabolic panel including kidney function that looked normal, CBC that looked normal and thyroid studies and liver function tests were normal as well.      SUMMARY:  Ms. Dillard is a very pleasant 72-year-old female with history of SVT, recently placed on high blood pressure medication and hyperlipidemia treated with low dose simvastatin.  She is asymptomatic from a cardiac perspective but she has concerns about her prognosis as far as future cardiovascular disease.  I have suggested that she undergo a CT calcium scoring for additional evaluation and guidance as to what her future risks are.  I did inform her that she appears to be well treated for her risk factors.  I would encourage her to get back into a regular exercise routine and she may want to incorporate some strength training or resistance training to help build muscle mass, increase her metabolism for weight loss.  I did explain the CT calcium scoring to her and she is interested in this, so I will go ahead and order this for her and we will contact her with the results.  Schedule a followup visit if needed for any further questions or to review the test.  She did not need any medication refills today.  I am happy to see her back as needed.  She has followed Dr. Aviles on an annual basis.  I am certainly happy to do this as well.  Please feel free to contact me with any questions you have in regards to her care.      cc:   Soco Durham MD    [facility]    [address]   [place] [address]         JULIUS YOON DO             D: 2018 09:44   T: 2018 11:09   MT: RENETTA      Name:     AYALA DILLARD   MRN:      [id number]        Account:      [id number]   :      1945           Service  Date: 01/19/2018      Document: [id number]         Outpatient Encounter Prescriptions as of 1/19/2018   Medication Sig Dispense Refill     Probiotic Product (PROBIOTIC PO) Take by mouth daily       Resveratrol 250 MG CAPS Take 500 mg by mouth daily       metoprolol tartrate (LOPRESSOR) 25 MG tablet Take 0.5 tablets (12.5 mg) by mouth 2 times daily 30 tablet 5     amLODIPine (NORVASC) 5 MG tablet Take 1 tablet (5 mg) by mouth daily 90 tablet 1     [DISCONTINUED] simvastatin (ZOCOR) 20 MG tablet Take 0.5 tablets (10 mg) by mouth At Bedtime 45 tablet 0     LORazepam (ATIVAN) 0.5 MG tablet Take 0.5 tablets (0.25 mg) by mouth daily 45 tablet 1     multivitamin, therapeutic with minerals (MULTI-VITAMIN) TABS Take 1 tablet by mouth daily 100 tablet 3     fish oil-omega-3 fatty acids (OMEGA 3) 1000 MG capsule Take 1 g by mouth 2 times daily  90 capsule      Cholecalciferol (VITAMIN D) 2000 UNITS tablet Take 2,000 Units by mouth daily 100 tablet 3     Coenzyme Q10 (CO Q 10) 100 MG CAPS Take 1 capsule by mouth daily        aspirin 81 MG tablet Take 81 mg by mouth daily  30 tablet      [DISCONTINUED] ciprofloxacin (CIPRO) 500 MG tablet Take 1 tablet (500 mg) by mouth 2 times daily 14 tablet 0     [DISCONTINUED] cephALEXin (KEFLEX) 500 MG capsule Take 1 capsule (500 mg) by mouth 2 times daily 20 capsule 0     [DISCONTINUED] sulfamethoxazole-trimethoprim (BACTRIM DS/SEPTRA DS) 800-160 MG per tablet Take 1 tablet by mouth 2 times daily 14 tablet 0     No facility-administered encounter medications on file as of 1/19/2018.        Again, thank you for allowing me to participate in the care of your patient.      Sincerely,    Laurence Mcintyre, DO     [facility]

## 2018-01-19 NOTE — MR AVS SNAPSHOT
After Visit Summary   1/19/2018    Lisa Lopez    MRN: [id number]           Patient Information     Date Of Birth          1945        Visit Information        Provider Department      1/19/2018 9:15 AM Laurence Mcintyre DO [facility]        Today's Diagnoses     Benign essential hypertension    -  1    Paroxysmal supraventricular tachycardia        Family history of ischemic heart disease           Follow-ups after your visit        Your next 10 appointments already scheduled     Jan 25, 2018  2:00 PM CST   CT CALCIUM SCREENING with SCICT1   [facility] (Cardiovascular Imaging at [facility])    [address] [place] [address]  [address]  [facility] [id number] [phone]           It is best to avoid caffeine on the day of your test.  Be sure to tell your doctor:   If there s any chance you are pregnant.   If you have any special needs.  Please wear loose clothing, such as a sweat suit or jogging clothes. Avoid snaps, zippers and other metal. We may ask you to undress and put on a hospital gown.  If you have any questions, please call the Imaging Department where you will have your exam.              Future tests that were ordered for you today     Open Future Orders        Priority Expected Expires Ordered    CT Coronary Calcium Scan Routine 1/20/2018 1/19/2019 1/19/2018            Who to contact     If you have questions or need follow up information about today's clinic visit or your schedule please contact [facility] directly at [phone].  Normal or non-critical lab and imaging results will be communicated to you by MyChart, letter or phone within 4 business days after the clinic has received the results. If you do not hear from us within 7 days, please contact the clinic through MyChart or phone. If you have a critical or abnormal lab result, we will notify  "you by phone as soon as possible.  Submit refill requests through [employer] or call your pharmacy and they will forward the refill request to us. Please allow 3 business days for your refill to be completed.          Additional Information About Your Visit        [employer] Information     [employer] gives you secure access to your electronic health record. If you see a primary care provider, you can also send messages to your care team and make appointments. If you have questions, please call your primary care clinic.  If you do not have a primary care provider, please call [phone] and they will assist you.        Care EveryWhere ID     This is your Care EveryWhere ID. This could be used by other organizations to access your [place] medical records  [id number]        Your Vitals Were     Pulse Height BMI (Body Mass Index)             60 1.664 m (5' 5.5\") 24.79 kg/m2          Blood Pressure from Last 3 Encounters:   01/19/18 128/66   12/06/17 154/80   06/14/17 140/80    Weight from Last 3 Encounters:   01/19/18 68.6 kg (151 lb 4.8 oz)   12/06/17 67.9 kg (149 lb 11.2 oz)   06/14/17 (P) 65.8 kg (145 lb)              We Performed the Following     Follow-Up with Cardiologist        Primary Care Provider Office Phone # Fax #    Soco Durham [phone]       [address]  [facility] [address]        Equal Access to Services     [facility]: Hadii aad ku hadasho Soomaali, waaxda luqadaha, qaybta kaalmada adeegyada, dina zamorain haypeterson ernst lajeramien ah. So [facility] [phone].    ATENCIÓN: Si habla español, tiene a lee disposición servicios gratuitos de asistencia lingüística. Llame al [phone].    We comply with applicable federal civil rights laws and [place] laws. We do not discriminate on the basis of race, color, national origin, age, disability, sex, sexual orientation, or gender identity.            Thank you!     Thank you for choosing [facility] HE[facility] CARE   [place]  for your care. Our goal is always to provide you with excellent care. Hearing back from our patients is one way we can continue to improve our services. Please take a few minutes to complete the written survey that you may receive in the mail after your visit with us. Thank you!             Your Updated Medication List - Protect others around you: Learn how to safely use, store and throw away your medicines at www.disposemymeds.org.          This list is accurate as of: 1/19/18  9:42 AM.  Always use your most recent med list.                   Brand Name Dispense Instructions for use Diagnosis    amLODIPine 5 MG tablet    NORVASC    90 tablet    Take 1 tablet (5 mg) by mouth daily    Essential hypertension, benign       aspirin 81 MG tablet     30 tablet    Take 81 mg by mouth daily        Co Q 10 100 MG Caps      Take 1 capsule by mouth daily        fish oil-omega-3 fatty acids 1000 MG capsule     90 capsule    Take 1 g by mouth 2 times daily        LORazepam 0.5 MG tablet    ATIVAN    45 tablet    Take 0.5 tablets (0.25 mg) by mouth daily    Dysthymia       metoprolol tartrate 25 MG tablet    LOPRESSOR    30 tablet    Take 0.5 tablets (12.5 mg) by mouth 2 times daily    Paroxysmal supraventricular tachycardia (H), Essential hypertension, benign       Multi-vitamin Tabs tablet     100 tablet    Take 1 tablet by mouth daily        PROBIOTIC PO      Take by mouth daily        Resveratrol 250 MG Caps      Take 500 mg by mouth daily        simvastatin 20 MG tablet    ZOCOR    45 tablet    Take 0.5 tablets (10 mg) by mouth At Bedtime    Hypercholesteremia       vitamin D 2000 UNITS tablet     100 tablet    Take 2,000 Units by mouth daily

## 2018-01-19 NOTE — PROGRESS NOTES
REFERRING PHYSICIAN:  Dr. Soco Durham      HISTORY OF PRESENT ILLNESS:  Ms. Lopez is a very pleasant 72-year-old female who has had a history of SVT back in .  She has been on low dose beta blocker for this.  She has seen Dr. Aviles in the past.  She has requested followup with me.  She is feeling well over the past year.  She has not had any recurrence of SVT or palpitations.  She is quite concerned, however, about her risks of developing heart disease.  She states she is the youngest of 7 and several members of her immediate family have  from heart disease.  Again, she does not have any active symptoms right now.  She does feel a little bit more tired.  She was just started on amlodipine for elevated blood pressures and wonders if it may be related to that medication.  She did have some minor swelling of the lower extremities and some flushing sensation initially with medication but that has seemingly resolved.  She has not been active and exercising like she has in the past but she did just join the [employer] and so she is looking forward to getting back into a regular exercise routine.  She also is a little upset that she is continuing to gain weight gradually over a period of time and wonders if it may be medication induced.      PHYSICAL EXAMINATION:   VITAL SIGNS:  Today, her blood pressure is 128/66, pulse is 60, weight 151 and a body mass index of 24.  She is up about 7 pounds from last year.   NECK:  Carotid upstrokes seem brisk without bruit.   CARDIOVASCULAR:  Tones are regular without murmur, gallop or rub.   LUNGS:  Clear posteriorly.   EXTREMITIES:  She has strong and symmetric pulses in the upper and lower extremities without peripheral edema.      LABORATORIES:  I did review her cholesterol profile from December showing a total cholesterol of 217, HDL 60,  and triglycerides were 234.  She is on low dose simvastatin for hyperlipidemia.  She had a basic metabolic panel including  kidney function that looked normal, CBC that looked normal and thyroid studies and liver function tests were normal as well.      SUMMARY:  Ms. Dillard is a very pleasant 72-year-old female with history of SVT, recently placed on high blood pressure medication and hyperlipidemia treated with low dose simvastatin.  She is asymptomatic from a cardiac perspective but she has concerns about her prognosis as far as future cardiovascular disease.  I have suggested that she undergo a CT calcium scoring for additional evaluation and guidance as to what her future risks are.  I did inform her that she appears to be well treated for her risk factors.  I would encourage her to get back into a regular exercise routine and she may want to incorporate some strength training or resistance training to help build muscle mass, increase her metabolism for weight loss.  I did explain the CT calcium scoring to her and she is interested in this, so I will go ahead and order this for her and we will contact her with the results.  Schedule a followup visit if needed for any further questions or to review the test.  She did not need any medication refills today.  I am happy to see her back as needed.  She has followed Dr. Aviles on an annual basis.  I am certainly happy to do this as well.  Please feel free to contact me with any questions you have in regards to her care.      cc:   Soco Durham MD    [facility]    [address], [address]   [place] [id number]         JULIUS YOON DO             D: 2018 09:44   T: 2018 11:09   MT: RENETTA      Name:     AYALA DILLARD   MRN:      [id number]        Account:      [id number]   :      1945           Service Date: 2018      Document: [id number]

## 2018-01-30 DIAGNOSIS — E78.00 HYPERCHOLESTEREMIA: ICD-10-CM

## 2018-02-01 NOTE — PROGRESS NOTES
"Discharge Note    Progress reporting period is from initial eval to Jan 5, 2018.     Lisa failed to return for next follow up visit and current status is unknown.  Please see information below for last relevant information on current status.  Patient seen for Rxs Used: 2 visits.  SUBJECTIVE  Subjective changes noted by patient:  Subjective: Doing little better, feeling stronger  .  Current pain level is Current Pain level: 2/10.     Previous pain level was  Initial Pain level: 2/10.   Changes in function:  Yes (See Goal flowsheet attached for changes in current functional level)  Adverse reaction to treatment or activity: None    OBJECTIVE  Changes noted in objective findings: Objective: Fair control 3\" step down,      ASSESSMENT/PLAN  Diagnosis: L knee pain   DIAGP:  The encounter diagnosis was Chronic pain of left knee.  Updated problem list and treatment plan:   Pain - HEP  Decreased ROM/flexibility - HEP  Decreased function - HEP  Decreased strength - HEP  Impaired muscle performance - HEP  STG/LTGs have been met or progress has been made towards goals:  Yes, please see goal flowsheet for most current information  Assessment of Progress: current status is unknown.  Last current status: Assessment of progress: Pt is progressing as expected   Self Management Plans:  HEP  I have re-evaluated this patient and find that the nature, scope, duration and intensity of the therapy is appropriate for the medical condition of the patient.  Lisa continues to require the following intervention to meet STG and LTG's:  HEP.    Recommendations:  Discharge with current home program.  Patient to follow up with MD as needed.    Please refer to the daily flowsheet for treatment today, total treatment time and time spent performing 1:1 timed codes.      "

## 2018-02-02 RX ORDER — SIMVASTATIN 20 MG
10 TABLET ORAL AT BEDTIME
Qty: 45 TABLET | Refills: 3 | Status: SHIPPED | OUTPATIENT
Start: 2018-02-02 | End: 2019-01-21

## 2018-02-02 NOTE — TELEPHONE ENCOUNTER
"Requested Prescriptions   Pending Prescriptions Disp Refills     simvastatin (ZOCOR) 20 MG tablet 45 tablet 0     Sig: Take 0.5 tablets (10 mg) by mouth At Bedtime    Statins Protocol Passed    1/30/2018  1:18 PM       Passed - LDL on file in past 12 months    Recent Labs   Lab Test  12/06/17   1611   LDL  110*            Passed - No abnormal creatine kinase in past 12 months    No lab results found.         Passed - Recent or future visit with authorizing provider    Patient had office visit in the last year or has a visit in the next 30 days with authorizing provider.  See \"Patient Info\" tab in inbasket, or \"Choose Columns\" in Meds & Orders section of the refill encounter.            Passed - Patient is age 18 or older       Passed - No active pregnancy on record       Passed - No positive pregnancy test in past 12 months        Prescription approved per [facility] Refill Protocol.        "

## 2018-02-26 ENCOUNTER — MYC REFILL (OUTPATIENT)
Dept: INTERNAL MEDICINE | Facility: CLINIC | Age: 73
End: 2018-02-26

## 2018-02-26 DIAGNOSIS — F34.1 DYSTHYMIA: ICD-10-CM

## 2018-03-01 ENCOUNTER — MYC MEDICAL ADVICE (OUTPATIENT)
Dept: INTERNAL MEDICINE | Facility: CLINIC | Age: 73
End: 2018-03-01

## 2018-03-01 RX ORDER — LORAZEPAM 0.5 MG/1
0.25 TABLET ORAL DAILY
Qty: 45 TABLET | Refills: 1 | Status: SHIPPED | OUTPATIENT
Start: 2018-03-01 | End: 2018-09-05

## 2018-03-01 NOTE — TELEPHONE ENCOUNTER
ativan     Last Written Prescription Date:  9/5/17  Last Fill Quantity: 45,   # refills: 1  Last Office Visit: 6/14/17  Future Office visit:       Routing refill request to provider for review/approval because:  Drug not on the [facility], P or [facility] refill protocol or controlled substance

## 2018-03-01 NOTE — TELEPHONE ENCOUNTER
Message from MyChart:  Original authorizing provider: MD Lisa Henderson would like a refill of the following medications:  LORazepam (ATIVAN) 0.5 MG tablet [Jolly Fitzpatrick MD]    Preferred pharmacy: [facility] [address] [place] [address]    Comment:

## 2018-03-08 ENCOUNTER — HOSPITAL ENCOUNTER (OUTPATIENT)
Dept: CT IMAGING | Facility: CLINIC | Age: 73
Discharge: HOME | End: 2018-03-08
Attending: INTERNAL MEDICINE | Admitting: INTERNAL MEDICINE
Payer: MEDICARE

## 2018-03-08 ENCOUNTER — TELEPHONE (OUTPATIENT)
Dept: CARDIOLOGY | Facility: CLINIC | Age: 73
End: 2018-03-08

## 2018-03-08 ENCOUNTER — TELEPHONE (OUTPATIENT)
Dept: INTERNAL MEDICINE | Facility: CLINIC | Age: 73
End: 2018-03-08

## 2018-03-08 DIAGNOSIS — I10 BENIGN ESSENTIAL HYPERTENSION: ICD-10-CM

## 2018-03-08 DIAGNOSIS — I47.10 PAROXYSMAL SUPRAVENTRICULAR TACHYCARDIA (H): ICD-10-CM

## 2018-03-08 DIAGNOSIS — R91.8 PULMONARY NODULES: Primary | ICD-10-CM

## 2018-03-08 DIAGNOSIS — Z82.49 FAMILY HISTORY OF ISCHEMIC HEART DISEASE: ICD-10-CM

## 2018-03-08 LAB — RADIOLOGIST FLAGS: ABNORMAL

## 2018-03-08 PROCEDURE — 75571 CT HRT W/O DYE W/CA TEST: CPT | Mod: GA

## 2018-03-08 PROCEDURE — 75571 CT HRT W/O DYE W/CA TEST: CPT | Mod: 26 | Performed by: INTERNAL MEDICINE

## 2018-03-08 NOTE — TELEPHONE ENCOUNTER
Received overhead page about an urgent result from [facility]. Pt had CT calcium score done today and the radiologist consult noted:     IMPRESSION:   1. Tree-in-bud opacities in the anterior right upper lobe, likely  infectious. Recommend follow-up to ensure resolution.  2. 8 mm nodular density in the right lung base. Recommend follow-up as  below.     Reviewed results with Dr. Mcintyre, the ordering cardiologist, and she would like the pt's PMD to be notified.     Called Dr. Durham's office and spoke to one of the nurses and explained that the results should be addressed with Dr. Durham or one of their colleagues since Dr. Durham is out of the office until May. Nurse gave verbal understanding and will review the results with one of the providers.

## 2018-03-08 NOTE — TELEPHONE ENCOUNTER
[facility] calling--pt had a CT done today that showed the following:    IMPRESSION:   1. Tree-in-bud opacities in the anterior right upper lobe, likely  infectious. Recommend follow-up to ensure resolution.  2. 8 mm nodular density in the right lung base. Recommend follow-up as  below.     [[facility] Center: 8 mm nodular density in the right lung base and  tree-in-bud opacities in the anteroinferior right upper lobe.    They would like PCP to follow up on this.  Please advise.  Landy Alicia RN

## 2018-03-09 ENCOUNTER — TELEPHONE (OUTPATIENT)
Dept: CARDIOLOGY | Facility: CLINIC | Age: 73
End: 2018-03-09

## 2018-03-09 DIAGNOSIS — I47.10 PAROXYSMAL SUPRAVENTRICULAR TACHYCARDIA (H): Primary | ICD-10-CM

## 2018-03-09 NOTE — TELEPHONE ENCOUNTER
Spoke with pt about CT calcium score results. Explained to pt that her score was 68.68 and this was reviewed with Dr. Mcintyre and she said that this was low risk. Pt gave verbal understanding and would like to follow up with Dr. Mcintyre on an annual basis.

## 2018-03-12 ENCOUNTER — MYC MEDICAL ADVICE (OUTPATIENT)
Dept: INTERNAL MEDICINE | Facility: CLINIC | Age: 73
End: 2018-03-12

## 2018-03-13 NOTE — TELEPHONE ENCOUNTER
Pt sent [employer] msg today about this, wants to talk to someone to explain in more detail.  Should we sched her appt or order further testing?

## 2018-03-13 NOTE — TELEPHONE ENCOUNTER
"Spoke with patient on phone--provided reassurance that such findings are often noted incidentally on CT coronary calcium scans.   She is just getting over a \"bronchitis\"--cough has resolved, no fevers or shortness of breath.     Advised her that we should schedule a repeat chest CT scan in about three months. Ordered.   "

## 2018-05-29 DIAGNOSIS — I10 ESSENTIAL HYPERTENSION, BENIGN: ICD-10-CM

## 2018-06-01 RX ORDER — AMLODIPINE BESYLATE 5 MG/1
5 TABLET ORAL DAILY
Qty: 90 TABLET | Refills: 0 | Status: SHIPPED | OUTPATIENT
Start: 2018-06-01 | End: 2018-09-05

## 2018-06-01 NOTE — TELEPHONE ENCOUNTER
"        Requested Prescriptions   Pending Prescriptions Disp Refills     amLODIPine (NORVASC) 5 MG tablet 90 tablet 1     Sig: Take 1 tablet (5 mg) by mouth daily    Calcium Channel Blockers Protocol  Passed    5/29/2018 12:49 PM       Passed - Blood pressure under 140/90 in past 12 months    BP Readings from Last 3 Encounters:   01/19/18 128/66   12/06/17 154/80   06/14/17 140/80                Passed - Recent (12 mo) or future (30 days) visit within the authorizing provider's specialty    Patient had office visit in the last 12 months or has a visit in the next 30 days with authorizing provider or within the authorizing provider's specialty.  See \"Patient Info\" tab in inbasket, or \"Choose Columns\" in Meds & Orders section of the refill encounter.           Passed - Patient is age 18 or older       Passed - No active pregnancy on record       Passed - Normal serum creatinine on file in past 12 months    Recent Labs   Lab Test  12/06/17   1611   CR  0.75            Passed - No positive pregnancy test in past 12 months          "

## 2018-06-04 ENCOUNTER — HOSPITAL ENCOUNTER (OUTPATIENT)
Dept: CT IMAGING | Facility: CLINIC | Age: 73
Discharge: HOME | End: 2018-06-04
Attending: INTERNAL MEDICINE | Admitting: INTERNAL MEDICINE
Payer: MEDICARE

## 2018-06-04 DIAGNOSIS — R91.8 PULMONARY NODULES: ICD-10-CM

## 2018-06-04 PROCEDURE — 71250 CT THORAX DX C-: CPT

## 2018-06-08 ENCOUNTER — MYC MEDICAL ADVICE (OUTPATIENT)
Dept: INTERNAL MEDICINE | Facility: CLINIC | Age: 73
End: 2018-06-08

## 2018-06-08 DIAGNOSIS — R91.1 PULMONARY NODULE: Primary | ICD-10-CM

## 2018-07-05 DIAGNOSIS — I47.10 PAROXYSMAL SUPRAVENTRICULAR TACHYCARDIA (H): ICD-10-CM

## 2018-07-05 DIAGNOSIS — I10 ESSENTIAL HYPERTENSION, BENIGN: ICD-10-CM

## 2018-07-05 NOTE — TELEPHONE ENCOUNTER
Requested Prescriptions   Pending Prescriptions Disp Refills     metoprolol tartrate (LOPRESSOR) 25 MG tablet  Last Written Prescription Date:  01/12/18  Last Fill Quantity: 30,  # refills: 5   Last office visit: 12/6/2017 with prescribing provider:  12/06/17 Marva   Future Office Visit:     30 tablet 5     Sig: Take 0.5 tablets (12.5 mg) by mouth 2 times daily    There is no refill protocol information for this order

## 2018-07-09 RX ORDER — METOPROLOL TARTRATE 25 MG/1
12.5 TABLET, FILM COATED ORAL 2 TIMES DAILY
Qty: 30 TABLET | Refills: 4 | Status: SHIPPED | OUTPATIENT
Start: 2018-07-09 | End: 2018-11-15

## 2018-07-17 ENCOUNTER — TELEPHONE (OUTPATIENT)
Dept: INTERNAL MEDICINE | Facility: CLINIC | Age: 73
End: 2018-07-17

## 2018-07-17 DIAGNOSIS — F34.1 DYSTHYMIA: Primary | ICD-10-CM

## 2018-07-17 NOTE — LETTER
[facility]  [address], [address]  [place], [place]  [address]                                            TEL:[phone]  FAX:[phone]      Lisa Lopez  [address] [facility] [address]      July 17, 2018    Dear Lisa,          At [facility], we care about your health and well-being. A review of your chart has indicated that you are due for some follow up questions on depression- see enclosed. Please contact us at [phone] to review these results.     If you have already had one or all of the above screening tests at another facility, please call us to update your chart.      Sincerely,      Soco Durham M.D.

## 2018-07-17 NOTE — TELEPHONE ENCOUNTER
Panel Management Review      Patient has the following on her problem list:     Depression / Dysthymia review    Measure:  Needs PHQ-9 score of 4 or less during index window.  Administer PHQ-9 and if score is 5 or more, send encounter to provider for next steps.    5   7 month window range:     PHQ-9 SCORE 12/28/2015 10/19/2016 12/6/2017   Total Score - - -   Total Score 0 0 1       If PHQ-9 recheck is 5 or more, route to provider for next steps.    Patient is due for:  PHQ9 and DAP      IVD   ASA: Passed    Last LDL:    Lab Results   Component Value Date    CHOL 217 12/06/2017     Lab Results   Component Value Date    HDL 60 12/06/2017     Lab Results   Component Value Date     12/06/2017     Lab Results   Component Value Date    TRIG 234 12/06/2017        Lab Results   Component Value Date    CHOLHDLRATIO 3.4 08/18/2015        Is the patient on a Statin? YES   Is the patient on Aspirin? YES                  Medications     HMG CoA Reductase Inhibitors    simvastatin (ZOCOR) 20 MG tablet    Salicylates    aspirin 81 MG tablet          Last three blood pressure readings:  BP Readings from Last 3 Encounters:   01/19/18 128/66   12/06/17 154/80   06/14/17 140/80        Tobacco History:     History   Smoking Status     Former Smoker     Start date: 4/15/1974   Smokeless Tobacco     Never Used     Comment: Smoked [age] yo.       Hypertension   Last three blood pressure readings:  BP Readings from Last 3 Encounters:   01/19/18 128/66   12/06/17 154/80   06/14/17 140/80     Blood pressure: Passed    HTN Guidelines:  Age 18-59 BP range:  Less than 140/90  Age 60-85 with Diabetes:  Less than 140/90  Age 60-85 without Diabetes:  less than 150/90      Composite cancer screening  Chart review shows that this patient is due/due soon for the following None  Summary:    Patient is due/failing the following:   DAP and PHQ9    Action needed:   Patient needs to do PHQ9.    Type of outreach:    Sent letter.    Questions for  provider review:      Needs depression action plan at next visit as provider on JACKY.                                                                                                                              MITCHELL Sorenson LPN       Chart routed to none.

## 2018-08-01 NOTE — TELEPHONE ENCOUNTER
Patient states she had situational depression about 5 years ago when she lost 3 brothers close together. She states she has had no depression for years already and requests depression be taken off her problem list? PHQ9 today is 0.

## 2018-08-02 ASSESSMENT — PATIENT HEALTH QUESTIONNAIRE - PHQ9: SUM OF ALL RESPONSES TO PHQ QUESTIONS 1-9: 0

## 2018-08-27 DIAGNOSIS — I10 ESSENTIAL HYPERTENSION, BENIGN: ICD-10-CM

## 2018-08-27 RX ORDER — AMLODIPINE BESYLATE 5 MG/1
5 TABLET ORAL DAILY
Qty: 90 TABLET | Refills: 0 | Status: CANCELLED | OUTPATIENT
Start: 2018-08-27

## 2018-08-27 NOTE — TELEPHONE ENCOUNTER
"Requested Prescriptions   Pending Prescriptions Disp Refills     amLODIPine (NORVASC) 5 MG tablet  Last Written Prescription Date:  6/1/18  Last Fill Quantity: 90,  # refills: 0   Last office visit: 12/6/2017 with prescribing provider:  Marva   Future Office Visit:     90 tablet 0     Sig: Take 1 tablet (5 mg) by mouth daily    Calcium Channel Blockers Protocol  Passed    8/27/2018  3:51 PM       Passed - Blood pressure under 140/90 in past 12 months    BP Readings from Last 3 Encounters:   01/19/18 128/66   12/06/17 154/80   06/14/17 140/80                Passed - Recent (12 mo) or future (30 days) visit within the authorizing provider's specialty    Patient had office visit in the last 12 months or has a visit in the next 30 days with authorizing provider or within the authorizing provider's specialty.  See \"Patient Info\" tab in inbasket, or \"Choose Columns\" in Meds & Orders section of the refill encounter.           Passed - Patient is age 18 or older       Passed - No active pregnancy on record       Passed - Normal serum creatinine on file in past 12 months    Recent Labs   Lab Test  12/06/17   1611   CR  0.75            Passed - No positive pregnancy test in past 12 months          "

## 2018-09-04 ENCOUNTER — MYC MEDICAL ADVICE (OUTPATIENT)
Dept: INTERNAL MEDICINE | Facility: CLINIC | Age: 73
End: 2018-09-04

## 2018-09-04 NOTE — TELEPHONE ENCOUNTER
Patient sent [employer] message reporting leg swelling, concerned this is being caused by Amlodipine. She asks if there is an alternative BP medication she can take for Amlodipine. Current dose is 5 mg daily.    If a new prescription is approved-will need to deny 8/27/18 refill request sent from the pharmacy for Amlodipine.     This request is forwarded to Dr Victoria who covers Dr Durham patients with the letters G,K,L.

## 2018-09-04 NOTE — TELEPHONE ENCOUNTER
Patient sent [employer] message today stating she has been having swollen ankles and requesting a different medication. See 9/4/18 Mychart encounter for response regarding request to change medication.

## 2018-09-05 NOTE — TELEPHONE ENCOUNTER
Sent patient a [employer] message, suggested remaining on amlodipine if possible, and that she may send us a [employer] message in reply if she wants to make a switch.

## 2018-10-22 ENCOUNTER — MYC MEDICAL ADVICE (OUTPATIENT)
Dept: INTERNAL MEDICINE | Facility: CLINIC | Age: 73
End: 2018-10-22

## 2018-10-22 DIAGNOSIS — H91.93 BILATERAL HEARING LOSS, UNSPECIFIED HEARING LOSS TYPE: Primary | ICD-10-CM

## 2018-10-24 ENCOUNTER — MYC MEDICAL ADVICE (OUTPATIENT)
Dept: INTERNAL MEDICINE | Facility: CLINIC | Age: 73
End: 2018-10-24

## 2018-10-29 ENCOUNTER — OFFICE VISIT (OUTPATIENT)
Dept: AUDIOLOGY | Facility: CLINIC | Age: 73
End: 2018-10-29
Payer: COMMERCIAL

## 2018-10-29 DIAGNOSIS — H90.3 SENSORY HEARING LOSS, BILATERAL: Primary | ICD-10-CM

## 2018-10-29 NOTE — MR AVS SNAPSHOT
After Visit Summary   10/29/2018    Lisa Lopez    MRN: [id number]           Patient Information     Date Of Birth          1945        Visit Information        Provider Department      10/29/2018 11:00 AM Rosario Lynn AuD M [facility] Audiology        Today's Diagnoses     Sensory hearing loss, bilateral    -  1       Follow-ups after your visit        Your next 10 appointments already scheduled     Nov 01, 2018 10:40 AM CDT   MA SCREENING BILATERAL W/ DIANNA with RHBCMA3   [facility] [place] ([facility])    [address] [address], [address]  [facility] [id number] [phone]           How do I prepare for my exam? (Food and drink instructions) No Food and Drink Restrictions.  How do I prepare for my exam? (Other instructions) Do not use any powder, lotion or deodorant under your arms or on your breast. If you do, we will ask you to remove it before your exam.  What should I wear: Wear comfortable, two-piece clothing.  How long does the exam take: Most scans will take 15 minutes.  What should I bring: Bring any previous mammograms from other facilities or have them mailed to the breast center.  Do I need a :  No  is needed.  What do I need to tell my doctor: If you have any allergies, tell your care team.  What should I do after the exam: No restrictions, You may resume normal activities.  What is this test: This test is an x-ray of the breast to look for breast disease. The breast is pressed between two plates to flatten and spread the tissue. An X-ray is taken of the breast from different angles.  Who should I call with questions: If you have any questions, please call the Imaging Department where you will have your exam. Directions, parking instructions, and other information is available on our website, [email or web address].  Other information about my exam Three-dimensional (3D) mammograms are available at [facility] locations in [facility]  "[place], [place], [place], [facility], [place], and [place]. [facility] locations include [place] and the [facility] in [place].  Benefits of 3D mammograms include: * Improved rate of cancer detection * Decreases your chance of having to go back for more tests, which means fewer: * \"False-positive\" results (This means that there is an abnormal area but it isn't cancer.) * Invasive testing procedures, such as a biopsy or surgery * Can provide clearer images of the breast if you have dense breast tissue.  *3D mammography is an optional exam that anyone can have with a 2D mammogram. It doesn't replace or take the place of a 2D mammogram. 2D mammograms remain an effective screening test for all women.  Not all insurance companies cover the cost of a 3D mammogram. Check with your insurance.            Dec 06, 2018 10:30 AM CST   CT CHEST W/O CONTRAST with RSCCCT1   [facility] ([facility])    [address]  [facility] [address] [phone]           How do I prepare for my exam? (Food and drink instructions) No Food and Drink Restrictions.  How do I prepare for my exam? (Other instructions) You do not need to do anything special to prepare for this exam. For a sinus scan: Use your nose spray (nasal decongestant spray) as directed.  What should I wear: Please wear loose clothing, such as a sweat suit or jogging clothes. Avoid snaps, zippers and other metal. We may ask you to undress and put on a hospital gown.  How long does the exam take: Most scans take less than 20 minutes.  What should I bring: Please bring any scans or X-rays taken at other hospitals, if similar tests were done. Also bring a list of your medicines, including vitamins, minerals and over-the-counter drugs. It is safest to leave personal items at home.  Do I need a : No  is needed.  What do I need to tell my doctor? Be sure to tell your doctor: * " If you have any allergies. * If there s any chance you are pregnant. * If you are breastfeeding.  What should I do after the exam: No restrictions, You may resume normal activities.  What is this test: A CT (computed tomography) scan is a series of pictures that allows us to look inside your body. The scanner creates images of the body in cross sections, much like slices of bread. This helps us see any problems more clearly.  Who should I call with questions: If you have any questions, please call the Imaging Department where you will have your exam. Directions, parking instructions, and other information is available on our website, [email or web address] ([email or web address].              Who to contact     Please call your clinic at [phone] to:    Ask questions about your health    Make or cancel appointments    Discuss your medicines    Learn about your test results    Speak to your doctor            Additional Information About Your Visit        [employer] Information     [employer] gives you secure access to your electronic health record. If you see a primary care provider, you can also send messages to your care team and make appointments. If you have questions, please call your primary care clinic.  If you do not have a primary care provider, please call [phone] and they will assist you.      [employer] is an electronic gateway that provides easy, online access to your medical records. With [employer], you can request a clinic appointment, read your test results, renew a prescription or communicate with your care team.     To access your existing account, please contact your [facility] or call [phone] for assistance.        Care EveryWhere ID     This is your Care EveryWhere ID. This could be used by other organizations to access your [facility] medical records  [id number]         Blood Pressure from Last 3 Encounters:   01/19/18 128/66   12/06/17 154/80   06/14/17 140/80    Weight from Last 3  Encounters:   01/19/18 68.6 kg (151 lb 4.8 oz)   12/06/17 67.9 kg (149 lb 11.2 oz)   06/14/17 (P) 65.8 kg (145 lb)              We Performed the Following     AUDIOGRAM/TYMPANOGRAM - INTERFACE     [facility] Audiometry Thrshld Eval & Speech Recog ([address])     Tymps / Reflex   ([address])        Primary Care Provider Office Phone # Fax #    Soco Durham [phone]       [address] [facility] [address]  [facility] [address]        Equal Access to Services     [facility]: Hadii aad ku hadasho Soomaali, waaxda luqadaha, qaybta kaalmada adeegyada, dina rubio hayaamartha hartley . So [facility] [phone].    ATENCIÓN: Si habla español, tiene a lee disposición servicios gratuitos de asistencia lingüística. [facility] [phone].    We comply with applicable federal civil rights laws and [place] laws. We do not discriminate on the basis of race, color, national origin, age, disability, sex, sexual orientation, or gender identity.            Thank you!     Thank you for choosing [facility] AUDIOLOGY  for your care. Our goal is always to provide you with excellent care. Hearing back from our patients is one way we can continue to improve our services. Please take a few minutes to complete the written survey that you may receive in the mail after your visit with us. Thank you!             Your Updated Medication List - Protect others around you: Learn how to safely use, store and throw away your medicines at www.disposemymeds.org.          This list is accurate as of 10/29/18 12:01 PM.  Always use your most recent med list.                   Brand Name Dispense Instructions for use Diagnosis    amLODIPine 5 MG tablet    NORVASC    90 tablet    Take 1 tablet (5 mg) by mouth daily    Essential hypertension, benign       aspirin 81 MG tablet     30 tablet    Take 81 mg by mouth daily        Co Q 10 100 MG Caps      Take 1 capsule by mouth daily        fish oil-omega-3 fatty acids 1000 MG capsule     90 capsule     Take 1 g by mouth 2 times daily        LORazepam 0.5 MG tablet    ATIVAN    45 tablet    Take 0.5 tablets (0.25 mg) by mouth daily    Dysthymia       metoprolol tartrate 25 MG tablet    LOPRESSOR    30 tablet    Take 0.5 tablets (12.5 mg) by mouth 2 times daily    Paroxysmal supraventricular tachycardia (H), Essential hypertension, benign       Multi-vitamin Tabs tablet     100 tablet    Take 1 tablet by mouth daily        PROBIOTIC PO      Take by mouth daily        Resveratrol 250 MG Caps      Take 500 mg by mouth daily        simvastatin 20 MG tablet    ZOCOR    45 tablet    Take 0.5 tablets (10 mg) by mouth At Bedtime    Hypercholesteremia       vitamin D 2000 units tablet     100 tablet    Take 2,000 Units by mouth daily

## 2018-10-29 NOTE — PROGRESS NOTES
AUDIOLOGY REPORT    SUBJECTIVE:  Lisa Lopez is a 73 year old female who was seen in Audiology at the [facility] on 10/29/2018  for audiologic evaluation, referred by Soco Durham. The patient reports a increasing listening problems in both ears, but the right may be slightly better. The patient denies  bilateral tinnitus, bilateral otalgia, bilateral drainage, bilateral aural fullness, family history of hearing loss and dizziness.  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  The patient notes difficulty with communication in a variety of listening situations.   OBJECTIVE:  Fall Risk Screen:  1. Have you fallen two or more times in the past year? No  2. Have you fallen and had an injury in the past year? No    Timed Up and Go Score (in seconds): not tested  Is patient a fall risk? No  Referral initiated: No  Fall Risk Assessment Completed by Audiology    Otoscopic exam indicates ears are clear of cerumen bilaterally     Pure Tone Thresholds assessed using conventional audiometry with good  reliability from 250-8000 Hz bilaterally using insert earphones and circumaural headphones     RIGHT:  Mild sloping to severe largely sensorineural hearing loss    LEFT:    Mild sloping to severe largely sensorineural hearing loss    Tympanogram:    RIGHT: normal eardrum mobility    LEFT:   normal eardrum mobility    Reflexes (reported by stimulus ear):  RIGHT: Ipsilateral is present at normal levels  RIGHT: Contralateral is present at normal levels  LEFT:   Ipsilateral is present at normal levels  LEFT:   Contralateral is present at normal levels      Speech Reception Threshold:    RIGHT: 25 dB HL    LEFT:   20 dB HL  Word Recognition Score:     RIGHT: 92% at 70 dB HL using NU-6 recorded word list.    LEFT:   100% at 70 dB HL using NU-6 recorded word list.    ASSESSMENT:   Today s results were discussed with the patient in detail.  At one pitch she has poorer hearing left.    PLAN:  Patient was counseled regarding hearing loss and impact on communication.  Patient is a good candidate for amplification at this time. She is interested in pursueing hearing aids at this location, and appointments were scheduled   It is recommended that the patient return for a hearing aid consultation, and have a hearing recheck to monitor the slight asymmetry.  She should have hearing checked sooner if any change is noted.However, as there are no other symptoms, it may be related to noise exposure more pronounced in the left side.  Please call this clinic with questions regarding these results or recommendations.      Bill Concepcion, CCC-A  Licensed Audiologist  [place] #[id number]    CC:  Dr. Soco Durham

## 2018-11-01 ENCOUNTER — HOSPITAL ENCOUNTER (OUTPATIENT)
Dept: MAMMOGRAPHY | Facility: CLINIC | Age: 73
Discharge: HOME | End: 2018-11-01
Attending: INTERNAL MEDICINE | Admitting: INTERNAL MEDICINE
Payer: MEDICARE

## 2018-11-01 DIAGNOSIS — Z12.31 SCREENING MAMMOGRAM, ENCOUNTER FOR: ICD-10-CM

## 2018-11-01 PROCEDURE — 77067 SCR MAMMO BI INCL CAD: CPT

## 2018-11-15 DIAGNOSIS — I47.10 PAROXYSMAL SUPRAVENTRICULAR TACHYCARDIA (H): ICD-10-CM

## 2018-11-15 DIAGNOSIS — I10 ESSENTIAL HYPERTENSION, BENIGN: ICD-10-CM

## 2018-11-15 NOTE — TELEPHONE ENCOUNTER
"Requested Prescriptions   Pending Prescriptions Disp Refills     metoprolol tartrate (LOPRESSOR) 25 MG tablet  Last Written Prescription Date:  7/9/18  Last Fill Quantity: 30,  # refills: 4   Last office visit: 12/6/2017 with prescribing provider:     Future Office Visit:     30 tablet 4     Sig: Take 0.5 tablets (12.5 mg) by mouth 2 times daily    Beta-Blockers Protocol Passed    11/15/2018  9:27 AM       Passed - Blood pressure under 140/90 in past 12 months    BP Readings from Last 3 Encounters:   01/19/18 128/66   12/06/17 154/80   06/14/17 140/80                Passed - Patient is age 6 or older       Passed - Recent (12 mo) or future (30 days) visit within the authorizing provider's specialty    Patient had office visit in the last 12 months or has a visit in the next 30 days with authorizing provider or within the authorizing provider's specialty.  See \"Patient Info\" tab in inbasket, or \"Choose Columns\" in Meds & Orders section of the refill encounter.                "

## 2018-11-19 ENCOUNTER — OFFICE VISIT (OUTPATIENT)
Dept: AUDIOLOGY | Facility: CLINIC | Age: 73
End: 2018-11-19
Payer: COMMERCIAL

## 2018-11-19 DIAGNOSIS — H90.3 SENSORY HEARING LOSS, BILATERAL: Primary | ICD-10-CM

## 2018-11-19 RX ORDER — METOPROLOL TARTRATE 25 MG/1
12.5 TABLET, FILM COATED ORAL 2 TIMES DAILY
Qty: 30 TABLET | Refills: 0 | Status: SHIPPED | OUTPATIENT
Start: 2018-11-19 | End: 2018-12-13

## 2018-11-19 NOTE — PROGRESS NOTES
AUDIOLOGY REPORT    SUBJECTIVE: Lisa Lopez is a 73 year old female was seen in the Audiology Clinic at  [facility] on 11/19/18 to discuss concerns with hearing and functional communication difficulties.  Lisa has been seen previously on 10/29/2018, and results revealed a normal sloping to severe high frequency sensorineural hearing loss bilaterally. She has slightly poorer hearing left with no other symptoms which will be monitoredYvonne notes difficulty with communication in a variety of listening situations.    OBJECTIVE:  Patient is a hearing aid candidate. Patient would like to move forward with a hearing aid evaluation today. Therefore, the patient was presented with different options for amplification to help aid in communication. Discussed styles, levels of technology and monaural vs. binaural fitting.     The hearing aid(s) mutually chosen were:  Binaural: Seimens Pure 3NX charge and Go  COLOR: Joslyn Brown  BATTERY SIZE: Rechargeable  EARMOLD/TIPS: Semi-open  CANAL/ LENGTH: 1S    Otoscopy revealed ears are clear of cerumen bilaterally.     ASSESSMENT:   Reviewed purchase information and warranty information with patient. The 45 day trial period was explained to patient. The patient was given a copy of the [place] Department of Health consumer brochure on purchasing hearing instruments. Patient risk factors have been provided to the patient in writing prior to the sale of the hearing aid per FDA regulation. The risk factors are also available in the User Instructional Booklet to be presented on the day of the hearing aid fitting. Hearing aid(s) ordered. Hearing aid evaluation completed.    PLAN: Lisa is scheduled to return in 2-3 weeks for a hearing aid fitting and programming. Purchase agreement will be completed on that date. Please contact this clinic with any questions or concerns.        Bill Concepcion, CCC-A  Licensed Audiologist  [place]  #[id number]

## 2018-11-19 NOTE — MR AVS SNAPSHOT
After Visit Summary   11/19/2018    Lisa Lopez    MRN: [id number]           Patient Information     Date Of Birth          1945        Visit Information        Provider Department      11/19/2018 9:00 AM Rosario Lynn AuD M [facility] Audiology        Today's Diagnoses     Sensory hearing loss, bilateral    -  1       Follow-ups after your visit        Your next 10 appointments already scheduled     Dec 06, 2018 10:30 AM CST   CT CHEST W/O CONTRAST with RSCCCT1   [facility] ([facility])    [address]  [facility] [id number] [phone]           How do I prepare for my exam? (Food and drink instructions) No Food and Drink Restrictions.  How do I prepare for my exam? (Other instructions) You do not need to do anything special to prepare for this exam. For a sinus scan: Use your nose spray (nasal decongestant spray) as directed.  What should I wear: Please wear loose clothing, such as a sweat suit or jogging clothes. Avoid snaps, zippers and other metal. We may ask you to undress and put on a hospital gown.  How long does the exam take: Most scans take less than 20 minutes.  What should I bring: Please bring any scans or X-rays taken at other hospitals, if similar tests were done. Also bring a list of your medicines, including vitamins, minerals and over-the-counter drugs. It is safest to leave personal items at home.  Do I need a : No  is needed.  What do I need to tell my doctor? Be sure to tell your doctor: * If you have any allergies. * If there s any chance you are pregnant. * If you are breastfeeding.  What should I do after the exam: No restrictions, You may resume normal activities.  What is this test: A CT (computed tomography) scan is a series of pictures that allows us to look inside your body. The scanner creates images of the body in cross sections, much like slices of bread. This helps us see  any problems more clearly.  Who should I call with questions: If you have any questions, please call the Imaging Department where you will have your exam. Directions, parking instructions, and other information is available on our website, [email or web address].            Dec 11, 2018  3:00 PM CST   Hearing Aid Fitting with Matheus Mcduffie [facility] Audiology ([facility])    [address] [place] [address] [phone]            Jan 14, 2019  4:00 PM CST   (Arrive by 3:45 PM)   Initial Review Program with Matheus Mcduffie [facility] Audiology ([facility])    [address] [address] [phone]              Who to contact     Please call your clinic at [phone] to:    Ask questions about your health    Make or cancel appointments    Discuss your medicines    Learn about your test results    Speak to your doctor            Additional Information About Your Visit        [email or web address] Information     [email or web address] gives you secure access to your electronic health record. If you see a primary care provider, you can also send messages to your care team and make appointments. If you have questions, please call your primary care clinic.  If you do not have a primary care provider, please call [phone] and they will assist you.      [email or web address] is an electronic gateway that provides easy, online access to your medical records. With [email or web address], you can request a clinic appointment, read your test results, renew a prescription or communicate with your care team.     To access your existing account, please contact your [facility] or call [phone] for assistance.        Care EveryWhere ID     This is your Care EveryWhere ID. This could be used by other organizations to access your [place] [place] medical records  [id number]         Blood Pressure from Last 3 Encounters:    01/19/18 128/66   12/06/17 154/80   06/14/17 140/80    Weight from Last 3 Encounters:   01/19/18 68.6 kg (151 lb 4.8 oz)   12/06/17 67.9 kg (149 lb 11.2 oz)   06/14/17 (P) 65.8 kg (145 lb)              We Performed the Following     Hearing Aid Exam, Binaural (03816)        Primary Care Provider Office Phone # Fax #    Soco Durham [phone]       [address] [facility] [address]  [facility] [address]        Equal Access to Services     [facility]: Hadii aad ku hadasho Soomaali, waaxda luqadaha, qaybta kaalmada adeegyada, waxmiya rubio hayaan ademerritt hartley . So [facility] [phone].    ATENCIÓN: Si habla español, tiene a lee disposición servicios gratuitos de asistencia lingüística. Llame al [phone].    We comply with applicable federal civil rights laws and [place] laws. We do not discriminate on the basis of race, color, national origin, age, disability, sex, sexual orientation, or gender identity.            Thank you!     Thank you for choosing [facility] AUDIOLOGY  for your care. Our goal is always to provide you with excellent care. Hearing back from our patients is one way we can continue to improve our services. Please take a few minutes to complete the written survey that you may receive in the mail after your visit with us. Thank you!             Your Updated Medication List - Protect others around you: Learn how to safely use, store and throw away your medicines at www.disposemymeds.org.          This list is accurate as of 11/19/18 10:03 AM.  Always use your most recent med list.                   Brand Name Dispense Instructions for use Diagnosis    amLODIPine 5 MG tablet    NORVASC    90 tablet    Take 1 tablet (5 mg) by mouth daily    Essential hypertension, benign       aspirin 81 MG tablet     30 tablet    Take 81 mg by mouth daily        Co Q 10 100 MG Caps      Take 1 capsule by mouth daily        fish oil-omega-3 fatty acids 1000 MG capsule     90 capsule    Take 1  g by mouth 2 times daily        LORazepam 0.5 MG tablet    ATIVAN    45 tablet    Take 0.5 tablets (0.25 mg) by mouth daily    Dysthymia       metoprolol tartrate 25 MG tablet    LOPRESSOR    30 tablet    Take 0.5 tablets (12.5 mg) by mouth 2 times daily    Paroxysmal supraventricular tachycardia (H), Essential hypertension, benign       Multi-vitamin Tabs tablet     100 tablet    Take 1 tablet by mouth daily        PROBIOTIC PO      Take by mouth daily        Resveratrol 250 MG Caps      Take 500 mg by mouth daily        simvastatin 20 MG tablet    ZOCOR    45 tablet    Take 0.5 tablets (10 mg) by mouth At Bedtime    Hypercholesteremia       vitamin D3 2000 units tablet    CHOLECALCIFEROL    100 tablet    Take 2,000 Units by mouth daily

## 2018-11-19 NOTE — TELEPHONE ENCOUNTER
Medication is being filled for 1 time refill only due to:  Patient needs to be seen because due for OV in December.

## 2018-11-28 DIAGNOSIS — I10 ESSENTIAL HYPERTENSION, BENIGN: ICD-10-CM

## 2018-11-28 NOTE — TELEPHONE ENCOUNTER
"Requested Prescriptions   Pending Prescriptions Disp Refills     amLODIPine (NORVASC) 5 MG tablet  Last Written Prescription Date:  9/5/18  Last Fill Quantity: 90,  # refills: 0   Last office visit: 12/6/2017 with prescribing provider:  Marva   Future Office Visit:     90 tablet 0     Sig: Take 1 tablet (5 mg) by mouth daily    Calcium Channel Blockers Protocol  Passed    11/28/2018 11:54 AM       Passed - Blood pressure under 140/90 in past 12 months    BP Readings from Last 3 Encounters:   01/19/18 128/66   12/06/17 154/80   06/14/17 140/80                Passed - Recent (12 mo) or future (30 days) visit within the authorizing provider's specialty    Patient had office visit in the last 12 months or has a visit in the next 30 days with authorizing provider or within the authorizing provider's specialty.  See \"Patient Info\" tab in inbasket, or \"Choose Columns\" in Meds & Orders section of the refill encounter.             Passed - Patient is age 18 or older       Passed - No active pregnancy on record       Passed - Normal serum creatinine on file in past 12 months    Recent Labs   Lab Test  12/06/17   1611   CR  0.75            Passed - No positive pregnancy test in past 12 months          "

## 2018-11-29 RX ORDER — AMLODIPINE BESYLATE 5 MG/1
5 TABLET ORAL DAILY
Qty: 90 TABLET | Refills: 0 | Status: SHIPPED | OUTPATIENT
Start: 2018-11-29 | End: 2019-02-27

## 2018-12-06 ENCOUNTER — HOSPITAL ENCOUNTER (OUTPATIENT)
Dept: CT IMAGING | Facility: CLINIC | Age: 73
Discharge: HOME | End: 2018-12-06
Attending: INTERNAL MEDICINE | Admitting: INTERNAL MEDICINE
Payer: MEDICARE

## 2018-12-06 DIAGNOSIS — R91.1 PULMONARY NODULE: ICD-10-CM

## 2018-12-06 PROCEDURE — 71250 CT THORAX DX C-: CPT

## 2018-12-11 ASSESSMENT — ENCOUNTER SYMPTOMS
PALPITATIONS: 0
NERVOUS/ANXIOUS: 0
FEVER: 0
ABDOMINAL PAIN: 0
CONSTIPATION: 0
CHILLS: 0
HEMATOCHEZIA: 0
BREAST MASS: 0
DIARRHEA: 0
WEAKNESS: 0
HEMATURIA: 0
COUGH: 1
JOINT SWELLING: 0
SHORTNESS OF BREATH: 0
ARTHRALGIAS: 0
SORE THROAT: 0
NAUSEA: 0
EYE PAIN: 1
DIZZINESS: 1
DYSURIA: 0
HEARTBURN: 0
MYALGIAS: 0
FREQUENCY: 0
PARESTHESIAS: 0
HEADACHES: 0

## 2018-12-11 ASSESSMENT — ACTIVITIES OF DAILY LIVING (ADL): CURRENT_FUNCTION: NO ASSISTANCE NEEDED

## 2018-12-13 ENCOUNTER — OFFICE VISIT (OUTPATIENT)
Dept: INTERNAL MEDICINE | Facility: CLINIC | Age: 73
End: 2018-12-13
Payer: COMMERCIAL

## 2018-12-13 VITALS
TEMPERATURE: 97.9 F | OXYGEN SATURATION: 99 % | HEART RATE: 64 BPM | HEIGHT: 66 IN | BODY MASS INDEX: 23.51 KG/M2 | DIASTOLIC BLOOD PRESSURE: 70 MMHG | WEIGHT: 146.3 LBS | SYSTOLIC BLOOD PRESSURE: 130 MMHG | RESPIRATION RATE: 17 BRPM

## 2018-12-13 DIAGNOSIS — Z00.00 PREVENTATIVE HEALTH CARE: Primary | ICD-10-CM

## 2018-12-13 DIAGNOSIS — I10 ESSENTIAL HYPERTENSION, BENIGN: ICD-10-CM

## 2018-12-13 DIAGNOSIS — I47.10 PAROXYSMAL SUPRAVENTRICULAR TACHYCARDIA (H): ICD-10-CM

## 2018-12-13 DIAGNOSIS — H10.13 ALLERGIC CONJUNCTIVITIS, BILATERAL: ICD-10-CM

## 2018-12-13 DIAGNOSIS — E78.00 HYPERCHOLESTEREMIA: ICD-10-CM

## 2018-12-13 LAB
ALBUMIN SERPL-MCNC: 3.7 G/DL (ref 3.4–5)
ALP SERPL-CCNC: 68 U/L (ref 40–150)
ALT SERPL W P-5'-P-CCNC: 35 U/L (ref 0–50)
ANION GAP SERPL CALCULATED.3IONS-SCNC: 8 MMOL/L (ref 3–14)
AST SERPL W P-5'-P-CCNC: 22 U/L (ref 0–45)
BILIRUB SERPL-MCNC: 0.5 MG/DL (ref 0.2–1.3)
BUN SERPL-MCNC: 19 MG/DL (ref 7–30)
CALCIUM SERPL-MCNC: 9.6 MG/DL (ref 8.5–10.1)
CHLORIDE SERPL-SCNC: 108 MMOL/L (ref 94–109)
CHOLEST SERPL-MCNC: 172 MG/DL
CO2 SERPL-SCNC: 24 MMOL/L (ref 20–32)
CREAT SERPL-MCNC: 0.82 MG/DL (ref 0.52–1.04)
ERYTHROCYTE [DISTWIDTH] IN BLOOD BY AUTOMATED COUNT: 13 % (ref 10–15)
GFR SERPL CREATININE-BSD FRML MDRD: 68 ML/MIN/1.7M2
GLUCOSE SERPL-MCNC: 96 MG/DL (ref 70–99)
HCT VFR BLD AUTO: 41.4 % (ref 35–47)
HDLC SERPL-MCNC: 52 MG/DL
HGB BLD-MCNC: 13.8 G/DL (ref 11.7–15.7)
LDLC SERPL CALC-MCNC: 94 MG/DL
MCH RBC QN AUTO: 31 PG (ref 26.5–33)
MCHC RBC AUTO-ENTMCNC: 33.3 G/DL (ref 31.5–36.5)
MCV RBC AUTO: 93 FL (ref 78–100)
NONHDLC SERPL-MCNC: 120 MG/DL
PLATELET # BLD AUTO: 325 10E9/L (ref 150–450)
POTASSIUM SERPL-SCNC: 4.7 MMOL/L (ref 3.4–5.3)
PROT SERPL-MCNC: 7.5 G/DL (ref 6.8–8.8)
RBC # BLD AUTO: 4.45 10E12/L (ref 3.8–5.2)
SODIUM SERPL-SCNC: 140 MMOL/L (ref 133–144)
TRIGL SERPL-MCNC: 131 MG/DL
TSH SERPL DL<=0.005 MIU/L-ACNC: 3.68 MU/L (ref 0.4–4)
WBC # BLD AUTO: 6.1 10E9/L (ref 4–11)

## 2018-12-13 PROCEDURE — 36415 COLL VENOUS BLD VENIPUNCTURE: CPT | Performed by: INTERNAL MEDICINE

## 2018-12-13 PROCEDURE — 80053 COMPREHEN METABOLIC PANEL: CPT | Performed by: INTERNAL MEDICINE

## 2018-12-13 PROCEDURE — 84443 ASSAY THYROID STIM HORMONE: CPT | Performed by: INTERNAL MEDICINE

## 2018-12-13 PROCEDURE — 85027 COMPLETE CBC AUTOMATED: CPT | Performed by: INTERNAL MEDICINE

## 2018-12-13 PROCEDURE — 80061 LIPID PANEL: CPT | Performed by: INTERNAL MEDICINE

## 2018-12-13 PROCEDURE — 99397 PER PM REEVAL EST PAT 65+ YR: CPT | Performed by: INTERNAL MEDICINE

## 2018-12-13 RX ORDER — METOPROLOL TARTRATE 25 MG/1
12.5 TABLET, FILM COATED ORAL 2 TIMES DAILY
Qty: 90 TABLET | Refills: 3 | Status: SHIPPED | OUTPATIENT
Start: 2018-12-13 | End: 2019-12-31

## 2018-12-13 ASSESSMENT — ENCOUNTER SYMPTOMS
BREAST MASS: 0
MYALGIAS: 0
HEARTBURN: 0
CHILLS: 0
HEMATOCHEZIA: 0
ABDOMINAL PAIN: 0
CONSTIPATION: 0
NAUSEA: 0
HEADACHES: 0
FREQUENCY: 0
DYSURIA: 0
EYE PAIN: 1
WEAKNESS: 0
ARTHRALGIAS: 0
PARESTHESIAS: 0
COUGH: 1
DIZZINESS: 1
PALPITATIONS: 0
SHORTNESS OF BREATH: 0
NERVOUS/ANXIOUS: 0
SORE THROAT: 0
DIARRHEA: 0
FEVER: 0
JOINT SWELLING: 0
HEMATURIA: 0

## 2018-12-13 ASSESSMENT — PATIENT HEALTH QUESTIONNAIRE - PHQ9: 5. POOR APPETITE OR OVEREATING: NOT AT ALL

## 2018-12-13 ASSESSMENT — ANXIETY QUESTIONNAIRES

## 2018-12-13 ASSESSMENT — MIFFLIN-ST. JEOR: SCORE: 1167.42

## 2018-12-13 ASSESSMENT — ACTIVITIES OF DAILY LIVING (ADL): CURRENT_FUNCTION: NO ASSISTANCE NEEDED

## 2018-12-13 NOTE — PROGRESS NOTES
"SUBJECTIVE:   Lisa Lopez is a 73 year old female who presents for Preventive Visit.    Are you in the first 12 months of your Medicare coverage?  No    Annual Wellness Visit     In general, how would you rate your overall health?  Good    Frequency of exercise:  2-3 days/week    Duration of exercise:  15-30 minutes    Do you usually eat at least 4 servings of fruit and vegetables a day, include whole grains    & fiber and avoid regularly eating high fat or \"junk\" foods?  Yes    Taking medications regularly:  Yes    Medication side effects:  None    Ability to successfully perform activities of daily living:  No assistance needed    Home Safety:  No safety concerns identified    Hearing Impairment:  No hearing concerns    In the past 6 months, have you been bothered by leaking of urine?  No    In general, how would you rate your overall mental or emotional health?  Good    PHQ-2 Total Score: 0    Additional concerns today:  Yes    Do you feel safe in your environment? Yes    Do you have a Health Care Directive? Yes: Advance Directive has been received and scanned.Fallen 2 or more times in the past year?: No  Any fall with injury in the past year?: No      Fall risk    click delete button to remove this line now  Cognitive Screening   1) Repeat 3 items (Leader, Season, Table)    2) Clock draw: NORMAL  3) 3 item recall: Recalls 2 objects   Results: NORMAL clock, 1-2 items recalled: COGNITIVE IMPAIRMENT LESS LIKELY    Mini-CogTM Copyright TOMASA Wiley. Licensed by the author for use in [facility]; reprinted with permission (georgi@.[facility]). All rights reserved.      Do you have sleep apnea, excessive snoring or daytime drowsiness?: no    Reviewed and updated as needed this visit by clinical staff         Reviewed and updated as needed this visit by Provider        Social History     Tobacco Use     Smoking status: Former Smoker     Start date: 4/15/1974     Smokeless tobacco: Never Used     Tobacco " comment: Smoked [age] yo.   Substance Use Topics     Alcohol use: Yes     Alcohol/week: 0.0 oz     Comment: minimal - 2 glasses of wine monthly       Alcohol Use 12/11/2018   If you drink alcohol do you typically have greater than 3 drinks per day OR greater than 7 drinks per week? No   No flowsheet data found.            Current providers sharing in care for this patient include:   Patient Care Team:  Soco Durham MD as PCP - General (Internal Medicine)    The following health maintenance items are reviewed in Epic and correct as of today:  Health Maintenance   Topic Date Due     DTAP/TDAP/TD IMMUNIZATION (1 - Tdap) 07/12/1950     ZOSTER IMMUNIZATION (1 of 2) 07/12/1993     PNEUMOVAX IMMUNIZATION 65+ LOW/MEDIUM RISK (1 of 2 - PCV13) 07/12/2008     DORIS QUESTIONNAIRE 1 YEAR  12/28/2016     FALL RISK ASSESSMENT  12/06/2018     PHQ-9 Q6 MONTHS  01/17/2019     MAMMO SCREEN Q2 YR (SYSTEM ASSIGNED)  11/01/2020     ADVANCE DIRECTIVE PLANNING Q5 YRS  12/30/2020     LIPID SCREEN Q5 YR FEMALE (SYSTEM ASSIGNED)  12/06/2022     COLON CANCER SCREEN (SYSTEM ASSIGNED)  03/23/2027     DEXA SCAN SCREENING (SYSTEM ASSIGNED)  Completed     DEPRESSION ACTION PLAN  Completed     INFLUENZA VACCINE  Completed     IPV IMMUNIZATION  Aged Out     MENINGITIS IMMUNIZATION  Aged Out     BP Readings from Last 3 Encounters:   12/13/18 130/70   01/19/18 128/66   12/06/17 154/80    Wt Readings from Last 3 Encounters:   12/13/18 66.4 kg (146 lb 4.8 oz)   01/19/18 68.6 kg (151 lb 4.8 oz)   12/06/17 67.9 kg (149 lb 11.2 oz)                      Review of Systems   Constitutional: Negative for chills and fever.   HENT: Positive for congestion. Negative for ear pain, hearing loss and sore throat.    Eyes: Positive for pain. Negative for visual disturbance.   Respiratory: Positive for cough. Negative for shortness of breath.    Cardiovascular: Negative for chest pain, palpitations and peripheral edema.   Gastrointestinal: Negative for abdominal  "pain, constipation, diarrhea, heartburn, hematochezia and nausea.   Breasts:  Negative for tenderness, breast mass and discharge.   Genitourinary: Negative for dysuria, frequency, genital sores, hematuria, pelvic pain, urgency, vaginal bleeding and vaginal discharge.   Musculoskeletal: Negative for arthralgias, joint swelling and myalgias.   Skin: Negative for rash.   Neurological: Positive for dizziness. Negative for weakness, headaches and paresthesias.   Psychiatric/Behavioral: Negative for mood changes. The patient is not nervous/anxious.      chronic dry eyes    OBJECTIVE:   There were no vitals taken for this visit. Estimated body mass index is 24.79 kg/m  as calculated from the following:    Height as of 1/19/18: 1.664 m (5' 5.5\").    Weight as of 1/19/18: 68.6 kg (151 lb 4.8 oz).  Physical Exam  GENERAL: healthy, alert and no distress  EYES: Eyes grossly normal to inspection, PERRL and conjunctivae and sclerae normal  HENT: ear canals and TM's normal, nose and mouth without ulcers or lesions  NECK: no adenopathy, no asymmetry, masses, or scars and thyroid normal to palpation  RESP: lungs clear to auscultation - no rales, rhonchi or wheezes    CV: regular rate and rhythm, normal S1 S2, no S3 or S4, no murmur, click or rub, no peripheral edema and peripheral pulses strong  ABDOMEN: soft, nontender, no hepatosplenomegaly, no masses and bowel sounds normal  MS: no gross musculoskeletal defects noted, no edema  SKIN: no suspicious lesions or rashes  NEURO: Normal strength and tone, mentation intact and speech normal  PSYCH: mentation appears normal, affect normal/bright    ASSESSMENT / PLAN:   1. Preventative health care  Update labs    2. Paroxysmal supraventricular tachycardia (H)  Stable. Continue current medications.     3. Essential hypertension, benign  under good control Continue current medications.     4. Hypercholesteremia  Due for fasting lipid panel       End of Life Planning:  Patient currently has " "an advanced directive: No.  I have verified the patient's ablity to prepare an advanced directive/make health care decisions.  Literature was provided to assist patient in preparing an advanced directive.    COUNSELING:  Reviewed preventive health counseling, as reflected in patient instructions       Regular exercise       Healthy diet/nutrition    BP Readings from Last 1 Encounters:   01/19/18 128/66     Estimated body mass index is 24.79 kg/m  as calculated from the following:    Height as of 1/19/18: 1.664 m (5' 5.5\").    Weight as of 1/19/18: 68.6 kg (151 lb 4.8 oz).           reports that she has quit smoking. She started smoking about 44 years ago. she has never used smokeless tobacco.      Appropriate preventive services were discussed with this patient, including applicable screening as appropriate for cardiovascular disease, diabetes, osteopenia/osteoporosis, and glaucoma.  As appropriate for age/gender, discussed screening for colorectal cancer, prostate cancer, breast cancer, and cervical cancer. Checklist reviewing preventive services available has been given to the patient.    Reviewed patients plan of care and provided an AVS. The Basic Care Plan (routine screening as documented in Health Maintenance) for Lisa meets the Care Plan requirement. This Care Plan has been established and reviewed with the Patient.    Counseling Resources:  ATP IV Guidelines  Pooled Cohorts Equation Calculator  Breast Cancer Risk Calculator  FRAX Risk Assessment  ICSI Preventive Guidelines  Dietary Guidelines for Americans, 2010  USDA's MyPlate  ASA Prophylaxis  Lung CA Screening    Soco Durham MD  [facility]  "

## 2018-12-13 NOTE — PATIENT INSTRUCTIONS
Preventive Health Recommendations    See your health care provider every year to    Review health changes.     Discuss preventive care.      Review your medicines if your doctor has prescribed any.      You no longer need a yearly Pap test unless you've had an abnormal Pap test in the past 10 years. If you have vaginal symptoms, such as bleeding or discharge, be sure to talk with your provider about a Pap test.      Every 1 to 2 years, have a mammogram.  If you are over 69, talk with your health care provider about whether or not you want to continue having screening mammograms.      Every 10 years, have a colonoscopy. Or, have a yearly FIT test (stool test). These exams will check for colon cancer.       Have a cholesterol test every 5 years, or more often if your doctor advises it.       Have a diabetes test (fasting glucose) every three years. If you are at risk for diabetes, you should have this test more often.       At age 65, have a bone density scan (DEXA) to check for osteoporosis (brittle bone disease).    Shots:    Get a flu shot each year.    Get a tetanus shot every 10 years.    Talk to your doctor about your pneumonia vaccines. There are now two you should receive - Pneumovax (PPSV 23) and Prevnar (PCV 13).    Talk to your pharmacist about the shingles vaccine.    Talk to your doctor about the hepatitis B vaccine.    Nutrition:     Eat at least 5 servings of fruits and vegetables each day.      Eat whole-grain bread, whole-wheat pasta and brown rice instead of white grains and rice.      Get adequate Calcium and Vitamin D.     Lifestyle    Exercise at least 150 minutes a week (30 minutes a day, 5 days a week). This will help you control your weight and prevent disease.      Limit alcohol to one drink per day.      No smoking.       Wear sunscreen to prevent skin cancer.       See your dentist twice a year for an exam and cleaning.      See your eye doctor every 1 to 2 years to screen for conditions  such as glaucoma, macular degeneration and cataracts.    Personalized Prevention Plan  You are due for the preventive services outlined below.  Your care team is available to assist you in scheduling these services.  If you have already completed any of these items, please share that information with your care team to update in your medical record.  Health Maintenance Due   Topic Date Due     Diptheria Tetanus Pertussis (DTAP/TDAP/TD) Vaccine (1 - Tdap) 07/12/1950     Zoster (Chicken Pox) Vaccine (1 of 2) 07/12/1993     Pneumococcal Vaccine (1 of 2 - PCV13) 07/12/2008     DORIS QUESTIONNAIRE 1 YEAR  12/28/2016     FALL RISK ASSESSMENT  12/06/2018

## 2018-12-14 ASSESSMENT — ANXIETY QUESTIONNAIRES: GAD7 TOTAL SCORE: 0

## 2019-01-21 ENCOUNTER — OFFICE VISIT (OUTPATIENT)
Dept: AUDIOLOGY | Facility: CLINIC | Age: 74
End: 2019-01-21
Payer: COMMERCIAL

## 2019-01-21 ENCOUNTER — OFFICE VISIT (OUTPATIENT)
Dept: CARDIOLOGY | Facility: CLINIC | Age: 74
End: 2019-01-21
Payer: COMMERCIAL

## 2019-01-21 VITALS
BODY MASS INDEX: 23.45 KG/M2 | DIASTOLIC BLOOD PRESSURE: 68 MMHG | SYSTOLIC BLOOD PRESSURE: 128 MMHG | WEIGHT: 145.9 LBS | HEART RATE: 64 BPM | HEIGHT: 66 IN

## 2019-01-21 DIAGNOSIS — H90.3 SENSORY HEARING LOSS, BILATERAL: Primary | ICD-10-CM

## 2019-01-21 DIAGNOSIS — I47.10 PAROXYSMAL SUPRAVENTRICULAR TACHYCARDIA (H): Primary | ICD-10-CM

## 2019-01-21 DIAGNOSIS — E78.00 HYPERCHOLESTEREMIA: ICD-10-CM

## 2019-01-21 DIAGNOSIS — I10 BENIGN ESSENTIAL HYPERTENSION: ICD-10-CM

## 2019-01-21 DIAGNOSIS — Z82.49 FAMILY HISTORY OF ISCHEMIC HEART DISEASE: ICD-10-CM

## 2019-01-21 PROCEDURE — 99214 OFFICE O/P EST MOD 30 MIN: CPT | Performed by: INTERNAL MEDICINE

## 2019-01-21 RX ORDER — SIMVASTATIN 20 MG
10 TABLET ORAL AT BEDTIME
Qty: 90 TABLET | Refills: 3 | Status: SHIPPED | OUTPATIENT
Start: 2019-01-21 | End: 2019-12-31

## 2019-01-21 ASSESSMENT — MIFFLIN-ST. JEOR: SCORE: 1165.61

## 2019-01-21 NOTE — LETTER
1/21/2019    Soco Durham MD  [address]  [facility] [address]    RE: Lisa Lopez       Dear Colleague,    I had the pleasure of seeing Lisa Lopez in the [facility].    HPI and Plan:   See dictation    No orders of the defined types were placed in this encounter.      Orders Placed This Encounter   Medications     simvastatin (ZOCOR) 20 MG tablet     Sig: Take 0.5 tablets (10 mg) by mouth At Bedtime     Dispense:  90 tablet     Refill:  3       Medications Discontinued During This Encounter   Medication Reason     simvastatin (ZOCOR) 20 MG tablet Reorder         Encounter Diagnosis   Name Primary?     Hypercholesteremia        CURRENT MEDICATIONS:  Current Outpatient Medications   Medication Sig Dispense Refill     amLODIPine (NORVASC) 5 MG tablet Take 1 tablet (5 mg) by mouth daily 90 tablet 0     aspirin 81 MG tablet Take 81 mg by mouth daily  30 tablet      Cholecalciferol (VITAMIN D) 2000 UNITS tablet Take 2,000 Units by mouth daily 100 tablet 3     Coenzyme Q10 (CO Q 10) 100 MG CAPS Take 1 capsule by mouth daily        fish oil-omega-3 fatty acids (OMEGA 3) 1000 MG capsule Take 1 g by mouth 2 times daily  90 capsule      LORazepam (ATIVAN) 0.5 MG tablet Take 0.5 tablets (0.25 mg) by mouth daily 45 tablet 1     metoprolol tartrate (LOPRESSOR) 25 MG tablet Take 0.5 tablets (12.5 mg) by mouth 2 times daily 90 tablet 3     multivitamin, therapeutic with minerals (MULTI-VITAMIN) TABS Take 1 tablet by mouth daily 100 tablet 3     Probiotic Product (PROBIOTIC PO) Take by mouth daily       Resveratrol 250 MG CAPS Take 500 mg by mouth daily       simvastatin (ZOCOR) 20 MG tablet Take 0.5 tablets (10 mg) by mouth At Bedtime 90 tablet 3       ALLERGIES   No Known Allergies    PAST MEDICAL HISTORY:  Past Medical History:   Diagnosis Date     Cough 4/15/2014     Dysthymia 4/15/2014     Essential hypertension, benign 4/15/2014     Hypercholesteremia  4/15/2014     Paroxysmal supraventricular tachycardia (H) 4/15/2014       PAST SURGICAL HISTORY:  Past Surgical History:   Procedure Laterality Date     ABDOMEN SURGERY  1972    Tubal ligation     APPENDECTOMY  Unsure    Removed when had vaginal hysterectomy.     COLONOSCOPY N/A 3/23/2017    Procedure: COMBINED COLONOSCOPY, SINGLE OR MULTIPLE BIOPSY/POLYPECTOMY BY BIOPSY;  Surgeon: Devante Sanchez MD;  Location: [facility]     HYSTERECTOMY, PAP NO LONGER INDICATED         FAMILY HISTORY:  Family History   Problem Relation Age of Onset     Diabetes Father 60         68 yo     Asthma Father         Mild     Heart Disease Mother          68 yo      Hypertension Mother         , heart attack, age 67     Hyperlipidemia Mother      Heart Disease Brother          75 yo     Heart Disease Brother          78 yo     Heart Disease Brother          78 yo      Heart Disease Brother 67        Triple bypass     Family History Negative Sister          62 yo MVA      Family History Negative Sister      Family History Negative Daughter      Family History Negative Daughter      Diabetes Sister      Hypertension Sister         , car accident, age 61     Hyperlipidemia Sister      Diabetes Brother      Hypertension Brother         , car accident, age 61     Hyperlipidemia Brother      Hypertension Sister      Hyperlipidemia Sister      Hypertension Brother          following heart surgery, age 77     Hyperlipidemia Brother      Hypertension Brother      Hyperlipidemia Brother      Hypertension Brother         ,stroke after heart surgery     Hyperlipidemia Brother      Cerebrovascular Disease Brother         After heart surgery, from stroke, age 77     Other Cancer Daughter         Brain tumor, ; surgery.       SOCIAL HISTORY:  Social History     Socioeconomic History     Marital status:      Spouse name: None     Number of  children: None     Years of education: None     Highest education level: None   Social Needs     Financial resource strain: None     Food insecurity - worry: None     Food insecurity - inability: None     Transportation needs - medical: None     Transportation needs - non-medical: None   Occupational History     Employer: RETIRED   Tobacco Use     Smoking status: Former Smoker     Years: 5.00     Types: Cigarettes     Start date: 1963     Last attempt to quit: 1968     Years since quittin.0     Smokeless tobacco: Never Used     Tobacco comment: Smoked [age] yo.   Substance and Sexual Activity     Alcohol use: Yes     Alcohol/week: 0.0 oz     Comment: 1-2 glasses of wine or beer a week.     Drug use: No     Sexual activity: Yes     Partners: Male   Other Topics Concern      Service Not Asked     Blood Transfusions Not Asked     Caffeine Concern No     Comment: not caffeine      Occupational Exposure Not Asked     Hobby Hazards Not Asked     Sleep Concern Not Asked     Stress Concern Not Asked     Weight Concern Not Asked     Special Diet No     Comment: regular diet      Back Care Not Asked     Exercise Yes     Comment: 3 times a week at gym      Bike Helmet Not Asked     Seat Belt Not Asked     Self-Exams Not Asked     Parent/sibling w/ CABG, MI or angioplasty before 65F 55M? Yes     Comment: Mother; heart attack age 53. brother Renato age 53 angioplast   Social History Narrative     None       Review of Systems:  Skin:  Negative       Eyes:  Positive for glasses    ENT:  Positive for hearing loss    Respiratory:  Negative       Cardiovascular:  Negative for      Gastroenterology: Negative      Genitourinary:  Negative      Musculoskeletal:  Negative      Neurologic:  Negative      Psychiatric:  Positive for sleep disturbances occ trouble sleeping  Heme/Lymph/Imm:  Negative      Endocrine:  Negative        Physical Exam:  Vitals: /68 (BP Location: Right arm, Patient Position: Sitting,  "Cuff Size: Adult Regular)   Pulse 64   Ht 1.664 m (5' 5.5\")   Wt 66.2 kg (145 lb 14.4 oz)   Breastfeeding? No   BMI 23.91 kg/m       Constitutional:  cooperative, alert and oriented, well developed, well nourished, in no acute distress        Skin:  warm and dry to the touch          Head:  normocephalic        Eyes:  pupils equal and round        Lymph:      ENT:  no pallor or cyanosis        Neck:  carotid pulses are full and equal bilaterally        Respiratory:  clear to auscultation;normal symmetry         Cardiac: regular rhythm;no murmurs, gallops or rubs detected                pulses full and equal                                        GI:  abdomen soft;no bruits        Extremities and Muscular Skeletal:  no deformities, clubbing, cyanosis, erythema observed              Neurological:  no gross motor deficits        Psych:  Alert and Oriented x 3          CC  Soco Durham MD  [facility] [place] [address] [place] [address]                    Thank you for allowing me to participate in the care of your patient.      Sincerely,     Laurence Mcintyre,      [facility]    cc:   Soco Durham MD  [address] [place] [address]        "

## 2019-01-21 NOTE — PROGRESS NOTES
HPI and Plan:   See dictation    No orders of the defined types were placed in this encounter.      Orders Placed This Encounter   Medications     simvastatin (ZOCOR) 20 MG tablet     Sig: Take 0.5 tablets (10 mg) by mouth At Bedtime     Dispense:  90 tablet     Refill:  3       Medications Discontinued During This Encounter   Medication Reason     simvastatin (ZOCOR) 20 MG tablet Reorder         Encounter Diagnosis   Name Primary?     Hypercholesteremia        CURRENT MEDICATIONS:  Current Outpatient Medications   Medication Sig Dispense Refill     amLODIPine (NORVASC) 5 MG tablet Take 1 tablet (5 mg) by mouth daily 90 tablet 0     aspirin 81 MG tablet Take 81 mg by mouth daily  30 tablet      Cholecalciferol (VITAMIN D) 2000 UNITS tablet Take 2,000 Units by mouth daily 100 tablet 3     Coenzyme Q10 (CO Q 10) 100 MG CAPS Take 1 capsule by mouth daily        fish oil-omega-3 fatty acids (OMEGA 3) 1000 MG capsule Take 1 g by mouth 2 times daily  90 capsule      LORazepam (ATIVAN) 0.5 MG tablet Take 0.5 tablets (0.25 mg) by mouth daily 45 tablet 1     metoprolol tartrate (LOPRESSOR) 25 MG tablet Take 0.5 tablets (12.5 mg) by mouth 2 times daily 90 tablet 3     multivitamin, therapeutic with minerals (MULTI-VITAMIN) TABS Take 1 tablet by mouth daily 100 tablet 3     Probiotic Product (PROBIOTIC PO) Take by mouth daily       Resveratrol 250 MG CAPS Take 500 mg by mouth daily       simvastatin (ZOCOR) 20 MG tablet Take 0.5 tablets (10 mg) by mouth At Bedtime 90 tablet 3       ALLERGIES   No Known Allergies    PAST MEDICAL HISTORY:  Past Medical History:   Diagnosis Date     Cough 4/15/2014     Dysthymia 4/15/2014     Essential hypertension, benign 4/15/2014     Hypercholesteremia 4/15/2014     Paroxysmal supraventricular tachycardia (H) 4/15/2014       PAST SURGICAL HISTORY:  Past Surgical History:   Procedure Laterality Date     ABDOMEN SURGERY  1972    Tubal ligation     APPENDECTOMY  Unsure    Removed when had vaginal  hysterectomy.     COLONOSCOPY N/A 3/23/2017    Procedure: COMBINED COLONOSCOPY, SINGLE OR MULTIPLE BIOPSY/POLYPECTOMY BY BIOPSY;  Surgeon: Devante Sanchez MD;  Location: [facility]     HYSTERECTOMY, PAP NO LONGER INDICATED         FAMILY HISTORY:  Family History   Problem Relation Age of Onset     Diabetes Father 60         66 yo     Asthma Father         Mild     Heart Disease Mother          66 yo      Hypertension Mother         , heart attack, age 67     Hyperlipidemia Mother      Heart Disease Brother          75 yo     Heart Disease Brother          78 yo     Heart Disease Brother          78 yo      Heart Disease Brother 67        Triple bypass     Family History Negative Sister          62 yo MVA      Family History Negative Sister      Family History Negative Daughter      Family History Negative Daughter      Diabetes Sister      Hypertension Sister         , car accident, age 61     Hyperlipidemia Sister      Diabetes Brother      Hypertension Brother         , car accident, age 61     Hyperlipidemia Brother      Hypertension Sister      Hyperlipidemia Sister      Hypertension Brother          following heart surgery, age 77     Hyperlipidemia Brother      Hypertension Brother      Hyperlipidemia Brother      Hypertension Brother         ,stroke after heart surgery     Hyperlipidemia Brother      Cerebrovascular Disease Brother         After heart surgery, from stroke, age 77     Other Cancer Daughter         Brain tumor, ; surgery.       SOCIAL HISTORY:  Social History     Socioeconomic History     Marital status:      Spouse name: None     Number of children: None     Years of education: None     Highest education level: None   Social Needs     Financial resource strain: None     Food insecurity - worry: None     Food insecurity - inability: None     Transportation needs - medical: None      "Transportation needs - non-medical: None   Occupational History     Employer: RETIRED   Tobacco Use     Smoking status: Former Smoker     Years: 5.00     Types: Cigarettes     Start date: 1963     Last attempt to quit: 1968     Years since quittin.0     Smokeless tobacco: Never Used     Tobacco comment: Smoked [age] yo.   Substance and Sexual Activity     Alcohol use: Yes     Alcohol/week: 0.0 oz     Comment: 1-2 glasses of wine or beer a week.     Drug use: No     Sexual activity: Yes     Partners: Male   Other Topics Concern      Service Not Asked     Blood Transfusions Not Asked     Caffeine Concern No     Comment: not caffeine      Occupational Exposure Not Asked     Hobby Hazards Not Asked     Sleep Concern Not Asked     Stress Concern Not Asked     Weight Concern Not Asked     Special Diet No     Comment: regular diet      Back Care Not Asked     Exercise Yes     Comment: 3 times a week at gym      Bike Helmet Not Asked     Seat Belt Not Asked     Self-Exams Not Asked     Parent/sibling w/ CABG, MI or angioplasty before 65F 55M? Yes     Comment: Mother; heart attack age 53. brother Renato age 53 angioplast   Social History Narrative     None       Review of Systems:  Skin:  Negative       Eyes:  Positive for glasses    ENT:  Positive for hearing loss    Respiratory:  Negative       Cardiovascular:  Negative for      Gastroenterology: Negative      Genitourinary:  Negative      Musculoskeletal:  Negative      Neurologic:  Negative      Psychiatric:  Positive for sleep disturbances occ trouble sleeping  Heme/Lymph/Imm:  Negative      Endocrine:  Negative        Physical Exam:  Vitals: /68 (BP Location: Right arm, Patient Position: Sitting, Cuff Size: Adult Regular)   Pulse 64   Ht 1.664 m (5' 5.5\")   Wt 66.2 kg (145 lb 14.4 oz)   Breastfeeding? No   BMI 23.91 kg/m      Constitutional:  cooperative, alert and oriented, well developed, well nourished, in no acute distress    "     Skin:  warm and dry to the touch          Head:  normocephalic        Eyes:  pupils equal and round        Lymph:      ENT:  no pallor or cyanosis        Neck:  carotid pulses are full and equal bilaterally        Respiratory:  clear to auscultation;normal symmetry         Cardiac: regular rhythm;no murmurs, gallops or rubs detected                pulses full and equal                                        GI:  abdomen soft;no bruits        Extremities and Muscular Skeletal:  no deformities, clubbing, cyanosis, erythema observed              Neurological:  no gross motor deficits        Psych:  Alert and Oriented x 3          CC  Soco Durham MD  [address] [facility] [address]  [place] [address]

## 2019-01-21 NOTE — PROGRESS NOTES
Service Date: 01/21/2019      HISTORY OF PRESENT ILLNESS:  Ms. Lopez is a very pleasant 73-year-old female with a history of SVT and a strong family history of coronary artery disease.  She is here for a followup visit.  We have her on low-dose metoprolol for SVT symptoms.  She has had very infrequent symptoms over the past year.  She was concerned about her risks for heart disease.  Last year we did perform a calcium scoring to better risk stratify her.  Her total calcium scoring was 68 with a predominance in the left main, still putting her at low risk given her age for cardiovascular events.        Her risk factors have been under optimal control.  She is on simvastatin for hyperlipidemia.  Her last cholesterol numbers from 12/13 showed total cholesterol 172, HDL 52, LDL 94 and triglycerides 131.      PHYSICAL EXAMINATION:   VITAL SIGNS:  Blood pressure is under good control at 128/68, pulse is 64, weight is 145 with a body mass index of 23.   NECK:  Carotid upstrokes seem brisk without bruit.   CARDIOVASCULAR:  Tones are regular without murmur, gallop, rub, or ectopy.   LUNGS:  Clear posteriorly.   EXTREMITIES:  She has no peripheral edema.      SUMMARY:  Ms. Lopez is a very pleasant 73-year-old female with SVT and a family history of coronary artery disease.  She is doing well from a cardiac perspective, asymptomatic and with infrequent episodes of SVT.  Her risk factors are under optimal control.  I did refill her simvastatin for her as it was running low.  I am happy to see her back annually or refer her back to her primary care provider for continued primary prevention.  She prefers to follow up with me on an annual basis.  Please feel free to contact me with any questions you have in regards to her care.      cc:      Soco Durham MD    [facility]   [address]   [place] [address]         JULIUS YOON DO             D: 01/21/2019   T: 01/21/2019   MT: SHAVON      Name:      NISHANT AYALA   MRN:      [id number]        Account:      [id number]   :      1945           Service Date: 2019      Document: [id number]

## 2019-01-22 NOTE — PROGRESS NOTES
AUDIOLOGY REPORT    SUBJECTIVE: Lisa Lopez is a 73 year old female who was seen 1/21/2019 in the Audiology Clinic at the [facility] for a fitting of binaural Signis Pure Charge and Go 3Nx TOYA and . Lisa has been seen previously on 10/29/2018, and results revealed a normal sloping to severe high frequency sensorineural hearing loss bilaterally.    OBJECTIVE: The hearing aid conformity evaluation was completed.The hearing aids were placed and they provided a good fit. Real-ear-probe-microphone measurements were completed on the [employer] system and were a good match to NAL-NL1 target with soft sounds audible, moderate sounds comfortable, and loud sounds below discomfort. UCLs are verified through maximum power output measures and demonstrate appropriate limiting of loud inputs. Lisa was oriented to proper hearing aid use, care, cleaning (no water, dry brush), batteries (rechargeable, insertion/removal in  low-battery signal), aid insertion/removal, user booklet, warranty information, storage cases, and other hearing aid details. The patient confirmed understanding of hearing aid use and care, and showed proper insertion of hearing aid and batteries while in the office today.Lisa reported good volume and sound quality today.   Hearing aids were programmed as follows:  Program 1:Universal  Program 2: Narrow Directional    EARS FIT: Bilateral  HEARING AID MODEL NAME: Signia Pure Charge and Go 3Nx TOYA and .  HEARING AID STYLE: -in-the-ear behind-the-ear  EARMOLDS/TIP: Semiopen  SERIAL NUMBERS: Right: [id number] Left: [id number]  WARRANTY END DATE: 12/19/2021    ASSESSMENT: Binaural Signis Pure Charge and Go 3Nx TOYA and . hearing aids were fit today. Verification measures were performed. Lisa signed the Hearing Aid Purchase Agreement and was given a copy, as well as details on her hearing aids. Patient private paid, with a down payment of $1200  today.  PLAN:Lisa will return for follow-up in 2-3 weeks for a hearing aid review appointment. Please call this clinic with questions regarding today s appointment.      Bill Concepcion, CCC-A  Licensed Audiologist  [place] #[id number]

## 2019-01-24 ENCOUNTER — TRANSFERRED RECORDS (OUTPATIENT)
Dept: HEALTH INFORMATION MANAGEMENT | Facility: CLINIC | Age: 74
End: 2019-01-24

## 2019-02-11 ENCOUNTER — OFFICE VISIT (OUTPATIENT)
Dept: AUDIOLOGY | Facility: CLINIC | Age: 74
End: 2019-02-11
Payer: MEDICARE

## 2019-02-11 DIAGNOSIS — H90.3 SENSORY HEARING LOSS, BILATERAL: Primary | ICD-10-CM

## 2019-02-11 NOTE — PROGRESS NOTES
AUDIOLOGY REPORT    SUBJECTIVE:Lisa Lopez is a 73 year old female who was seen 2/11/2019 in the Audiology Clinic at the [facility] on 2/11/2019  for a follow-up check regarding the fitting of new hearing aids. Lisa has been seen previously on 10/29/2018, and results revealed a normal sloping to severe high frequency sensorineural hearing loss bilaterally. The patient has been seen previously in this clinic and was fit with binaural Signia Pure Charge and Go 3NX on 1/21/2019.  Lisa reports good sound quality with the hearing aids, with the only problem being hearing TV at a friends.    OBJECTIVE:   The International Outcome Inventory-Hearing Aids (IOI-HA) was administered today.The patient s responses to the 7 questions can be compared to normative data relative to how others are performing with their hearing aids, as well as focusing audiologic care and counseling.This patient s Quality of Life score (Question 7) was 5, which is above normative average.     Based on patient report, the following changes were made:  She was given a slight increase in gain, and give a TV program with additional gain.    Reviewed 45 day trial period, care, cleaning (no water, dry brush), batteries (size RC) insertion/removal, toxicity, low-battery signal), aid insertion/removal, volume adjustment (if applicable), user booklet, warranty information, storage cases, and other hearing aid details.     Hearing aids were checked electroacoustically and working fine     ASSESSMENT: A follow-up appointment for hearing aid fitting was completed today. Changes to hearing aid was completed as outlined above.   This was a no charge warranty visit.    PLAN:Lisa will return for follow-up as needed, or at least every 4-6 months for cleaning and assessment of hearing aid.   Please call this clinic with any questions regarding today s appointment.      Bill Concepcion, SIS-A  Licensed  Audiologist  [place] #[id number]

## 2019-02-26 DIAGNOSIS — I10 ESSENTIAL HYPERTENSION, BENIGN: ICD-10-CM

## 2019-02-26 NOTE — TELEPHONE ENCOUNTER
"Requested Prescriptions   Pending Prescriptions Disp Refills     amLODIPine (NORVASC) 5 MG tablet  Last Written Prescription Date:  11/29/18  Last Fill Quantity: 90,  # refills: 0   Last office visit: 12/13/2018 with prescribing provider:  Dr. Durham   Future Office Visit:   90 tablet 0     Sig: Take 1 tablet (5 mg) by mouth daily    Calcium Channel Blockers Protocol  Passed - 2/26/2019  2:50 PM       Passed - Blood pressure under 140/90 in past 12 months    BP Readings from Last 3 Encounters:   01/21/19 128/68   12/13/18 130/70   01/19/18 128/66                Passed - Recent (12 mo) or future (30 days) visit within the authorizing provider's specialty    Patient had office visit in the last 12 months or has a visit in the next 30 days with authorizing provider or within the authorizing provider's specialty.  See \"Patient Info\" tab in inbasket, or \"Choose Columns\" in Meds & Orders section of the refill encounter.             Passed - Medication is active on med list       Passed - Patient is age 18 or older       Passed - No active pregnancy on record       Passed - Normal serum creatinine on file in past 12 months    Recent Labs   Lab Test 12/13/18  0806   CR 0.82            Passed - No positive pregnancy test in past 12 months        "

## 2019-02-27 ENCOUNTER — MYC REFILL (OUTPATIENT)
Dept: INTERNAL MEDICINE | Facility: CLINIC | Age: 74
End: 2019-02-27

## 2019-02-27 DIAGNOSIS — I10 ESSENTIAL HYPERTENSION, BENIGN: ICD-10-CM

## 2019-02-27 DIAGNOSIS — F34.1 DYSTHYMIA: ICD-10-CM

## 2019-02-27 RX ORDER — LORAZEPAM 0.5 MG/1
0.25 TABLET ORAL DAILY
Qty: 45 TABLET | Refills: 1 | Status: SHIPPED | OUTPATIENT
Start: 2019-02-27 | End: 2019-08-26

## 2019-02-27 RX ORDER — AMLODIPINE BESYLATE 5 MG/1
5 TABLET ORAL DAILY
Qty: 90 TABLET | Refills: 0 | Status: SHIPPED | OUTPATIENT
Start: 2019-02-27 | End: 2019-12-31

## 2019-02-27 NOTE — TELEPHONE ENCOUNTER
Prescription approved per [facility] Refill Protocol.    Latisha Cates RN   [facility]

## 2019-02-27 NOTE — TELEPHONE ENCOUNTER
Controlled Substance Refill Request for ativan  Problem List Complete:  No     PROVIDER TO CONSIDER COMPLETION OF PROBLEM LIST AND OVERVIEW/CONTROLLED SUBSTANCE AGREEMENT    Last Written Prescription Date:  9/5/18  Last Fill Quantity: 45,   # refills: 1    Last Office Visit with [facility] primary care provider: 12/13/18    Future Office visit:     Controlled substance agreement:   Encounter-Level CSA:    There are no encounter-level csa.     Patient-Level CSA:    There are no patient-level csa.         Last Urine Drug Screen: No results found for: CDAUT, No results found for: COMDAT, No results found for: THC13, PCP13, COC13, MAMP13, OPI13, AMP13, BZO13, TCA13, MTD13, BAR13, OXY13, PPX13, BUP13     [facility]  [facility]     [email or web address]       checked in past 3 months?  No, route to RN

## 2019-02-27 NOTE — TELEPHONE ENCOUNTER
[place] reviewed: Access not granted by provider.    [place]  [email or web address]

## 2019-03-01 RX ORDER — AMLODIPINE BESYLATE 5 MG/1
5 TABLET ORAL DAILY
Qty: 90 TABLET | Refills: 1 | Status: SHIPPED | OUTPATIENT
Start: 2019-03-01 | End: 2019-11-22

## 2019-03-01 NOTE — TELEPHONE ENCOUNTER
Prescription approved per [facility] Refill Protocol.    Latisha Cates RN   [facility]

## 2019-06-17 ENCOUNTER — OFFICE VISIT (OUTPATIENT)
Dept: AUDIOLOGY | Facility: CLINIC | Age: 74
End: 2019-06-17
Payer: COMMERCIAL

## 2019-06-17 DIAGNOSIS — H90.3 SENSORY HEARING LOSS, BILATERAL: Primary | ICD-10-CM

## 2019-06-17 NOTE — PROGRESS NOTES
AUDIOLOGY REPORT    SUBJECTIVE:Lisa Lopez is a 73 year old female who was seen 6/17/2019 in the Audiology Clinic at the [facility] for a follow-up check of her hearing aids. Lisa has been seen previously on 10/29/2018, and results revealed a normal sloping to severe high frequency sensorineural hearing loss bilaterally. The patient has been seen previously in this clinic and was fit with binaural Signia Pure Charge and Go 3NX on 1/21/2019.  Lisa reports good sound quality with the hearing aids, with the only problem was her frankie is gone    OBJECTIVE:   Based on patient report, the following changes were made:  She was given a 4 dB increase in gain from 1-3500 Hz, and she felt it sounded more clear.  She was told to eliminate the frankie and restart.  She may need to forget and repair the hearing aids.  Hearing aids were checked electroacoustically and working fine.  They were clear of wax     ASSESSMENT: A follow-up appointment for hearing aid fitting was completed today. Changes to hearing aid was completed as outlined above.   This was a no charge warranty visit.    PLAN:Lisa will return for follow-up as needed, or at least every 4-6 months for cleaning and assessment of hearing aid.   Please call this clinic with any questions regarding today s appointment.      Bill Concepcion, CCC-A  Licensed Audiologist  [place] #[id number]

## 2019-08-26 ENCOUNTER — MYC REFILL (OUTPATIENT)
Dept: INTERNAL MEDICINE | Facility: CLINIC | Age: 74
End: 2019-08-26

## 2019-08-26 DIAGNOSIS — F34.1 DYSTHYMIA: ICD-10-CM

## 2019-08-28 ENCOUNTER — MYC REFILL (OUTPATIENT)
Dept: INTERNAL MEDICINE | Facility: CLINIC | Age: 74
End: 2019-08-28

## 2019-08-28 DIAGNOSIS — F34.1 DYSTHYMIA: ICD-10-CM

## 2019-08-28 RX ORDER — LORAZEPAM 0.5 MG/1
0.25 TABLET ORAL DAILY
Qty: 45 TABLET | Refills: 1 | Status: SHIPPED | OUTPATIENT
Start: 2019-08-28 | End: 2019-12-31

## 2019-08-28 NOTE — TELEPHONE ENCOUNTER
Ativan      Last Written Prescription Date:  2/27/19  Last Fill Quantity: 45,   # refills: 1  Last Office Visit: 12/13/18  Future Office visit:       Routing refill request to provider for review/approval because:  Drug not on the [facility], [facility] or [facility] refill protocol or controlled substance

## 2019-08-29 RX ORDER — LORAZEPAM 0.5 MG/1
0.25 TABLET ORAL DAILY
Qty: 45 TABLET | Refills: 1 | OUTPATIENT
Start: 2019-08-29

## 2019-09-13 ENCOUNTER — TELEPHONE (OUTPATIENT)
Dept: INTERNAL MEDICINE | Facility: CLINIC | Age: 74
End: 2019-09-13

## 2019-09-13 NOTE — TELEPHONE ENCOUNTER
Reason for call:  Patient reporting a symptom    Symptom or request: heartburn    Duration (how long have symptoms been present): 1 day    Have you been treated for this before? unknown    Additional comments: patient reports severe heartburn today after eating some bbq food. She feels it is better now. She has not been able to eat of drink since and did vomit. Please call her back to advise.    Phone Number patient can be reached at:  Home number on file [phone] (home)    Best Time:  any    Can we leave a detailed message on this number:  YES    Call taken on 9/13/2019 at 3:09 PM by Marie Dominguez

## 2019-09-13 NOTE — TELEPHONE ENCOUNTER
Call to patient. States she feels a little shaky now. States she has a history of acid reflux where she can not swallow and can not belch. States this episode lasted for a bout 4 hours and the only way she could relieve it was to throw up. Patient threw up three times and the pain did not resolve completely until after the third time. The first two times she threw up the pain lessened but the burning continued. States she feels better now. Patient does not take medications for acid reflux. Has taken Zantac for heart burn in the past as needed. Patient states she has had similar symptoms in the past but never this severe. Offered appointment but patient would prefer message be sent to primary care provider.

## 2019-09-16 ENCOUNTER — OFFICE VISIT (OUTPATIENT)
Dept: INTERNAL MEDICINE | Facility: CLINIC | Age: 74
End: 2019-09-16
Payer: COMMERCIAL

## 2019-09-16 VITALS
DIASTOLIC BLOOD PRESSURE: 71 MMHG | BODY MASS INDEX: 22.8 KG/M2 | TEMPERATURE: 97.6 F | RESPIRATION RATE: 18 BRPM | OXYGEN SATURATION: 99 % | HEIGHT: 66 IN | HEART RATE: 72 BPM | WEIGHT: 141.9 LBS | SYSTOLIC BLOOD PRESSURE: 153 MMHG

## 2019-09-16 DIAGNOSIS — R07.9 CHEST PAIN, UNSPECIFIED TYPE: ICD-10-CM

## 2019-09-16 DIAGNOSIS — R13.19 ESOPHAGEAL DYSPHAGIA: Primary | ICD-10-CM

## 2019-09-16 DIAGNOSIS — R10.13 EPIGASTRIC PAIN: ICD-10-CM

## 2019-09-16 LAB
ERYTHROCYTE [DISTWIDTH] IN BLOOD BY AUTOMATED COUNT: 13 % (ref 10–15)
HCT VFR BLD AUTO: 41.6 % (ref 35–47)
HGB BLD-MCNC: 13.7 G/DL (ref 11.7–15.7)
MCH RBC QN AUTO: 30.9 PG (ref 26.5–33)
MCHC RBC AUTO-ENTMCNC: 32.9 G/DL (ref 31.5–36.5)
MCV RBC AUTO: 94 FL (ref 78–100)
PLATELET # BLD AUTO: 358 10E9/L (ref 150–450)
RBC # BLD AUTO: 4.43 10E12/L (ref 3.8–5.2)
WBC # BLD AUTO: 8.5 10E9/L (ref 4–11)

## 2019-09-16 PROCEDURE — 83690 ASSAY OF LIPASE: CPT | Performed by: INTERNAL MEDICINE

## 2019-09-16 PROCEDURE — 85027 COMPLETE CBC AUTOMATED: CPT | Performed by: INTERNAL MEDICINE

## 2019-09-16 PROCEDURE — 93000 ELECTROCARDIOGRAM COMPLETE: CPT | Performed by: INTERNAL MEDICINE

## 2019-09-16 PROCEDURE — 36415 COLL VENOUS BLD VENIPUNCTURE: CPT | Performed by: INTERNAL MEDICINE

## 2019-09-16 PROCEDURE — 99214 OFFICE O/P EST MOD 30 MIN: CPT | Performed by: INTERNAL MEDICINE

## 2019-09-16 PROCEDURE — 80053 COMPREHEN METABOLIC PANEL: CPT | Performed by: INTERNAL MEDICINE

## 2019-09-16 RX ORDER — OMEPRAZOLE 20 MG/1
20 TABLET, DELAYED RELEASE ORAL DAILY
Qty: 60 TABLET | Refills: 1 | Status: SHIPPED | OUTPATIENT
Start: 2019-09-16 | End: 2019-12-31

## 2019-09-16 ASSESSMENT — PATIENT HEALTH QUESTIONNAIRE - PHQ9
10. IF YOU CHECKED OFF ANY PROBLEMS, HOW DIFFICULT HAVE THESE PROBLEMS MADE IT FOR YOU TO DO YOUR WORK, TAKE CARE OF THINGS AT HOME, OR GET ALONG WITH OTHER PEOPLE: NOT DIFFICULT AT ALL
SUM OF ALL RESPONSES TO PHQ QUESTIONS 1-9: 3
SUM OF ALL RESPONSES TO PHQ QUESTIONS 1-9: 3

## 2019-09-16 ASSESSMENT — MIFFLIN-ST. JEOR: SCORE: 1142.46

## 2019-09-16 NOTE — NURSING NOTE
"BP (!) 153/71 (BP Location: Right arm, Patient Position: Sitting, Cuff Size: Adult Regular)   Pulse 72   Temp 97.6  F (36.4  C) (Oral)   Resp 18   Ht 1.664 m (5' 5.5\")   Wt 64.4 kg (141 lb 14.4 oz)   SpO2 99%   BMI 23.25 kg/m    Patient is here for acid reflux. Patient states she could not swallow.  "

## 2019-09-16 NOTE — PROGRESS NOTES
"Subjective     Lisa Lopez is a 74 year old female who presents to clinic today for the following health issues:  Patient here for heartburn.  HPI   HPI:   3 days ago she ate 3-4 bites of barbequed meat (that she says where not big bites) and had sudden onset of 8-9/10 pain in her mid chest. She felt like she could not eat or drink anything. For the next 4-6 hrs she repeatedly retched bringing up saliva and gastric juices but no meat. Her retching was part nausea part she thought she would feel better if she could throw up.  She was salivating excessively during this time. Gradually the pain resolved. She now is afraid to eat.     She has been very stressed the last 2 weeks. She has a history of of pancreatitis about 15 years ago, cause unknown. She still has her gallbladder. She has never been on a PPI or had significant GERD. She has had no problems prior with dysphagia of food or fluid. She has a history of SVT that has not been giving her problems recently. No other heart history.     BP Readings from Last 3 Encounters:   09/16/19 (!) 153/71   01/21/19 128/68   12/13/18 130/70    Wt Readings from Last 3 Encounters:   09/16/19 64.4 kg (141 lb 14.4 oz)   01/21/19 66.2 kg (145 lb 14.4 oz)   12/13/18 66.4 kg (146 lb 4.8 oz)                 Reviewed and updated as needed this visit by Provider         Review of Systems   ROS COMP: Constitutional, HEENT, cardiovascular, pulmonary, GI, , musculoskeletal, neuro, skin, endocrine and psych systems are negative, except as otherwise noted.      Objective    BP (!) 153/71 (BP Location: Right arm, Patient Position: Sitting, Cuff Size: Adult Regular)   Pulse 72   Temp 97.6  F (36.4  C) (Oral)   Resp 18   Ht 1.664 m (5' 5.5\")   Wt 64.4 kg (141 lb 14.4 oz)   SpO2 99%   BMI 23.25 kg/m    Body mass index is 23.25 kg/m .  Physical Exam   GENERAL: healthy, alert and no distress  EYES: Eyes grossly normal to inspection, PERRL and conjunctivae and sclerae normal  NECK: " no adenopathy, no asymmetry, masses, or scars and thyroid normal to palpation  RESP: lungs clear to auscultation - no rales, rhonchi or wheezes  CV: regular rate and rhythm, normal S1 S2, no S3 or S4, no murmur, click or rub, no peripheral edema and peripheral pulses strong  ABDOMEN: soft, nontender, no hepatosplenomegaly, no masses and bowel sounds normal  MS: no gross musculoskeletal defects noted, no edema  NEURO: Normal strength and tone, mentation intact and speech normal  PSYCH: mentation appears normal, affect normal/bright          Assessment & Plan     1. Esophageal dysphagia  Presenting for the first time in very dramatic form. esophageal spasm most likely but other cuases possible, Will check labs listed below, and upper endoscopy   - GASTROENTEROLOGY ADULT REF PROCEDURE ONLY [facility] [phone]; [facility]  - CBC with platelets    2. Chest pain, unspecified type  Again most likely esophageal issue but Will check EKG  - EKG 12-lead complete w/read - Clinics    3. Epigastric pain     - Comprehensive metabolic panel  - US Abdomen Complete; Future  - Lipase  - CBC with platelets  - omeprazole (PRILOSEC OTC) 20 MG EC tablet; Take 1 tablet (20 mg) by mouth daily  Dispense: 60 tablet; Refill: 1       Return in about 4 weeks (around 10/14/2019).    Soco Durham MD  [facility]        Answers for HPI/ROS submitted by the patient on 9/16/2019   If you checked off any problems, how difficult have these problems made it for you to do your work, take care of things at home, or get along with other people?: Not difficult at all  PHQ9 TOTAL SCORE: 3

## 2019-09-16 NOTE — TELEPHONE ENCOUNTER
Come in at 3:40 p.m. Today per MD, use 5 pm slot on schedule.  Patient advised and will be here at 3:40 p.m.  JOSE MIGUEL Castellanos R.N.

## 2019-09-17 LAB
ALBUMIN SERPL-MCNC: 4.3 G/DL (ref 3.4–5)
ALP SERPL-CCNC: 71 U/L (ref 40–150)
ALT SERPL W P-5'-P-CCNC: 32 U/L (ref 0–50)
ANION GAP SERPL CALCULATED.3IONS-SCNC: 5 MMOL/L (ref 3–14)
AST SERPL W P-5'-P-CCNC: 26 U/L (ref 0–45)
BILIRUB SERPL-MCNC: 0.5 MG/DL (ref 0.2–1.3)
BUN SERPL-MCNC: 17 MG/DL (ref 7–30)
CALCIUM SERPL-MCNC: 9.8 MG/DL (ref 8.5–10.1)
CHLORIDE SERPL-SCNC: 106 MMOL/L (ref 94–109)
CO2 SERPL-SCNC: 28 MMOL/L (ref 20–32)
CREAT SERPL-MCNC: 0.8 MG/DL (ref 0.52–1.04)
GFR SERPL CREATININE-BSD FRML MDRD: 71 ML/MIN/{1.73_M2}
GLUCOSE SERPL-MCNC: 87 MG/DL (ref 70–99)
LIPASE SERPL-CCNC: 485 U/L (ref 73–393)
POTASSIUM SERPL-SCNC: 4.2 MMOL/L (ref 3.4–5.3)
PROT SERPL-MCNC: 8.1 G/DL (ref 6.8–8.8)
SODIUM SERPL-SCNC: 139 MMOL/L (ref 133–144)

## 2019-09-17 ASSESSMENT — PATIENT HEALTH QUESTIONNAIRE - PHQ9: SUM OF ALL RESPONSES TO PHQ QUESTIONS 1-9: 3

## 2019-10-03 ENCOUNTER — HOSPITAL ENCOUNTER (OUTPATIENT)
Dept: ULTRASOUND IMAGING | Facility: CLINIC | Age: 74
Discharge: HOME | End: 2019-10-03
Attending: INTERNAL MEDICINE | Admitting: INTERNAL MEDICINE
Payer: COMMERCIAL

## 2019-10-03 DIAGNOSIS — R10.13 EPIGASTRIC PAIN: ICD-10-CM

## 2019-10-03 PROCEDURE — 76700 US EXAM ABDOM COMPLETE: CPT

## 2019-10-16 ENCOUNTER — HOSPITAL ENCOUNTER (OUTPATIENT)
Facility: CLINIC | Age: 74
Discharge: HOME | End: 2019-10-16
Attending: INTERNAL MEDICINE | Admitting: INTERNAL MEDICINE
Payer: COMMERCIAL

## 2019-10-16 VITALS
RESPIRATION RATE: 16 BRPM | HEART RATE: 51 BPM | DIASTOLIC BLOOD PRESSURE: 70 MMHG | SYSTOLIC BLOOD PRESSURE: 134 MMHG | OXYGEN SATURATION: 95 %

## 2019-10-16 LAB — UPPER GI ENDOSCOPY: NORMAL

## 2019-10-16 PROCEDURE — 40000104 ZZH STATISTIC MODERATE SEDATION < 10 MIN: Performed by: INTERNAL MEDICINE

## 2019-10-16 PROCEDURE — 25000125 ZZHC RX 250: Performed by: INTERNAL MEDICINE

## 2019-10-16 PROCEDURE — 25000128 H RX IP 250 OP 636: Performed by: INTERNAL MEDICINE

## 2019-10-16 PROCEDURE — 43248 EGD GUIDE WIRE INSERTION: CPT | Performed by: INTERNAL MEDICINE

## 2019-10-16 RX ORDER — ONDANSETRON 2 MG/ML
4 INJECTION INTRAMUSCULAR; INTRAVENOUS EVERY 6 HOURS PRN
Status: DISCONTINUED | OUTPATIENT
Start: 2019-10-16 | End: 2019-10-16 | Stop reason: HOSPADM

## 2019-10-16 RX ORDER — NALOXONE HYDROCHLORIDE 0.4 MG/ML
.1-.4 INJECTION, SOLUTION INTRAMUSCULAR; INTRAVENOUS; SUBCUTANEOUS
Status: DISCONTINUED | OUTPATIENT
Start: 2019-10-16 | End: 2019-10-16 | Stop reason: HOSPADM

## 2019-10-16 RX ORDER — ONDANSETRON 4 MG/1
4 TABLET, ORALLY DISINTEGRATING ORAL EVERY 6 HOURS PRN
Status: DISCONTINUED | OUTPATIENT
Start: 2019-10-16 | End: 2019-10-16 | Stop reason: HOSPADM

## 2019-10-16 RX ORDER — FLUMAZENIL 0.1 MG/ML
0.2 INJECTION, SOLUTION INTRAVENOUS
Status: DISCONTINUED | OUTPATIENT
Start: 2019-10-16 | End: 2019-10-16 | Stop reason: HOSPADM

## 2019-10-16 RX ORDER — ONDANSETRON 2 MG/ML
4 INJECTION INTRAMUSCULAR; INTRAVENOUS
Status: DISCONTINUED | OUTPATIENT
Start: 2019-10-16 | End: 2019-10-16 | Stop reason: HOSPADM

## 2019-10-16 RX ORDER — LIDOCAINE 40 MG/G
CREAM TOPICAL
Status: DISCONTINUED | OUTPATIENT
Start: 2019-10-16 | End: 2019-10-16 | Stop reason: HOSPADM

## 2019-10-16 RX ORDER — FENTANYL CITRATE 50 UG/ML
INJECTION, SOLUTION INTRAMUSCULAR; INTRAVENOUS PRN
Status: DISCONTINUED | OUTPATIENT
Start: 2019-10-16 | End: 2019-10-16 | Stop reason: HOSPADM

## 2019-10-16 NOTE — LETTER
September 26, 2019      Lisa Lopez  [address]  [place] [address]        Dear Lisa,     Thank you for choosing [facility]. You are scheduled for the following service(s).   Please be aware that coverage of these services is subject to the terms and limitations of your health insurance plan.  Call member services at your health plan with any benefit or coverage questions.    Date:   10/16/19     Procedure: UPPER ENDOSCOPY-EGD  Doctor: DR Edi PERRIN           Arrival Time:  11 AM    *check in at Emergency/Endoscopy desk*  Procedure Time: 11 30 AM       Location:   [facility]        Endoscopy Department, First Floor (Enter through ER Doors) *         [address] [place], [place] [address] [phone] or [phone] () to reschedule          PRE-PROCEDURE CHECKLIST    If you have diabetes, ask your regular doctor for diet and medication restrictions.  If you take any antiplatelet or anticoagulant medications (such as Coumadin, Lovenox, Plavix, etc.) and have not already discussed this, please call your primary physician for advice on holding this medication.  If you take Aspirin, you may continue to do so.  If you are or may be pregnant, please discuss the risks and benefits of this procedure with your doctor.  You must arrange for a ride for the day of your exam. If you fail to arrange transportation with a responsible adult, your procedure will need to be cancelled and rescheduled. Taxi, bus and medical transport are not acceptable unless you have a responsible adult that you know & trust with you. Please arrange for this  to be able to pick you up in our department, approximately one hour after your scheduled procedure, if they are not able to stay with you.      Canceling or rescheduling   If you must cancel or reschedule your appointment, please call [phone] as soon as possible.      Upper Endoscopy or  Esophagogastroduodenoscopy (EGD) is a test performed to evaluate symptoms of persistent abdominal pain, nausea, vomiting and difficulty swallowing. It may also be used to treat various conditions of the upper gastrointestinal tract, such as bleeding, narrowing or abnormal growths.     What happens during an upper endoscopy?  On the day of your procedure, plan to spend up to one and a half hour after your arrival at the endoscopy center. The exam itself takes about 5 to 10 minutes.    Before the exam:  - You will change into a gown.   - Your medical history and medication list will be reviewed with you, unless it has already been done over the phone.   - A nurse will insert an intravenous (IV) line into your hand or arm.  - The doctor will talk to you and give you a consent form to sign.    During the exam:  - Medicine will be given through the IV line to help you relax and feel comfortable.   - Your heart rate and oxygen levels will be monitored. If your blood pressure is low, you may be given fluids through the IV line.   - The doctor will insert a flexible, hollow tube, called an endoscope, into your mouth and will advance it slowly through the esophagus, stomach and duodenum (the first part of your small intestine).   - You may have a feeling of pressure or fullness.   - If you have difficulty swallowing, and the doctor finds a narrowing in your esophagus, it may be possible for the area to be expanded-dilated during the exam.   - If abnormal tissue is found, the doctor may remove it through the endoscope (biopsy it) for closer examination. The tissue removal is painless.    After the exam:  - Any tissue samples removed during the exam will be sent to a lab for evaluation. It may take 5 to 7 working days for you to be notified of the results  - The doctor will prepare a full report for the physician who referred you for the upper endoscopy.   - The doctor will talk with you about the initial results of your exam.    - You may feel bloated after the procedure. That is normal and should not last long.   - Your throat may feel sore for a short time.   - Following the exam, you may resume your normal diet. Avoid alcohol until the next day.   - You may resume your regular activities the day after the procedure.   - Medication given during the exam will prohibit you from driving for the rest of the day.  - A nurse will provide you with complete discharge instructions before you leave the endoscopy center. Be sure to ask the nurse for specific instructions if you take blood thinners such as Aspirin , Coumadin , Lovenox , Plavix , etc.       PREPARATION    To ensure a successful exam, please follow all instructions carefully.      The night before your exam:    STOP eating solid foods at 11:45 pm.     Clear liquids are okay to drink (examples: Gatorade , apple juice, clear broth,coffee or tea without milk or cream, etc.).     DO NOT drink red liquids or alcoholic beverages.    The day of your exam:    STOP drinking clear liquids 4 hours before your exam.     You may take your usual medications with 4 oz. of water, but it needs to be at least 4 hours prior to your procedure.    When you leave for the procedure:    Bring a list of all of your current medications, including any allergy or over-the-counter medications, unless you have already reviewed that with an Endoscopy RN over the phone.     Bring a photo ID as well as up-to-date insurance information, such as your insurance card and any referral forms that might be required by your payer.         DIRECTIONS TO THE ENDOSCOPY DEPARTMENT    From the north ([facility])  Take [address]W south, exit on [address] . Get into the left hand ema, turn left (east), go one-half mile to [address] and turn left. Go north to the first stoplight, take a right on [address] and follow it to the Emergency entrance.    From the south ([facility])  Take 35N  to the 35E split and exit on [address] . On [address] , turn left (west) to [address]. Turn right (north) on [address]. Go north to the first stoplight, take a right on [address] and follow it to the Emergency entrance.    From the east via 35E ([facility])  Take [address]E south to [place] [address] exit. Turn right on [address] . Go west to [address]. Turn right (north) on [address]. Go to the first stoplight, take a right and follow on [address] to the Emergency entrance.    From the east via [address] ([facility])  Take [address] to [address]. Turn left (south) on [address] to [address]. Turn left (east) on [address] and follow it to the Emergency entrance.    From the west via [address] 13 ([place])  Take [address] east to [address]. Turn right (south) on [address] to [address]. Turn left (east) on [address] and follow it to the Emergency entrance.

## 2019-10-16 NOTE — CONSULTS
Pre-Endoscopy History and Physical     Lisa Lopez MRN# [id number]   YOB: 1945 Age: 74 year old     Date of Procedure: 10/16/2019  Primary care provider: Soco Durham  Type of Endoscopy: esophagogastroduodenoscopy (upper GI endoscopy)  Reason for Procedure: dysphagia  Type of Anesthesia Anticipated: Moderate (conscious) sedation    HPI:    Lisa is a 74 year old female who will be undergoing the above procedure.      A history and physical has been performed. The patient's medications and allergies have been reviewed. The risks and benefits of the procedure and the sedation options and risks were discussed with the patient.  All questions were answered and informed consent was obtained.      She denies a personal or family history of anesthesia complications or bleeding disorders.     No Known Allergies     Current Facility-Administered Medications   Medication     0.9% sodium chloride BOLUS     lidocaine (LMX4) kit     lidocaine 1 % 0.1-1 mL     ondansetron (ZOFRAN) injection 4 mg     sodium chloride (PF) 0.9% PF flush 3 mL     sodium chloride (PF) 0.9% PF flush 3 mL     sodium chloride (PF) 0.9% PF flush 3 mL       Patient Active Problem List   Diagnosis     Advance care planning     Paroxysmal supraventricular tachycardia (H)     Hypercholesteremia     Dysthymia     Essential hypertension, benign     Osteoporosis     Chronic pain of left knee        Past Medical History:   Diagnosis Date     Cough 4/15/2014     Dysthymia 4/15/2014     Essential hypertension, benign 4/15/2014     Hypercholesteremia 4/15/2014     Paroxysmal supraventricular tachycardia (H) 4/15/2014        Past Surgical History:   Procedure Laterality Date     ABDOMEN SURGERY  1972    Tubal ligation     APPENDECTOMY  Unsure    Removed when had vaginal hysterectomy.     COLONOSCOPY N/A 3/23/2017    Procedure: COMBINED COLONOSCOPY, SINGLE OR MULTIPLE BIOPSY/POLYPECTOMY BY BIOPSY;  Surgeon: Devante Sanchez MD;   Location: [facility]     HYSTERECTOMY, PAP NO LONGER INDICATED         Social History     Tobacco Use     Smoking status: Former Smoker     Years: 5.00     Types: Cigarettes     Start date: 1963     Last attempt to quit: 1968     Years since quittin.7     Smokeless tobacco: Never Used     Tobacco comment: Smoked [age] yo.   Substance Use Topics     Alcohol use: Yes     Alcohol/week: 0.0 standard drinks     Comment: 1-2 glasses of wine or beer a week.       Family History   Problem Relation Age of Onset     Diabetes Father 60         68 yo     Asthma Father         Mild     Heart Disease Mother          68 yo      Hypertension Mother         , heart attack, age 67     Hyperlipidemia Mother      Heart Disease Brother          75 yo     Heart Disease Brother          78 yo     Heart Disease Brother          78 yo      Heart Disease Brother 67        Triple bypass     Family History Negative Sister          60 yo MVA      Family History Negative Sister      Family History Negative Daughter      Family History Negative Daughter      Diabetes Sister      Hypertension Sister         , car accident, age 61     Hyperlipidemia Sister      Diabetes Brother      Hypertension Brother         , car accident, age 61     Hyperlipidemia Brother      Hypertension Sister      Hyperlipidemia Sister      Hypertension Brother          following heart surgery, age 77     Hyperlipidemia Brother      Hypertension Brother      Hyperlipidemia Brother      Hypertension Brother         ,stroke after heart surgery     Hyperlipidemia Brother      Cerebrovascular Disease Brother         After heart surgery, from stroke, age 77     Other Cancer Daughter         Brain tumor, ; surgery.       REVIEW OF SYSTEMS:     5 point ROS negative except as noted above in HPI, including Gen., Resp., CV, GI &  system review.    PHYSICAL EXAM:   BP (!)  "159/77   Pulse 53   Resp 21   SpO2 100%  Estimated body mass index is 23.25 kg/m  as calculated from the following:    Height as of 9/16/19: 1.664 m (5' 5.5\").    Weight as of 9/16/19: 64.4 kg (141 lb 14.4 oz).   GENERAL APPEARANCE: healthy  MENTAL STATUS: alert  AIRWAY EXAM: Mallampatti Class I (visualization of the soft palate, fauces, uvula, anterior and posterior pillars)  RESP: lungs clear to auscultation - no rales, rhonchi or wheezes  CV: regular rates and rhythm    DIAGNOSTICS:      Not indicated    IMPRESSION     ASA Class 2 - Mild systemic disease    PLAN:     EGD    The above has been forwarded to the consulting provider.    Signed Electronically by: Torey Colon MD  October 16, 2019    "

## 2019-10-16 NOTE — LETTER
September 26, 2019      Lisa Lopez  [address]  [facility] [address]        Dear Lisa,     Thank you for choosing [facility]. You are scheduled for the following service(s).   Please be aware that coverage of these services is subject to the terms and limitations of your health insurance plan.  Call member services at your health plan with any benefit or coverage questions.    Date:   11 AM      Procedure: UPPER ENDOSCOPY-EGD  Doctor: DR Edi PERRIN           Arrival Time:  11 AM    *check in at Emergency/Endoscopy desk*  Procedure Time: 11 30 AM       Location:   [facility]        Endoscopy Department, First Floor (Enter through ER Doors) *         [address] [place], [place] [address] [phone] or [phone] ([facility]) to reschedule          PRE-PROCEDURE CHECKLIST    If you have diabetes, ask your regular doctor for diet and medication restrictions.  If you take any antiplatelet or anticoagulant medications (such as Coumadin, Lovenox, Plavix, etc.) and have not already discussed this, please call your primary physician for advice on holding this medication.  If you take Aspirin, you may continue to do so.  If you are or may be pregnant, please discuss the risks and benefits of this procedure with your doctor.  You must arrange for a ride for the day of your exam. If you fail to arrange transportation with a responsible adult, your procedure will need to be cancelled and rescheduled. Taxi, bus and medical transport are not acceptable unless you have a responsible adult that you know & trust with you. Please arrange for this  to be able to pick you up in our department, approximately one hour after your scheduled procedure, if they are not able to stay with you.      Canceling or rescheduling   If you must cancel or reschedule your appointment, please call [phone] as soon as possible.      Upper Endoscopy or  Esophagogastroduodenoscopy (EGD) is a test performed to evaluate symptoms of persistent abdominal pain, nausea, vomiting and difficulty swallowing. It may also be used to treat various conditions of the upper gastrointestinal tract, such as bleeding, narrowing or abnormal growths.     What happens during an upper endoscopy?  On the day of your procedure, plan to spend up to one and a half hour after your arrival at the endoscopy center. The exam itself takes about 5 to 10 minutes.    Before the exam:  - You will change into a gown.   - Your medical history and medication list will be reviewed with you, unless it has already been done over the phone.   - A nurse will insert an intravenous (IV) line into your hand or arm.  - The doctor will talk to you and give you a consent form to sign.    During the exam:  - Medicine will be given through the IV line to help you relax and feel comfortable.   - Your heart rate and oxygen levels will be monitored. If your blood pressure is low, you may be given fluids through the IV line.   - The doctor will insert a flexible, hollow tube, called an endoscope, into your mouth and will advance it slowly through the esophagus, stomach and duodenum (the first part of your small intestine).   - You may have a feeling of pressure or fullness.   - If you have difficulty swallowing, and the doctor finds a narrowing in your esophagus, it may be possible for the area to be expanded-dilated during the exam.   - If abnormal tissue is found, the doctor may remove it through the endoscope (biopsy it) for closer examination. The tissue removal is painless.    After the exam:  - Any tissue samples removed during the exam will be sent to a lab for evaluation. It may take 5 to 7 working days for you to be notified of the results  - The doctor will prepare a full report for the physician who referred you for the upper endoscopy.   - The doctor will talk with you about the initial results of your exam.    - You may feel bloated after the procedure. That is normal and should not last long.   - Your throat may feel sore for a short time.   - Following the exam, you may resume your normal diet. Avoid alcohol until the next day.   - You may resume your regular activities the day after the procedure.   - Medication given during the exam will prohibit you from driving for the rest of the day.  - A nurse will provide you with complete discharge instructions before you leave the endoscopy center. Be sure to ask the nurse for specific instructions if you take blood thinners such as Aspirin , Coumadin , Lovenox , Plavix , etc.       PREPARATION    To ensure a successful exam, please follow all instructions carefully.      The night before your exam:    STOP eating solid foods at 11:45 pm.     Clear liquids are okay to drink (examples: Gatorade , apple juice, clear broth,coffee or tea without milk or cream, etc.).     DO NOT drink red liquids or alcoholic beverages.    The day of your exam:    STOP drinking clear liquids 4 hours before your exam.     You may take your usual medications with 4 oz. of water, but it needs to be at least 4 hours prior to your procedure.    When you leave for the procedure:    Bring a list of all of your current medications, including any allergy or over-the-counter medications, unless you have already reviewed that with an Endoscopy RN over the phone.     Bring a photo ID as well as up-to-date insurance information, such as your insurance card and any referral forms that might be required by your payer.         DIRECTIONS TO THE ENDOSCOPY DEPARTMENT    From the north ([facility])  Take [address]W south, exit on [address] . Get into the left hand ema, turn left (east), go one-half mile to [address] and turn left. Go north to the first stoplight, take a right on [address] and follow it to the Emergency entrance.    From the south ([facility])  Take 35N  to the [address]E split and exit on [address] . On [address] , turn left (west) to [address]. Turn right (north) on [address]. Go north to the first stoplight, take a right on [address] and follow it to the Emergency entrance.    From the east via 35E ([facility])  Take [address]E south to [place] [address] exit. Turn right on [address] . Go west to [address]. Turn right (north) on [address]. Go to the first stoplight, take a right and follow on [address] to the Emergency entrance.    From the east via [address] ([facility])  Take [address] to [address]. Turn left (south) on [address] to [address]. Turn left (east) on [address] and follow it to the Emergency entrance.    From the west via Highway 13 ([place])  Take [address] east to [address]. Turn right (south) on [address] to [address]. Turn left (east) on [address] and follow it to the Emergency entrance.

## 2019-11-08 ENCOUNTER — HEALTH MAINTENANCE LETTER (OUTPATIENT)
Age: 74
End: 2019-11-08

## 2019-11-21 ENCOUNTER — HOSPITAL ENCOUNTER (OUTPATIENT)
Dept: MAMMOGRAPHY | Facility: CLINIC | Age: 74
Discharge: HOME | End: 2019-11-21
Attending: INTERNAL MEDICINE | Admitting: INTERNAL MEDICINE
Payer: COMMERCIAL

## 2019-11-21 DIAGNOSIS — Z12.31 VISIT FOR SCREENING MAMMOGRAM: ICD-10-CM

## 2019-11-21 PROCEDURE — 77063 BREAST TOMOSYNTHESIS BI: CPT

## 2019-11-22 ENCOUNTER — MYC MEDICAL ADVICE (OUTPATIENT)
Dept: PEDIATRICS | Facility: CLINIC | Age: 74
End: 2019-11-22

## 2019-11-22 DIAGNOSIS — I10 ESSENTIAL HYPERTENSION, BENIGN: ICD-10-CM

## 2019-11-22 RX ORDER — AMLODIPINE BESYLATE 5 MG/1
5 TABLET ORAL DAILY
Qty: 90 TABLET | Refills: 0 | Status: SHIPPED | OUTPATIENT
Start: 2019-11-22 | End: 2019-12-31

## 2019-11-22 NOTE — LETTER
[facility]  [address]  [facility] [address]  Phone: [phone]        November 22, 2019      Lisa Lopez                                                                                                                                [address] [address] [facility] [address]            Dear Ms. Lopez,    We have received a refill request from your pharmacy for your medication. Many medications require routine follow-up with your provider. A review of your chart indicates that you are due for an appointment with your primary care provider. We have sent a one time refill to your pharmacy to allow you time to schedule an appointment.     Please call [phone] to schedule an appointment.  We look forward to seeing you in the near future.      Thank you,     [facility]

## 2019-11-22 NOTE — TELEPHONE ENCOUNTER
"Requested Prescriptions   Pending Prescriptions Disp Refills     amLODIPine (NORVASC) 5 MG tablet  Last Written Prescription Date:  3/1/19  Last Fill Quantity: 90,  # refills: 1   Last office visit: 9/16/2019 with prescribing provider:  Marva   Future Office Visit:     90 tablet 1     Sig: Take 1 tablet (5 mg) by mouth daily       Calcium Channel Blockers Protocol  Passed - 11/22/2019 12:38 PM        Passed - Blood pressure under 140/90 in past 12 months     BP Readings from Last 3 Encounters:   10/16/19 134/70   09/16/19 (!) 153/71   01/21/19 128/68                 Passed - Recent (12 mo) or future (30 days) visit within the authorizing provider's specialty     Patient has had an office visit with the authorizing provider or a provider within the authorizing providers department within the previous 12 mos or has a future within next 30 days. See \"Patient Info\" tab in inbasket, or \"Choose Columns\" in Meds & Orders section of the refill encounter.              Passed - Medication is active on med list        Passed - Patient is age 18 or older        Passed - No active pregnancy on record        Passed - Normal serum creatinine on file in past 12 months     Recent Labs   Lab Test 09/16/19  1640   CR 0.80             Passed - No positive pregnancy test in past 12 months          "

## 2019-11-22 NOTE — TELEPHONE ENCOUNTER
Medication is being filled for 1 time refill only due to:  per 9/16/19 office visit note, patient was advised to return in 4 weeks     No future appointments scheduled. Kid[employer] message sent.

## 2019-12-31 ENCOUNTER — OFFICE VISIT (OUTPATIENT)
Dept: INTERNAL MEDICINE | Facility: CLINIC | Age: 74
End: 2019-12-31
Payer: COMMERCIAL

## 2019-12-31 VITALS
BODY MASS INDEX: 22.82 KG/M2 | TEMPERATURE: 98 F | OXYGEN SATURATION: 98 % | WEIGHT: 142 LBS | SYSTOLIC BLOOD PRESSURE: 112 MMHG | DIASTOLIC BLOOD PRESSURE: 68 MMHG | HEIGHT: 66 IN | RESPIRATION RATE: 16 BRPM | HEART RATE: 62 BPM

## 2019-12-31 DIAGNOSIS — F34.1 DYSTHYMIA: ICD-10-CM

## 2019-12-31 DIAGNOSIS — Z00.00 PREVENTATIVE HEALTH CARE: Primary | ICD-10-CM

## 2019-12-31 DIAGNOSIS — I10 ESSENTIAL HYPERTENSION, BENIGN: ICD-10-CM

## 2019-12-31 DIAGNOSIS — E78.00 HYPERCHOLESTEREMIA: ICD-10-CM

## 2019-12-31 DIAGNOSIS — I47.10 PAROXYSMAL SUPRAVENTRICULAR TACHYCARDIA (H): ICD-10-CM

## 2019-12-31 LAB
ALBUMIN SERPL-MCNC: 3.7 G/DL (ref 3.4–5)
ALP SERPL-CCNC: 68 U/L (ref 40–150)
ALT SERPL W P-5'-P-CCNC: 33 U/L (ref 0–50)
ANION GAP SERPL CALCULATED.3IONS-SCNC: 5 MMOL/L (ref 3–14)
AST SERPL W P-5'-P-CCNC: 22 U/L (ref 0–45)
BILIRUB SERPL-MCNC: 0.4 MG/DL (ref 0.2–1.3)
BUN SERPL-MCNC: 17 MG/DL (ref 7–30)
CALCIUM SERPL-MCNC: 9.8 MG/DL (ref 8.5–10.1)
CHLORIDE SERPL-SCNC: 109 MMOL/L (ref 94–109)
CHOLEST SERPL-MCNC: 197 MG/DL
CO2 SERPL-SCNC: 27 MMOL/L (ref 20–32)
CREAT SERPL-MCNC: 0.9 MG/DL (ref 0.52–1.04)
ERYTHROCYTE [DISTWIDTH] IN BLOOD BY AUTOMATED COUNT: 12.9 % (ref 10–15)
GFR SERPL CREATININE-BSD FRML MDRD: 61 ML/MIN/{1.73_M2}
GLUCOSE SERPL-MCNC: 100 MG/DL (ref 70–99)
HCT VFR BLD AUTO: 41.5 % (ref 35–47)
HDLC SERPL-MCNC: 72 MG/DL
HGB BLD-MCNC: 13.6 G/DL (ref 11.7–15.7)
LDLC SERPL CALC-MCNC: 96 MG/DL
MCH RBC QN AUTO: 31 PG (ref 26.5–33)
MCHC RBC AUTO-ENTMCNC: 32.8 G/DL (ref 31.5–36.5)
MCV RBC AUTO: 95 FL (ref 78–100)
NONHDLC SERPL-MCNC: 125 MG/DL
PLATELET # BLD AUTO: 329 10E9/L (ref 150–450)
POTASSIUM SERPL-SCNC: 4.9 MMOL/L (ref 3.4–5.3)
PROT SERPL-MCNC: 8 G/DL (ref 6.8–8.8)
RBC # BLD AUTO: 4.39 10E12/L (ref 3.8–5.2)
SODIUM SERPL-SCNC: 141 MMOL/L (ref 133–144)
T4 FREE SERPL-MCNC: 0.81 NG/DL (ref 0.76–1.46)
TRIGL SERPL-MCNC: 147 MG/DL
TSH SERPL DL<=0.005 MIU/L-ACNC: 6.87 MU/L (ref 0.4–4)
WBC # BLD AUTO: 6.3 10E9/L (ref 4–11)

## 2019-12-31 PROCEDURE — 80053 COMPREHEN METABOLIC PANEL: CPT | Performed by: INTERNAL MEDICINE

## 2019-12-31 PROCEDURE — 99397 PER PM REEVAL EST PAT 65+ YR: CPT | Performed by: INTERNAL MEDICINE

## 2019-12-31 PROCEDURE — 99207 C PAF COMPLETED  NO CHARGE: CPT | Mod: 25 | Performed by: INTERNAL MEDICINE

## 2019-12-31 PROCEDURE — 36415 COLL VENOUS BLD VENIPUNCTURE: CPT | Performed by: INTERNAL MEDICINE

## 2019-12-31 PROCEDURE — 84439 ASSAY OF FREE THYROXINE: CPT | Performed by: INTERNAL MEDICINE

## 2019-12-31 PROCEDURE — 85027 COMPLETE CBC AUTOMATED: CPT | Performed by: INTERNAL MEDICINE

## 2019-12-31 PROCEDURE — 84443 ASSAY THYROID STIM HORMONE: CPT | Performed by: INTERNAL MEDICINE

## 2019-12-31 PROCEDURE — 80061 LIPID PANEL: CPT | Performed by: INTERNAL MEDICINE

## 2019-12-31 RX ORDER — METOPROLOL TARTRATE 25 MG/1
12.5 TABLET, FILM COATED ORAL 2 TIMES DAILY
Qty: 90 TABLET | Refills: 3 | Status: SHIPPED | OUTPATIENT
Start: 2019-12-31 | End: 2020-12-18

## 2019-12-31 RX ORDER — SIMVASTATIN 20 MG
10 TABLET ORAL AT BEDTIME
Qty: 90 TABLET | Refills: 3 | Status: SHIPPED | OUTPATIENT
Start: 2019-12-31 | End: 2021-01-28

## 2019-12-31 RX ORDER — LORAZEPAM 0.5 MG/1
0.25 TABLET ORAL DAILY
Qty: 45 TABLET | Refills: 1 | Status: SHIPPED | OUTPATIENT
Start: 2019-12-31 | End: 2020-08-20

## 2019-12-31 RX ORDER — AMLODIPINE BESYLATE 5 MG/1
5 TABLET ORAL DAILY
Qty: 90 TABLET | Refills: 3 | Status: SHIPPED | OUTPATIENT
Start: 2019-12-31 | End: 2020-02-21

## 2019-12-31 ASSESSMENT — ENCOUNTER SYMPTOMS: NERVOUS/ANXIOUS: 1

## 2019-12-31 ASSESSMENT — ACTIVITIES OF DAILY LIVING (ADL): CURRENT_FUNCTION: NO ASSISTANCE NEEDED

## 2019-12-31 ASSESSMENT — ANXIETY QUESTIONNAIRES
2. NOT BEING ABLE TO STOP OR CONTROL WORRYING: SEVERAL DAYS
3. WORRYING TOO MUCH ABOUT DIFFERENT THINGS: SEVERAL DAYS
IF YOU CHECKED OFF ANY PROBLEMS ON THIS QUESTIONNAIRE, HOW DIFFICULT HAVE THESE PROBLEMS MADE IT FOR YOU TO DO YOUR WORK, TAKE CARE OF THINGS AT HOME, OR GET ALONG WITH OTHER PEOPLE: NOT DIFFICULT AT ALL
5. BEING SO RESTLESS THAT IT IS HARD TO SIT STILL: NOT AT ALL
1. FEELING NERVOUS, ANXIOUS, OR ON EDGE: SEVERAL DAYS
7. FEELING AFRAID AS IF SOMETHING AWFUL MIGHT HAPPEN: NOT AT ALL
GAD7 TOTAL SCORE: 5
6. BECOMING EASILY ANNOYED OR IRRITABLE: SEVERAL DAYS

## 2019-12-31 ASSESSMENT — PATIENT HEALTH QUESTIONNAIRE - PHQ9
5. POOR APPETITE OR OVEREATING: SEVERAL DAYS
10. IF YOU CHECKED OFF ANY PROBLEMS, HOW DIFFICULT HAVE THESE PROBLEMS MADE IT FOR YOU TO DO YOUR WORK, TAKE CARE OF THINGS AT HOME, OR GET ALONG WITH OTHER PEOPLE: NOT DIFFICULT AT ALL
SUM OF ALL RESPONSES TO PHQ QUESTIONS 1-9: 7
SUM OF ALL RESPONSES TO PHQ QUESTIONS 1-9: 7

## 2019-12-31 ASSESSMENT — MIFFLIN-ST. JEOR: SCORE: 1142.92

## 2019-12-31 NOTE — PROGRESS NOTES
"SUBJECTIVE:   Lisa Lopez is a 74 year old female who presents for Preventive Visit.    Fasting.    Are you in the first 12 months of your Medicare coverage?  No    Healthy Habits:     In general, how would you rate your overall health?  Good    Frequency of exercise:  2-3 days/week    Duration of exercise:  15-30 minutes    Do you usually eat at least 4 servings of fruit and vegetables a day, include whole grains    & fiber and avoid regularly eating high fat or \"junk\" foods?  Yes    Taking medications regularly:  Yes    Medication side effects:  None    Ability to successfully perform activities of daily living:  No assistance needed    Home Safety:  No safety concerns identified    Hearing Impairment:  No hearing concerns    In the past 6 months, have you been bothered by leaking of urine?  No    In general, how would you rate your overall mental or emotional health?  Good      PHQ-2 Total Score: 3    Additional concerns today:  No    Do you feel safe in your environment? Yes    Have you ever done Advance Care Planning? (For example, a Health Directive, POLST, or a discussion with a medical provider or your loved ones about your wishes): Yes, advance care planning is on file.    Wears 2 hearing aids.    Fall risk  Fallen 2 or more times in the past year?: No  Any fall with injury in the past year?: No    Cognitive Screening   1) Repeat 3 items (Leader, Season, Table)    2) Clock draw: NORMAL  3) 3 item recall: Recalls 3 objects  Results: 3 items recalled: COGNITIVE IMPAIRMENT LESS LIKELY    Mini-CogTM Copyright TOMASA Wiley. Licensed by the author for use in [facility]; reprinted with permission (georgi@.[facility]). All rights reserved.      Do you have sleep apnea, excessive snoring or daytime drowsiness?: no    Reviewed and updated as needed this visit by clinical staff  Tobacco  Allergies  Meds  Med Hx  Surg Hx  Fam Hx  Soc Hx        Reviewed and updated as needed this visit by Provider      "   Social History     Tobacco Use     Smoking status: Former Smoker     Years: 5.00     Types: Cigarettes     Start date: 1963     Last attempt to quit: 1968     Years since quittin.9     Smokeless tobacco: Never Used     Tobacco comment: Smoked [age] yo.   Substance Use Topics     Alcohol use: Yes     Alcohol/week: 0.0 standard drinks     Comment: 1-2 glasses of wine or beer a week.     If you drink alcohol do you typically have >3 drinks per day or >7 drinks per week? No    Alcohol Use 2019   Prescreen: >3 drinks/day or >7 drinks/week? No   Prescreen: >3 drinks/day or >7 drinks/week? -       Current providers sharing in care for this patient include:   Patient Care Team:  Soco Durahm MD as PCP - General (Internal Medicine)  Soco Durham MD as Assigned PCP    The following health maintenance items are reviewed in Epic and correct as of today:  Health Maintenance   Topic Date Due     ZOSTER IMMUNIZATION (2 of 3) 10/23/2008     PNEUMOCOCCAL IMMUNIZATION 65+ LOW/MEDIUM RISK (2 of 2 - PPSV23) 2016     FALL RISK ASSESSMENT  2019     MEDICARE ANNUAL WELLNESS VISIT  2019     PHQ-9  2020     ADVANCE CARE PLANNING  2020     DTAP/TDAP/TD IMMUNIZATION (2 - Td) 2021     LIPID  2023     COLONOSCOPY  2027     DEXA  Completed     DEPRESSION ACTION PLAN  Completed     INFLUENZA VACCINE  Completed     IPV IMMUNIZATION  Aged Out     MENINGITIS IMMUNIZATION  Aged Out     BP Readings from Last 3 Encounters:   19 112/68   10/16/19 134/70   19 (!) 153/71    Wt Readings from Last 3 Encounters:   19 64.4 kg (142 lb)   19 64.4 kg (141 lb 14.4 oz)   19 66.2 kg (145 lb 14.4 oz)                      Review of Systems   Psychiatric/Behavioral: The patient is nervous/anxious.      CONSTITUTIONAL: NEGATIVE for fever, chills, change in weight  INTEGUMENTARY/SKIN: NEGATIVE for worrisome rashes, moles or lesions  EYES: NEGATIVE for  "vision changes or irritation  ENT/MOUTH: NEGATIVE for ear, mouth and throat problems  RESP: NEGATIVE for significant cough or SOB  BREAST: NEGATIVE for masses, tenderness or discharge  CV: NEGATIVE for chest pain, palpitations or peripheral edema  GI: NEGATIVE for nausea, abdominal pain, heartburn, or change in bowel habits  : NEGATIVE for frequency, dysuria, or hematuria  MUSCULOSKELETAL: NEGATIVE for significant arthralgias or myalgia  NEURO: NEGATIVE for weakness, dizziness or paresthesias  ENDOCRINE: NEGATIVE for temperature intolerance, skin/hair changes  HEME: NEGATIVE for bleeding problems  PSYCHIATRIC: she has a long history of anxiety but is feeling a little more depressed in recent months. Would like to try something for it.     OBJECTIVE:   /68 (BP Location: Right arm, Patient Position: Chair, Cuff Size: Adult Large)   Pulse 62   Temp 98  F (36.7  C) (Oral)   Resp 16   Ht 1.664 m (5' 5.5\")   Wt 64.4 kg (142 lb)   SpO2 98%   Breastfeeding No   BMI 23.27 kg/m   Estimated body mass index is 23.27 kg/m  as calculated from the following:    Height as of this encounter: 1.664 m (5' 5.5\").    Weight as of this encounter: 64.4 kg (142 lb).  Physical Exam  GENERAL APPEARANCE: healthy, alert and no distress  EYES: Eyes grossly normal to inspection, PERRL and conjunctivae and sclerae normal  HENT: ear canals and TM's normal, nose and mouth without ulcers or lesions, oropharynx clear and oral mucous membranes moist  NECK: no adenopathy, no asymmetry, masses, or scars and thyroid normal to palpation  RESP: lungs clear to auscultation - no rales, rhonchi or wheezes  CV: regular rate and rhythm, normal S1 S2, no S3 or S4, no murmur, click or rub, no peripheral edema and peripheral pulses strong  ABDOMEN: soft, nontender, no hepatosplenomegaly, no masses and bowel sounds normal  MS: no musculoskeletal defects are noted and gait is age appropriate without ataxia  SKIN: no suspicious lesions or " "rashes  NEURO: Normal strength and tone, sensory exam grossly normal, mentation intact and speech normal  PSYCH: mentation appears normal and affect normal/bright      ASSESSMENT / PLAN:   1. Preventative health care       2. Dysthymia  Refilled her usual Ativan but will start Zoloft and after a week she will try stopping the Ativan  - LORazepam (ATIVAN) 0.5 MG tablet; Take 0.5 tablets (0.25 mg) by mouth daily  Dispense: 45 tablet; Refill: 1  - sertraline (ZOLOFT) 50 MG tablet; Take 1 tablet (50 mg) by mouth daily  Dispense: 90 tablet; Refill: 3    3. Essential hypertension, benign  under good control Continue current medications. Update labs  - amLODIPine (NORVASC) 5 MG tablet; Take 1 tablet (5 mg) by mouth daily  Dispense: 90 tablet; Refill: 3  - metoprolol tartrate (LOPRESSOR) 25 MG tablet; Take 0.5 tablets (12.5 mg) by mouth 2 times daily  Dispense: 90 tablet; Refill: 3  - CBC with platelets  - TSH with free T4 reflex  - Lipid Profile (Chol, Trig, HDL, LDL calc)  - Comprehensive metabolic panel    4. Paroxysmal supraventricular tachycardia (H)     - metoprolol tartrate (LOPRESSOR) 25 MG tablet; Take 0.5 tablets (12.5 mg) by mouth 2 times daily  Dispense: 90 tablet; Refill: 3      5. Hypercholesteremia  refilled  - simvastatin (ZOCOR) 20 MG tablet; Take 0.5 tablets (10 mg) by mouth At Bedtime  Dispense: 90 tablet; Refill: 3    COUNSELING:  Reviewed preventive health counseling, as reflected in patient instructions       Regular exercise       Healthy diet/nutrition    Estimated body mass index is 23.27 kg/m  as calculated from the following:    Height as of this encounter: 1.664 m (5' 5.5\").    Weight as of this encounter: 64.4 kg (142 lb).         reports that she quit smoking about 51 years ago. Her smoking use included cigarettes. She started smoking about 56 years ago. She quit after 5.00 years of use. She has never used smokeless tobacco.      Appropriate preventive services were discussed with this patient, " including applicable screening as appropriate for cardiovascular disease, diabetes, osteopenia/osteoporosis, and glaucoma.  As appropriate for age/gender, discussed screening for colorectal cancer, prostate cancer, breast cancer, and cervical cancer. Checklist reviewing preventive services available has been given to the patient.    Reviewed patients plan of care and provided an AVS. The Basic Care Plan (routine screening as documented in Health Maintenance) for Lisa meets the Care Plan requirement. This Care Plan has been established and reviewed with the Patient.    Counseling Resources:  ATP IV Guidelines  Pooled Cohorts Equation Calculator  Breast Cancer Risk Calculator  FRAX Risk Assessment  ICSI Preventive Guidelines  Dietary Guidelines for Americans, 2010  [employer] MyPlate  ASA Prophylaxis  Lung CA Screening    Soco Durham MD  [facility]    Identified Health Risks:  Answers for HPI/ROS submitted by the patient on 12/31/2019   Annual Exam:  If you checked off any problems, how difficult have these problems made it for you to do your work, take care of things at home, or get along with other people?: Not difficult at all  PHQ9 TOTAL SCORE: 7

## 2020-01-01 ASSESSMENT — ANXIETY QUESTIONNAIRES: GAD7 TOTAL SCORE: 5

## 2020-01-02 ENCOUNTER — MYC MEDICAL ADVICE (OUTPATIENT)
Dept: INTERNAL MEDICINE | Facility: CLINIC | Age: 75
End: 2020-01-02

## 2020-01-06 ENCOUNTER — MYC MEDICAL ADVICE (OUTPATIENT)
Dept: INTERNAL MEDICINE | Facility: CLINIC | Age: 75
End: 2020-01-06

## 2020-01-09 ENCOUNTER — MYC MEDICAL ADVICE (OUTPATIENT)
Dept: INTERNAL MEDICINE | Facility: CLINIC | Age: 75
End: 2020-01-09

## 2020-01-09 DIAGNOSIS — F34.1 DYSTHYMIA: Primary | ICD-10-CM

## 2020-01-09 RX ORDER — BUPROPION HYDROCHLORIDE 150 MG/1
150 TABLET ORAL EVERY MORNING
Qty: 30 TABLET | Refills: 0 | Status: SHIPPED | OUTPATIENT
Start: 2020-01-09 | End: 2020-02-06 | Stop reason: SINTOL

## 2020-01-09 NOTE — TELEPHONE ENCOUNTER
Please see patient's mychart message below.      Last office visit 12-31-19    Please advise, thanks.

## 2020-01-13 ENCOUNTER — MYC MEDICAL ADVICE (OUTPATIENT)
Dept: INTERNAL MEDICINE | Facility: CLINIC | Age: 75
End: 2020-01-13

## 2020-01-13 ENCOUNTER — OFFICE VISIT (OUTPATIENT)
Dept: AUDIOLOGY | Facility: CLINIC | Age: 75
End: 2020-01-13
Payer: COMMERCIAL

## 2020-01-13 DIAGNOSIS — H90.3 SENSORY HEARING LOSS, BILATERAL: Primary | ICD-10-CM

## 2020-01-13 DIAGNOSIS — K21.00 GASTROESOPHAGEAL REFLUX DISEASE WITH ESOPHAGITIS: Primary | ICD-10-CM

## 2020-01-13 NOTE — PROGRESS NOTES
AUDIOLOGY REPORT    SUBJECTIVE:Lisa Lopez is a 73 year old female who was seen 1/13/2020 in the Audiology Clinic at the [facility] for a follow-up check of her hearing aids. Lisa has been seen previously on 10/29/2018, and results revealed a normal sloping to severe high frequency sensorineural hearing loss bilaterally. The patient has been seen previously in this clinic and was fit with binaural Signia Pure Charge and Go 3NX on 1/21/2019.  Lisa reports good sound quality and ample volume with the hearing aids, with the only problem was the left dome slips out.    OBJECTIVE:   Based on patient report, the following changes were made: Ear canals were checked and clear.  She was given new domes (small sleeve vented right and medium sleeve vented left) Hearing aids were checked electroacoustically and working fine.  They were clear of wax     ASSESSMENT: A follow-up appointment for hearing aid fitting was completed today. Changes to hearing aid was completed as outlined above.   This was a no charge warranty visit.    PLAN:Lisa will return for follow-up as needed, or at least every 4-6 months for cleaning and assessment of hearing aid.   She hopes to have a hearing test at that time if change in hearing is noted.  Please call this clinic with any questions regarding today s appointment.      Bill Concepcion, CCC-A  Licensed Audiologist  [place] #[id number]

## 2020-01-14 NOTE — TELEPHONE ENCOUNTER
"Requested Prescriptions   Pending Prescriptions Disp Refills     omeprazole (PRILOSEC) 20 MG DR capsule [Pharmacy Med Name:  Last Written Prescription Date:  Not on med list  Last Fill Quantity: ,  # refills:    Last office visit: 12/31/2019 with prescribing provider:     Future Office Visit:   OMEPRAZOLE 20MG CAPSULES] 60 capsule      Sig: TAKE 1 TABLET BY MOUTH EVERY DAY       PPI Protocol Failed - 1/13/2020  3:46 AM        Failed - No diagnosis of osteoporosis on record        Failed - Medication is active on med list        Passed - Not on Clopidogrel (unless Pantoprazole ordered)        Passed - Recent (12 mo) or future (30 days) visit within the authorizing provider's specialty     Patient has had an office visit with the authorizing provider or a provider within the authorizing providers department within the previous 12 mos or has a future within next 30 days. See \"Patient Info\" tab in inbasket, or \"Choose Columns\" in Meds & Orders section of the refill encounter.              Passed - Patient is age 18 or older        Passed - No active pregnacy on record        Passed - No positive pregnancy test in past 12 months        "

## 2020-01-14 NOTE — TELEPHONE ENCOUNTER
Patient sent Trellie message stating she has stopped Bupropion due to side effects. She does not want to try any alternative medication at this time. See message from patient for more details.

## 2020-01-15 NOTE — TELEPHONE ENCOUNTER
Routing refill request to provider for review/approval because:       PPI Protocol Failed - 1/13/2020  3:46 AM           Failed - No diagnosis of osteoporosis on record           Failed - Medication is active on med list

## 2020-02-05 ENCOUNTER — MYC MEDICAL ADVICE (OUTPATIENT)
Dept: INTERNAL MEDICINE | Facility: CLINIC | Age: 75
End: 2020-02-05

## 2020-02-05 DIAGNOSIS — F34.1 DYSTHYMIA: ICD-10-CM

## 2020-02-05 NOTE — TELEPHONE ENCOUNTER
"Requested Prescriptions   Pending Prescriptions Disp Refills     buPROPion (WELLBUTRIN XL) 150 MG 24 hr tablet [Pharmacy Med Name: BUPROPION XL 150MG TABLETS (24 H)] 30 tablet 0     Sig: TAKE 1 TABLET(150 MG) BY MOUTH EVERY MORNING   Last Written Prescription Date:  01/09/2020  Last Fill Quantity: 30,  # refills: 0   Last office visit: 12/31/2019 with prescribing provider:     Future Office Visit:      SSRIs Protocol Passed - 2/5/2020  3:47 AM        Passed - PHQ-9 score less than 5 in past 6 months     Please review last PHQ-9 score.           Passed - Medication is Bupropion     If the medication is Bupropion (Wellbutrin), and the patient is taking for smoking cessation; OK to refill.          Passed - Medication is active on med list        Passed - Patient is age 18 or older        Passed - No active pregnancy on record        Passed - No positive pregnancy test in last 12 months        Passed - Recent (6 mo) or future (30 days) visit within the authorizing provider's specialty     Patient had office visit in the last 6 months or has a visit in the next 30 days with authorizing provider or within the authorizing provider's specialty.  See \"Patient Info\" tab in inbasket, or \"Choose Columns\" in Meds & Orders section of the refill encounter.            "

## 2020-02-05 NOTE — TELEPHONE ENCOUNTER
Per 1/13/20 mychart encounter, patient reported stopping this medication due to side effects.    Mychart message sent asking patient if she is still taking this?

## 2020-02-06 RX ORDER — BUPROPION HYDROCHLORIDE 150 MG/1
TABLET ORAL
Qty: 30 TABLET | Refills: 0 | OUTPATIENT
Start: 2020-02-06

## 2020-02-06 NOTE — TELEPHONE ENCOUNTER
Please see NorSunt message below from patient regarding Bupropion.    Spoke with patient.  States she stopped the Bupropion a few days after starting it on 1-9-20 d/t night sweats.  She is feeling down everyday so she feels she needs to be on something.    Sertraline caused dizziness and hot flashes.    Next step?    Last office visit 12-31-19    Please advise, thanks.  (Ok to hold for primary care provider per patient.)

## 2020-02-06 NOTE — TELEPHONE ENCOUNTER
Per [employer] message from patient 2-5-20.  Patient stopped bupropion d/t night sweats.    Medication refused.

## 2020-02-10 NOTE — TELEPHONE ENCOUNTER
[employer] message sent to patient advising her to try 1/2 tab of Bupropion. Advised patient to provide update with how this works for her or contact the clinic with questions.

## 2020-02-12 ENCOUNTER — TELEPHONE (OUTPATIENT)
Dept: INTERNAL MEDICINE | Facility: CLINIC | Age: 75
End: 2020-02-12

## 2020-02-12 NOTE — TELEPHONE ENCOUNTER
Prior Authorization Retail Medication Request    Medication/Dose: LORAZEPAM 0.5 MG TABLETS  ICD code (if different than what is on RX):    Previously Tried and Failed:    Rationale:      Insurance Name:  [employer]  Insurance ID:  [id number]      Pharmacy Information (if different than what is on RX)  [facility]  Phone:  [phone]

## 2020-02-17 NOTE — TELEPHONE ENCOUNTER
PRIOR AUTHORIZATION DENIED    Medication: LORAZEPAM    Denial Date: 2/17/2020    Denial Rational:                  Appeal Information:    If you would like to appeal, please supply P/A team with a letter of medical necessity with clinical reason.

## 2020-02-17 NOTE — TELEPHONE ENCOUNTER
Central Prior Authorization Team   Phone: [phone]      PA Initiation    Medication: LORAZEPAM  Insurance Company: [employer] - Phone [phone] Fax [phone]  [id number] [place], [place] - [address] AT [place] [address]  Filling Pharmacy Phone: [phone]  Filling Pharmacy Fax:    Start Date: 2/17/2020

## 2020-03-12 ENCOUNTER — MYC MEDICAL ADVICE (OUTPATIENT)
Dept: INTERNAL MEDICINE | Facility: CLINIC | Age: 75
End: 2020-03-12

## 2020-03-12 DIAGNOSIS — F34.1 DYSTHYMIA: ICD-10-CM

## 2020-03-12 RX ORDER — BUPROPION HYDROCHLORIDE 150 MG/1
TABLET ORAL
Qty: 30 TABLET | Refills: 0 | Status: SHIPPED | OUTPATIENT
Start: 2020-03-12 | End: 2020-03-23

## 2020-03-12 NOTE — TELEPHONE ENCOUNTER
"Per 2/5/20 mychart encounter:  \"Dr. Durham says you can try taking just a 1/2 tab of the Bupropion to see if you have fewer side effects from the lower dose.   Please let us know how you doing with this or if you have any further questions\"    Please see message below and advise.     Routing refill request to provider for review/approval because:  Drug not active on patient's medication list        "

## 2020-03-23 ENCOUNTER — TELEPHONE (OUTPATIENT)
Dept: INTERNAL MEDICINE | Facility: CLINIC | Age: 75
End: 2020-03-23

## 2020-03-23 DIAGNOSIS — F34.1 DYSTHYMIA: ICD-10-CM

## 2020-03-23 RX ORDER — BUPROPION HYDROCHLORIDE 150 MG/1
TABLET ORAL
Qty: 45 TABLET | Refills: 1 | Status: SHIPPED | OUTPATIENT
Start: 2020-03-23 | End: 2020-03-24

## 2020-03-23 NOTE — TELEPHONE ENCOUNTER
[facility] did not receive this RX on 3/12/2020. Medication is unable to be cut in half, the options are 75 mg immediate release and 12 hour 100 mg tabs. Please send new Rx to [facility] in [place].

## 2020-03-24 RX ORDER — BUPROPION HYDROCHLORIDE 150 MG/1
TABLET ORAL
Qty: 45 TABLET | Refills: 1 | Status: SHIPPED | OUTPATIENT
Start: 2020-03-24 | End: 2021-01-18

## 2020-03-24 NOTE — TELEPHONE ENCOUNTER
"Requested Prescriptions   Pending Prescriptions Disp Refills     buPROPion (WELLBUTRIN XL) 150 MG 24 hr tablet [Pharmacy Med Name: BUPROPION XL 150MG TABLETS (24 H)] 30 tablet 0     Sig: TAKE 1 TABLET(150 MG) BY MOUTH EVERY MORNING       SSRIs Protocol Passed - 3/23/2020  9:17 AM        Passed - PHQ-9 score less than 5 in past 6 months     Please review last PHQ-9 score.           Passed - Medication is Bupropion     If the medication is Bupropion (Wellbutrin), and the patient is taking for smoking cessation; OK to refill.          Passed - Medication is active on med list        Passed - Patient is age 18 or older        Passed - No active pregnancy on record        Passed - No positive pregnancy test in last 12 months        Passed - Recent (6 mo) or future (30 days) visit within the authorizing provider's specialty     Patient had office visit in the last 6 months or has a visit in the next 30 days with authorizing provider or within the authorizing provider's specialty.  See \"Patient Info\" tab in inbasket, or \"Choose Columns\" in Meds & Orders section of the refill encounter.                 Last fill 3/12/20 #30 tabs with 0 refills.    PHQ-9 score:    PHQ 12/31/2019   PHQ-9 Total Score 3   Q9: Thoughts of better off dead/self-harm past 2 weeks Not at all       Prescription approved per [facility] Refill Protocol.    "

## 2020-04-24 ENCOUNTER — MYC MEDICAL ADVICE (OUTPATIENT)
Dept: INTERNAL MEDICINE | Facility: CLINIC | Age: 75
End: 2020-04-24

## 2020-06-30 ENCOUNTER — MYC MEDICAL ADVICE (OUTPATIENT)
Dept: INTERNAL MEDICINE | Facility: CLINIC | Age: 75
End: 2020-06-30

## 2020-06-30 DIAGNOSIS — H91.93 BILATERAL HEARING LOSS, UNSPECIFIED HEARING LOSS TYPE: Primary | ICD-10-CM

## 2020-07-01 NOTE — TELEPHONE ENCOUNTER
Patient requesting referral for hearing evaluation with [facility] at [facility] Patient feels like her hearing is getting a little worse. Audiology referral pended. Routed to PCP to review.

## 2020-07-20 ENCOUNTER — OFFICE VISIT (OUTPATIENT)
Dept: AUDIOLOGY | Facility: CLINIC | Age: 75
End: 2020-07-20
Payer: COMMERCIAL

## 2020-07-20 DIAGNOSIS — H90.3 SENSORY HEARING LOSS, BILATERAL: Primary | ICD-10-CM

## 2020-08-05 ENCOUNTER — MYC MEDICAL ADVICE (OUTPATIENT)
Dept: INTERNAL MEDICINE | Facility: CLINIC | Age: 75
End: 2020-08-05

## 2020-08-05 DIAGNOSIS — R21 RASH: Primary | ICD-10-CM

## 2020-08-06 NOTE — TELEPHONE ENCOUNTER
"Please see [employer] messages below.    Last office visit 12-31-19    Treat patient's wrist and Left arm itching over the phone or recommend appointment?    Per her first [employer] message, \"In the past I had an itching on my wrist and left arm.  It is back and worse. I have been using  Cortizone 10 and Benadryl but doesn t help for more than about 5 minutes.  I had a medication previously but do not recall the name.  Hope I can get something... not sleeping.   Thank you\".    No record of cream for itching in chart.    Please advise, thanks.  (Routed to covering provider.)    "

## 2020-08-13 NOTE — TELEPHONE ENCOUNTER
Per patient's most recent mychart message from patient, would like referral to derm.    Please advise, thanks.

## 2020-08-13 NOTE — TELEPHONE ENCOUNTER
Patient sent Laguo message checking on status of this message. Her symptoms are not improving with OTC Cortizone 10 cream and Benadryl.

## 2020-12-04 ENCOUNTER — HOSPITAL ENCOUNTER (OUTPATIENT)
Dept: MAMMOGRAPHY | Facility: CLINIC | Age: 75
Discharge: HOME | End: 2020-12-04
Attending: INTERNAL MEDICINE | Admitting: INTERNAL MEDICINE
Payer: COMMERCIAL

## 2020-12-04 DIAGNOSIS — Z12.31 VISIT FOR SCREENING MAMMOGRAM: ICD-10-CM

## 2020-12-04 PROCEDURE — 77063 BREAST TOMOSYNTHESIS BI: CPT

## 2020-12-17 DIAGNOSIS — I10 ESSENTIAL HYPERTENSION, BENIGN: ICD-10-CM

## 2020-12-17 DIAGNOSIS — I47.10 PAROXYSMAL SUPRAVENTRICULAR TACHYCARDIA (H): ICD-10-CM

## 2020-12-18 RX ORDER — METOPROLOL TARTRATE 25 MG/1
12.5 TABLET, FILM COATED ORAL 2 TIMES DAILY
Qty: 90 TABLET | Refills: 0 | Status: SHIPPED | OUTPATIENT
Start: 2020-12-18 | End: 2021-01-18

## 2020-12-18 NOTE — TELEPHONE ENCOUNTER
Medication refilled per [facility] protocol    Landy Flores RN

## 2020-12-29 ENCOUNTER — MYC MEDICAL ADVICE (OUTPATIENT)
Dept: INTERNAL MEDICINE | Facility: CLINIC | Age: 75
End: 2020-12-29

## 2021-01-11 ASSESSMENT — ENCOUNTER SYMPTOMS
JOINT SWELLING: 0
CHILLS: 0
SHORTNESS OF BREATH: 0
HEARTBURN: 0
EYE PAIN: 0
ARTHRALGIAS: 0
CONSTIPATION: 0
DYSURIA: 0
HEMATURIA: 0
PALPITATIONS: 0
HEADACHES: 0
DIZZINESS: 0
MYALGIAS: 0
PARESTHESIAS: 0
BREAST MASS: 0
WEAKNESS: 0
NAUSEA: 0
COUGH: 0
ABDOMINAL PAIN: 0
NERVOUS/ANXIOUS: 1
HEMATOCHEZIA: 0
SORE THROAT: 0
FEVER: 0
DIARRHEA: 0
FREQUENCY: 1

## 2021-01-11 ASSESSMENT — ACTIVITIES OF DAILY LIVING (ADL): CURRENT_FUNCTION: NO ASSISTANCE NEEDED

## 2021-01-18 ENCOUNTER — OFFICE VISIT (OUTPATIENT)
Dept: INTERNAL MEDICINE | Facility: CLINIC | Age: 76
End: 2021-01-18
Payer: COMMERCIAL

## 2021-01-18 VITALS
WEIGHT: 139.6 LBS | BODY MASS INDEX: 22.43 KG/M2 | TEMPERATURE: 97.2 F | DIASTOLIC BLOOD PRESSURE: 70 MMHG | OXYGEN SATURATION: 99 % | SYSTOLIC BLOOD PRESSURE: 133 MMHG | RESPIRATION RATE: 16 BRPM | HEART RATE: 60 BPM | HEIGHT: 66 IN

## 2021-01-18 DIAGNOSIS — Z00.00 PREVENTATIVE HEALTH CARE: Primary | ICD-10-CM

## 2021-01-18 DIAGNOSIS — I10 ESSENTIAL HYPERTENSION, BENIGN: ICD-10-CM

## 2021-01-18 DIAGNOSIS — F41.9 ANXIETY: ICD-10-CM

## 2021-01-18 DIAGNOSIS — K59.00 CONSTIPATION, UNSPECIFIED CONSTIPATION TYPE: ICD-10-CM

## 2021-01-18 DIAGNOSIS — I47.10 PAROXYSMAL SUPRAVENTRICULAR TACHYCARDIA (H): ICD-10-CM

## 2021-01-18 DIAGNOSIS — F34.1 DYSTHYMIA: ICD-10-CM

## 2021-01-18 DIAGNOSIS — R13.19 ESOPHAGEAL DYSPHAGIA: ICD-10-CM

## 2021-01-18 LAB
ALBUMIN SERPL-MCNC: 3.9 G/DL (ref 3.4–5)
ALP SERPL-CCNC: 58 U/L (ref 40–150)
ALT SERPL W P-5'-P-CCNC: 30 U/L (ref 0–50)
ANION GAP SERPL CALCULATED.3IONS-SCNC: 2 MMOL/L (ref 3–14)
AST SERPL W P-5'-P-CCNC: 24 U/L (ref 0–45)
BASOPHILS # BLD AUTO: 0 10E9/L (ref 0–0.2)
BASOPHILS NFR BLD AUTO: 0.3 %
BILIRUB SERPL-MCNC: 0.8 MG/DL (ref 0.2–1.3)
BUN SERPL-MCNC: 20 MG/DL (ref 7–30)
CALCIUM SERPL-MCNC: 9.7 MG/DL (ref 8.5–10.1)
CHLORIDE SERPL-SCNC: 108 MMOL/L (ref 94–109)
CHOLEST SERPL-MCNC: 174 MG/DL
CO2 SERPL-SCNC: 29 MMOL/L (ref 20–32)
CREAT SERPL-MCNC: 0.88 MG/DL (ref 0.52–1.04)
DIFFERENTIAL METHOD BLD: NORMAL
EOSINOPHIL # BLD AUTO: 0.2 10E9/L (ref 0–0.7)
EOSINOPHIL NFR BLD AUTO: 2.7 %
ERYTHROCYTE [DISTWIDTH] IN BLOOD BY AUTOMATED COUNT: 13.1 % (ref 10–15)
GFR SERPL CREATININE-BSD FRML MDRD: 63 ML/MIN/{1.73_M2}
GLUCOSE SERPL-MCNC: 91 MG/DL (ref 70–99)
HCT VFR BLD AUTO: 42.8 % (ref 35–47)
HDLC SERPL-MCNC: 59 MG/DL
HGB BLD-MCNC: 14.2 G/DL (ref 11.7–15.7)
LDLC SERPL CALC-MCNC: 90 MG/DL
LYMPHOCYTES # BLD AUTO: 2.5 10E9/L (ref 0.8–5.3)
LYMPHOCYTES NFR BLD AUTO: 39.5 %
MCH RBC QN AUTO: 30.7 PG (ref 26.5–33)
MCHC RBC AUTO-ENTMCNC: 33.2 G/DL (ref 31.5–36.5)
MCV RBC AUTO: 93 FL (ref 78–100)
MONOCYTES # BLD AUTO: 0.6 10E9/L (ref 0–1.3)
MONOCYTES NFR BLD AUTO: 9 %
NEUTROPHILS # BLD AUTO: 3.1 10E9/L (ref 1.6–8.3)
NEUTROPHILS NFR BLD AUTO: 48.5 %
NONHDLC SERPL-MCNC: 115 MG/DL
PLATELET # BLD AUTO: 345 10E9/L (ref 150–450)
POTASSIUM SERPL-SCNC: 4.6 MMOL/L (ref 3.4–5.3)
PROT SERPL-MCNC: 8 G/DL (ref 6.8–8.8)
RBC # BLD AUTO: 4.62 10E12/L (ref 3.8–5.2)
SODIUM SERPL-SCNC: 139 MMOL/L (ref 133–144)
TRIGL SERPL-MCNC: 126 MG/DL
TSH SERPL DL<=0.005 MIU/L-ACNC: 4.36 MU/L (ref 0.4–4)
WBC # BLD AUTO: 6.3 10E9/L (ref 4–11)

## 2021-01-18 PROCEDURE — 84443 ASSAY THYROID STIM HORMONE: CPT | Performed by: INTERNAL MEDICINE

## 2021-01-18 PROCEDURE — 80053 COMPREHEN METABOLIC PANEL: CPT | Performed by: INTERNAL MEDICINE

## 2021-01-18 PROCEDURE — 80061 LIPID PANEL: CPT | Performed by: INTERNAL MEDICINE

## 2021-01-18 PROCEDURE — 99397 PER PM REEVAL EST PAT 65+ YR: CPT | Performed by: INTERNAL MEDICINE

## 2021-01-18 PROCEDURE — 84439 ASSAY OF FREE THYROXINE: CPT | Performed by: INTERNAL MEDICINE

## 2021-01-18 PROCEDURE — 36415 COLL VENOUS BLD VENIPUNCTURE: CPT | Performed by: INTERNAL MEDICINE

## 2021-01-18 PROCEDURE — 99214 OFFICE O/P EST MOD 30 MIN: CPT | Mod: 25 | Performed by: INTERNAL MEDICINE

## 2021-01-18 PROCEDURE — 85025 COMPLETE CBC W/AUTO DIFF WBC: CPT | Performed by: INTERNAL MEDICINE

## 2021-01-18 RX ORDER — AMLODIPINE BESYLATE 5 MG/1
TABLET ORAL
Qty: 90 TABLET | Refills: 0 | Status: SHIPPED | OUTPATIENT
Start: 2021-01-18 | End: 2021-04-20

## 2021-01-18 RX ORDER — LORAZEPAM 0.5 MG/1
0.25 TABLET ORAL DAILY
Qty: 45 TABLET | Refills: 1 | Status: SHIPPED | OUTPATIENT
Start: 2021-01-18 | End: 2021-08-17

## 2021-01-18 RX ORDER — METOPROLOL TARTRATE 25 MG/1
25 TABLET, FILM COATED ORAL 2 TIMES DAILY
Qty: 180 TABLET | Refills: 3 | Status: SHIPPED | OUTPATIENT
Start: 2021-01-18 | End: 2021-05-17

## 2021-01-18 ASSESSMENT — ENCOUNTER SYMPTOMS
HEMATOCHEZIA: 0
ABDOMINAL PAIN: 0
NAUSEA: 0
COUGH: 0
ARTHRALGIAS: 0
FEVER: 0
HEARTBURN: 0
EYE PAIN: 0
CONSTIPATION: 0
DIZZINESS: 0
HEMATURIA: 0
SHORTNESS OF BREATH: 0
FREQUENCY: 1
HEADACHES: 0
WEAKNESS: 0
DYSURIA: 0
SORE THROAT: 0
PARESTHESIAS: 0
JOINT SWELLING: 0
DIARRHEA: 0
MYALGIAS: 0
PALPITATIONS: 0
CHILLS: 0
BREAST MASS: 0
NERVOUS/ANXIOUS: 1

## 2021-01-18 ASSESSMENT — ANXIETY QUESTIONNAIRES
7. FEELING AFRAID AS IF SOMETHING AWFUL MIGHT HAPPEN: NOT AT ALL
GAD7 TOTAL SCORE: 4
6. BECOMING EASILY ANNOYED OR IRRITABLE: SEVERAL DAYS
1. FEELING NERVOUS, ANXIOUS, OR ON EDGE: SEVERAL DAYS
3. WORRYING TOO MUCH ABOUT DIFFERENT THINGS: SEVERAL DAYS
IF YOU CHECKED OFF ANY PROBLEMS ON THIS QUESTIONNAIRE, HOW DIFFICULT HAVE THESE PROBLEMS MADE IT FOR YOU TO DO YOUR WORK, TAKE CARE OF THINGS AT HOME, OR GET ALONG WITH OTHER PEOPLE: NOT DIFFICULT AT ALL
5. BEING SO RESTLESS THAT IT IS HARD TO SIT STILL: NOT AT ALL
2. NOT BEING ABLE TO STOP OR CONTROL WORRYING: SEVERAL DAYS

## 2021-01-18 ASSESSMENT — ACTIVITIES OF DAILY LIVING (ADL): CURRENT_FUNCTION: NO ASSISTANCE NEEDED

## 2021-01-18 ASSESSMENT — PATIENT HEALTH QUESTIONNAIRE - PHQ9
5. POOR APPETITE OR OVEREATING: NOT AT ALL
SUM OF ALL RESPONSES TO PHQ QUESTIONS 1-9: 6

## 2021-01-18 ASSESSMENT — MIFFLIN-ST. JEOR: SCORE: 1127.03

## 2021-01-18 NOTE — PROGRESS NOTES
"SUBJECTIVE:   Lisa Lopez is a 75 year old female who presents for Preventive Visit.    Fasting. Anxiety- feels \"off\". Follow up SVT and swallowing.     Patient has been advised of split billing requirements and indicates understanding: Yes   Are you in the first 12 months of your Medicare coverage?  No    Healthy Habits:     In general, how would you rate your overall health?  Good    Frequency of exercise:  2-3 days/week    Duration of exercise:  15-30 minutes    Do you usually eat at least 4 servings of fruit and vegetables a day, include whole grains    & fiber and avoid regularly eating high fat or \"junk\" foods?  Yes    Taking medications regularly:  Yes    Medication side effects:  None    Ability to successfully perform activities of daily living:  No assistance needed    Home Safety:  No safety concerns identified    Hearing Impairment:  No hearing concerns    In the past 6 months, have you been bothered by leaking of urine?  No    In general, how would you rate your overall mental or emotional health?  Good      PHQ-2 Total Score: 2    Additional concerns today:  No    Do you feel safe in your environment? Yes    Have you ever done Advance Care Planning? (For example, a Health Directive, POLST, or a discussion with a medical provider or your loved ones about your wishes): Yes, advance care planning is on file.    Has HAs.    Fall risk  Fallen 2 or more times in the past year?: No  Any fall with injury in the past year?: No    Cognitive Screening   1) Repeat 3 items (Leader, Season, Table)    2) Clock draw: ABNORMAL (Hands wrong)  3) 3 item recall: Recalls 3 objects  Results: ABNORMAL clock, 1-2 items recalled: PROBABLE COGNITIVE IMPAIRMENT, **INFORM PROVIDER**    Mini-CogTM Copyright TOMASA Wiley. Licensed by the author for use in [employer]; reprinted with permission (georgi@.[facility]). All rights reserved.      Do you have sleep apnea, excessive snoring or daytime drowsiness?: no    Reviewed " and updated as needed this visit by clinical staff   Allergies  Meds              Reviewed and updated as needed this visit by Provider                Social History     Tobacco Use     Smoking status: Former Smoker     Years: 5.00     Types: Cigarettes     Start date: 1963     Quit date: 1968     Years since quittin.0     Smokeless tobacco: Never Used     Tobacco comment: Smoked 22-27 yo.   Substance Use Topics     Alcohol use: Yes     Alcohol/week: 0.0 standard drinks     Comment: 1-2 glasses of wine or beer a week.     If you drink alcohol do you typically have >3 drinks per day or >7 drinks per week? No    Alcohol Use 2021   Prescreen: >3 drinks/day or >7 drinks/week? No   Prescreen: >3 drinks/day or >7 drinks/week? -     Current providers sharing in care for this patient include:   Patient Care Team:  Soco Durham MD as PCP - General (Internal Medicine)  Soco Durham MD as Assigned PCP    The following health maintenance items are reviewed in Epic and correct as of today:  Health Maintenance   Topic Date Due     HEPATITIS C SCREENING  1963     Pneumococcal Vaccine: 65+ Years (2 of 2 - PPSV23) 2016     PHQ-9  2020     FALL RISK ASSESSMENT  2020     MEDICARE ANNUAL WELLNESS VISIT  2020     DTAP/TDAP/TD IMMUNIZATION (2 - Td) 2021     LIPID  2024     ADVANCE CARE PLANNING  2024     DEXA  2032     DEPRESSION ACTION PLAN  Completed     INFLUENZA VACCINE  Completed     ZOSTER IMMUNIZATION  Completed     Pneumococcal Vaccine: Pediatrics (0 to 5 Years) and At-Risk Patients (6 to 64 Years)  Aged Out     IPV IMMUNIZATION  Aged Out     MENINGITIS IMMUNIZATION  Aged Out     HEPATITIS B IMMUNIZATION  Aged Out     BP Readings from Last 3 Encounters:   21 133/70   19 112/68   10/16/19 134/70    Wt Readings from Last 3 Encounters:   21 63.3 kg (139 lb 9.6 oz)   19 64.4 kg (142 lb)   19 64.4 kg (141 lb 14.4 oz)  "                  Review of Systems   Constitutional: Negative for chills and fever.   HENT: Negative for congestion, ear pain, hearing loss and sore throat.    Eyes: Negative for pain and visual disturbance.   Respiratory: Negative for cough and shortness of breath.    Cardiovascular: Negative for chest pain, palpitations and peripheral edema.   Gastrointestinal: Negative for abdominal pain, constipation, diarrhea, heartburn, hematochezia and nausea.   Breasts:  Negative for tenderness, breast mass and discharge.   Genitourinary: Positive for frequency. Negative for dysuria, genital sores, hematuria, pelvic pain, urgency, vaginal bleeding and vaginal discharge.   Musculoskeletal: Negative for arthralgias, joint swelling and myalgias.   Skin: Negative for rash.   Neurological: Negative for dizziness, weakness, headaches and paresthesias.   Psychiatric/Behavioral: Positive for mood changes. The patient is nervous/anxious.      She has a history of intermittent SVT. It has been coming quite often. She will stop it by putting her face in ice cold water. She does not get lightheaded or short of breath. She is on Metoprolol 12.5 mg bid. She has been more anxious this year but does not tolerate antianxiety medication other than Ativan.    She has a history of esophageal stricture with food sticking. Her last dilatation was 2017. This year she is having a lot more problems with food sticking again. 3 episodes in 12 months.     She alos is having problems with her kandy. She will get constipated and then when her stool paases becaomes almost diarrhea. She then takes imodium.      OBJECTIVE:   There were no vitals taken for this visit. Estimated body mass index is 23.27 kg/m  as calculated from the following:    Height as of 12/31/19: 1.664 m (5' 5.5\").    Weight as of 12/31/19: 64.4 kg (142 lb).  Physical Exam  GENERAL: healthy, alert and no distress  EYES: Eyes grossly normal to inspection, PERRL and conjunctivae and " sclerae normal  NECK: no adenopathy, no asymmetry, masses, or scars and thyroid normal to palpation  RESP: lungs clear to auscultation - no rales, rhonchi or wheezes  CV: regular rate and rhythm, normal S1 S2, no S3 or S4, no murmur, click or rub, no peripheral edema and peripheral pulses strong  ABDOMEN: soft, nontender, no hepatosplenomegaly, no masses and bowel sounds normal  MS: no gross musculoskeletal defects noted, no edema  SKIN: no suspicious lesions or rashes  NEURO: Normal strength and tone, mentation intact and speech normal  PSYCH: mentation appears normal, affect normal/bright      ASSESSMENT / PLAN:   1. Preventative health care       2. Paroxysmal supraventricular tachycardia (H)  Will increase Metoprolol to 25 mg bid and she will Follow up with Cardiology   - metoprolol tartrate (LOPRESSOR) 25 MG tablet; Take 1 tablet (25 mg) by mouth 2 times daily  Dispense: 180 tablet; Refill: 3  - OFFICE/OUTPT VISIT,EST,LEVL III    3. Anxiety  Metoprolol might help her anxiety as well as her SVT  - OFFICE/OUTPT VISIT,EST,LEVL III    4. Esophageal dysphagia  Will refer to for dilatation   - GASTROENTEROLOGY ADULT REF PROCEDURE ONLY; Future  - OFFICE/OUTPT VISIT,EST,LEVL III    5. Constipation, unspecified constipation type  recommended she try Murelax and avoid imodium. Follow up if no improvement.   - OFFICE/OUTPT VISIT,EST,LEVL III    6. Essential hypertension, benign  under good control will update labs  - amLODIPine (NORVASC) 5 MG tablet; TAKE 1 TABLET(5 MG) BY MOUTH DAILY  Dispense: 90 tablet; Refill: 0  - metoprolol tartrate (LOPRESSOR) 25 MG tablet; Take 1 tablet (25 mg) by mouth 2 times daily  Dispense: 180 tablet; Refill: 3  - CBC with platelets and differential  - TSH with free T4 reflex  - Comprehensive metabolic panel (BMP + Alb, Alk Phos, ALT, AST, Total. Bili, TP)  - Lipid Profile (Chol, Trig, HDL, LDL calc)    7. Dysthymia   refilled  - LORazepam (ATIVAN) 0.5 MG tablet; Take 0.5 tablets (0.25 mg) by  "mouth daily  Dispense: 45 tablet; Refill: 1    Patient has been advised of split billing requirements and indicates understanding: Yes  COUNSELING:  Reviewed preventive health counseling, as reflected in patient instructions       Regular exercise       Healthy diet/nutrition    Estimated body mass index is 23.27 kg/m  as calculated from the following:    Height as of 12/31/19: 1.664 m (5' 5.5\").    Weight as of 12/31/19: 64.4 kg (142 lb).        She reports that she quit smoking about 53 years ago. Her smoking use included cigarettes. She started smoking about 58 years ago. She quit after 5.00 years of use. She has never used smokeless tobacco.      Appropriate preventive services were discussed with this patient, including applicable screening as appropriate for cardiovascular disease, diabetes, osteopenia/osteoporosis, and glaucoma.  As appropriate for age/gender, discussed screening for colorectal cancer, prostate cancer, breast cancer, and cervical cancer. Checklist reviewing preventive services available has been given to the patient.    Reviewed patients plan of care and provided an AVS. The Basic Care Plan (routine screening as documented in Health Maintenance) for Lisa meets the Care Plan requirement. This Care Plan has been established and reviewed with the Patient.    Counseling Resources:  ATP IV Guidelines  Pooled Cohorts Equation Calculator  Breast Cancer Risk Calculator  Breast Cancer: Medication to Reduce Risk  FRAX Risk Assessment  ICSI Preventive Guidelines  Dietary Guidelines for Americans, 2010  [employer] MyPlate  ASA Prophylaxis  Lung CA Screening    Soco Durham MD  [facility]    Identified Health Risks:  "

## 2021-01-19 LAB — T4 FREE SERPL-MCNC: 0.99 NG/DL (ref 0.76–1.46)

## 2021-01-19 ASSESSMENT — ANXIETY QUESTIONNAIRES: GAD7 TOTAL SCORE: 4

## 2021-01-25 DIAGNOSIS — Z11.59 ENCOUNTER FOR SCREENING FOR OTHER VIRAL DISEASES: Primary | ICD-10-CM

## 2021-01-28 DIAGNOSIS — E78.00 HYPERCHOLESTEREMIA: ICD-10-CM

## 2021-01-28 RX ORDER — SIMVASTATIN 20 MG
10 TABLET ORAL AT BEDTIME
Qty: 90 TABLET | Refills: 3 | Status: SHIPPED | OUTPATIENT
Start: 2021-01-28 | End: 2022-04-14

## 2021-01-28 NOTE — TELEPHONE ENCOUNTER
Pending Prescriptions:                       Disp   Refills    simvastatin (ZOCOR) 20 MG tablet           90 tab*3        Sig: Take 0.5 tablets (10 mg) by mouth At Bedtime    Routing refill request to provider for review/approval because:  Drug interaction warning

## 2021-01-29 ENCOUNTER — OFFICE VISIT (OUTPATIENT)
Dept: CARDIOLOGY | Facility: CLINIC | Age: 76
End: 2021-01-29
Payer: COMMERCIAL

## 2021-01-29 VITALS
HEART RATE: 70 BPM | SYSTOLIC BLOOD PRESSURE: 163 MMHG | DIASTOLIC BLOOD PRESSURE: 78 MMHG | BODY MASS INDEX: 22.45 KG/M2 | WEIGHT: 139.7 LBS | HEIGHT: 66 IN

## 2021-01-29 DIAGNOSIS — I47.10 SVT (SUPRAVENTRICULAR TACHYCARDIA) (H): ICD-10-CM

## 2021-01-29 DIAGNOSIS — R00.2 PALPITATIONS: Primary | ICD-10-CM

## 2021-01-29 PROCEDURE — 99213 OFFICE O/P EST LOW 20 MIN: CPT | Performed by: INTERNAL MEDICINE

## 2021-01-29 RX ORDER — CARVEDILOL 12.5 MG/1
12.5 TABLET ORAL 2 TIMES DAILY WITH MEALS
Qty: 60 TABLET | Refills: 3 | Status: SHIPPED | OUTPATIENT
Start: 2021-01-29 | End: 2021-03-08

## 2021-01-29 ASSESSMENT — MIFFLIN-ST. JEOR: SCORE: 1127.49

## 2021-01-29 NOTE — LETTER
1/29/2021    Soco Durham MD  [address]  [facility] [address]    RE: Lisa Lopze       Dear Colleague,    I had the pleasure of seeing Lisa Lopez in the [facility].    HPI and Plan:   See dictation    Orders Placed This Encounter   Procedures     Follow-Up with Cardiologist       Orders Placed This Encounter   Medications     carvedilol (COREG) 12.5 MG tablet     Sig: Take 1 tablet (12.5 mg) by mouth 2 times daily (with meals)     Dispense:  60 tablet     Refill:  3       There are no discontinued medications.      Encounter Diagnoses   Name Primary?     Palpitations Yes     SVT (supraventricular tachycardia) (H)        CURRENT MEDICATIONS:  Current Outpatient Medications   Medication Sig Dispense Refill     amLODIPine (NORVASC) 5 MG tablet TAKE 1 TABLET(5 MG) BY MOUTH DAILY 90 tablet 0     aspirin 81 MG tablet Take 81 mg by mouth daily  30 tablet      carvedilol (COREG) 12.5 MG tablet Take 1 tablet (12.5 mg) by mouth 2 times daily (with meals) 60 tablet 3     Cholecalciferol (VITAMIN D) 2000 UNITS tablet Take 2,000 Units by mouth daily 100 tablet 3     fish oil-omega-3 fatty acids (OMEGA 3) 1000 MG capsule Take 1 g by mouth 2 times daily  90 capsule      LORazepam (ATIVAN) 0.5 MG tablet Take 0.5 tablets (0.25 mg) by mouth daily 45 tablet 1     metoprolol tartrate (LOPRESSOR) 25 MG tablet Take 1 tablet (25 mg) by mouth 2 times daily 180 tablet 3     Multiple Vitamins-Minerals (EYE VITAMINS & MINERALS) TABS        multivitamin, therapeutic with minerals (MULTI-VITAMIN) TABS Take 1 tablet by mouth daily 100 tablet 3     Probiotic Product (PROBIOTIC PO) Take by mouth daily       Resveratrol 250 MG CAPS Take 500 mg by mouth daily       simvastatin (ZOCOR) 20 MG tablet Take 0.5 tablets (10 mg) by mouth At Bedtime 90 tablet 3     omeprazole (PRILOSEC) 20 MG DR capsule Take 1 capsule (20 mg) by mouth daily 90 capsule 3       ALLERGIES     Allergies    Allergen Reactions     Bupropion      Night sweats     Sertraline      Dizziness and hot flashes       PAST MEDICAL HISTORY:  Past Medical History:   Diagnosis Date     Cough 4/15/2014     Dysthymia 4/15/2014     Essential hypertension, benign 4/15/2014     Hypercholesteremia 4/15/2014     Paroxysmal supraventricular tachycardia (H) 4/15/2014       PAST SURGICAL HISTORY:  Past Surgical History:   Procedure Laterality Date     ABDOMEN SURGERY  1972    Tubal ligation     APPENDECTOMY  Unsure    Removed when had vaginal hysterectomy.     COLONOSCOPY N/A 3/23/2017    Procedure: COMBINED COLONOSCOPY, SINGLE OR MULTIPLE BIOPSY/POLYPECTOMY BY BIOPSY;  Surgeon: Devante Sanchez MD;  Location: [facility]     HYSTERECTOMY, PAP NO LONGER INDICATED         FAMILY HISTORY:  Family History   Problem Relation Age of Onset     Diabetes Father 60         66 yo     Asthma Father         Mild     Heart Disease Mother          66 yo      Hypertension Mother         , heart attack, age 67     Hyperlipidemia Mother      Heart Disease Brother          75 yo     Heart Disease Brother          78 yo     Heart Disease Brother          78 yo      Heart Disease Brother 67        Triple bypass     Family History Negative Sister          60 yo MVA      Family History Negative Sister      Family History Negative Daughter      Family History Negative Daughter      Diabetes Sister      Hypertension Sister         , car accident, age 61     Hyperlipidemia Sister      Diabetes Brother      Hypertension Brother         , car accident, age 61     Hyperlipidemia Brother      Hypertension Sister      Hyperlipidemia Sister      Hypertension Brother          following heart surgery, age 77     Hyperlipidemia Brother      Hypertension Brother      Hyperlipidemia Brother      Hypertension Brother         ,stroke after heart surgery     Hyperlipidemia Brother       Cerebrovascular Disease Brother         After heart surgery, from stroke, age 77     Other Cancer Daughter         Brain tumor, ; surgery.       SOCIAL HISTORY:  Social History     Socioeconomic History     Marital status:      Spouse name: None     Number of children: None     Years of education: None     Highest education level: None   Occupational History     Employer: RETIRED   Social Needs     Financial resource strain: None     Food insecurity     Worry: None     Inability: None     Transportation needs     Medical: None     Non-medical: None   Tobacco Use     Smoking status: Former Smoker     Years: 5.00     Types: Cigarettes     Start date: 1963     Quit date: 1968     Years since quittin.0     Smokeless tobacco: Never Used     Tobacco comment: Smoked [age] yo.   Substance and Sexual Activity     Alcohol use: Yes     Alcohol/week: 0.0 standard drinks     Comment: 1-2 glasses of wine or beer a week.     Drug use: No     Sexual activity: Yes     Partners: Male   Lifestyle     Physical activity     Days per week: None     Minutes per session: None     Stress: None   Relationships     Social connections     Talks on phone: None     Gets together: None     Attends Judaism service: None     Active member of club or organization: None     Attends meetings of clubs or organizations: None     Relationship status: None     Intimate partner violence     Fear of current or ex partner: None     Emotionally abused: None     Physically abused: None     Forced sexual activity: None   Other Topics Concern      Service Not Asked     Blood Transfusions Not Asked     Caffeine Concern No     Comment: not caffeine      Occupational Exposure Not Asked     Hobby Hazards Not Asked     Sleep Concern Not Asked     Stress Concern Not Asked     Weight Concern Not Asked     Special Diet No     Comment: regular diet      Back Care Not Asked     Exercise Yes     Comment: 3 times a week at gym       "Bike Helmet Not Asked     Seat Belt Not Asked     Self-Exams Not Asked     Parent/sibling w/ CABG, MI or angioplasty before 65F 55M? Yes     Comment: Mother; heart attack age 53. brother Renato age 53 angioplast   Social History Narrative     None       Review of Systems:  Skin:  Negative       Eyes:  Positive for glasses    ENT:  Positive for hearing loss    Respiratory:  Negative       Cardiovascular:    palpitations;Positive for has SVT  Gastroenterology: Negative      Genitourinary:  Negative      Musculoskeletal:  Negative      Neurologic:  Negative      Psychiatric:  Negative      Heme/Lymph/Imm:  Negative      Endocrine:  Negative        Physical Exam:  Vitals: BP (!) 163/78 (BP Location: Right arm, Patient Position: Sitting, Cuff Size: Adult Regular)   Pulse 70   Ht 1.664 m (5' 5.5\")   Wt 63.4 kg (139 lb 11.2 oz)   LMP  (LMP Unknown)   Breastfeeding No   BMI 22.89 kg/m      Constitutional:  cooperative, alert and oriented, well developed, well nourished, in no acute distress        Skin:  warm and dry to the touch          Head:  normocephalic        Eyes:           Lymph:      ENT:  no pallor or cyanosis        Neck:  carotid pulses are full and equal bilaterally        Respiratory:  clear to auscultation;normal symmetry         Cardiac: regular rhythm;no murmurs, gallops or rubs detected                pulses full and equal                                        GI:  abdomen soft;no bruits        Extremities and Muscular Skeletal:  no deformities, clubbing, cyanosis, erythema observed              Neurological:  no gross motor deficits        Psych:  Alert and Oriented x 3          CC  No referring provider defined for this encounter.                      Thank you for allowing me to participate in the care of your patient.      Sincerely,     Laurence Mcintyre,      [facility] [place]    cc:   No referring provider defined for this encounter.        "

## 2021-01-29 NOTE — PROGRESS NOTES
HPI and Plan:   See dictation    Orders Placed This Encounter   Procedures     Follow-Up with Cardiologist       Orders Placed This Encounter   Medications     carvedilol (COREG) 12.5 MG tablet     Sig: Take 1 tablet (12.5 mg) by mouth 2 times daily (with meals)     Dispense:  60 tablet     Refill:  3       There are no discontinued medications.      Encounter Diagnoses   Name Primary?     Palpitations Yes     SVT (supraventricular tachycardia) (H)        CURRENT MEDICATIONS:  Current Outpatient Medications   Medication Sig Dispense Refill     amLODIPine (NORVASC) 5 MG tablet TAKE 1 TABLET(5 MG) BY MOUTH DAILY 90 tablet 0     aspirin 81 MG tablet Take 81 mg by mouth daily  30 tablet      carvedilol (COREG) 12.5 MG tablet Take 1 tablet (12.5 mg) by mouth 2 times daily (with meals) 60 tablet 3     Cholecalciferol (VITAMIN D) 2000 UNITS tablet Take 2,000 Units by mouth daily 100 tablet 3     fish oil-omega-3 fatty acids (OMEGA 3) 1000 MG capsule Take 1 g by mouth 2 times daily  90 capsule      LORazepam (ATIVAN) 0.5 MG tablet Take 0.5 tablets (0.25 mg) by mouth daily 45 tablet 1     metoprolol tartrate (LOPRESSOR) 25 MG tablet Take 1 tablet (25 mg) by mouth 2 times daily 180 tablet 3     Multiple Vitamins-Minerals (EYE VITAMINS & MINERALS) TABS        multivitamin, therapeutic with minerals (MULTI-VITAMIN) TABS Take 1 tablet by mouth daily 100 tablet 3     Probiotic Product (PROBIOTIC PO) Take by mouth daily       Resveratrol 250 MG CAPS Take 500 mg by mouth daily       simvastatin (ZOCOR) 20 MG tablet Take 0.5 tablets (10 mg) by mouth At Bedtime 90 tablet 3     omeprazole (PRILOSEC) 20 MG DR capsule Take 1 capsule (20 mg) by mouth daily 90 capsule 3       ALLERGIES     Allergies   Allergen Reactions     Bupropion      Night sweats     Sertraline      Dizziness and hot flashes       PAST MEDICAL HISTORY:  Past Medical History:   Diagnosis Date     Cough 4/15/2014     Dysthymia 4/15/2014     Essential hypertension,  benign 4/15/2014     Hypercholesteremia 4/15/2014     Paroxysmal supraventricular tachycardia (H) 4/15/2014       PAST SURGICAL HISTORY:  Past Surgical History:   Procedure Laterality Date     ABDOMEN SURGERY  1972    Tubal ligation     APPENDECTOMY  Unsure    Removed when had vaginal hysterectomy.     COLONOSCOPY N/A 3/23/2017    Procedure: COMBINED COLONOSCOPY, SINGLE OR MULTIPLE BIOPSY/POLYPECTOMY BY BIOPSY;  Surgeon: Devante Sanchez MD;  Location: [facility]     HYSTERECTOMY, PAP NO LONGER INDICATED         FAMILY HISTORY:  Family History   Problem Relation Age of Onset     Diabetes Father 60         68 yo     Asthma Father         Mild     Heart Disease Mother          68 yo      Hypertension Mother         , heart attack, age 67     Hyperlipidemia Mother      Heart Disease Brother          75 yo     Heart Disease Brother          76 yo     Heart Disease Brother          76 yo      Heart Disease Brother 67        Triple bypass     Family History Negative Sister          60 yo MVA      Family History Negative Sister      Family History Negative Daughter      Family History Negative Daughter      Diabetes Sister      Hypertension Sister         , car accident, age 61     Hyperlipidemia Sister      Diabetes Brother      Hypertension Brother         , car accident, age 61     Hyperlipidemia Brother      Hypertension Sister      Hyperlipidemia Sister      Hypertension Brother          following heart surgery, age 77     Hyperlipidemia Brother      Hypertension Brother      Hyperlipidemia Brother      Hypertension Brother         ,stroke after heart surgery     Hyperlipidemia Brother      Cerebrovascular Disease Brother         After heart surgery, from stroke, age 77     Other Cancer Daughter         Brain tumor, ; surgery.       SOCIAL HISTORY:  Social History     Socioeconomic History     Marital status:       Spouse name: None     Number of children: None     Years of education: None     Highest education level: None   Occupational History     Employer: RETIRED   Social Needs     Financial resource strain: None     Food insecurity     Worry: None     Inability: None     Transportation needs     Medical: None     Non-medical: None   Tobacco Use     Smoking status: Former Smoker     Years: 5.00     Types: Cigarettes     Start date: 1963     Quit date: 1968     Years since quittin.0     Smokeless tobacco: Never Used     Tobacco comment: Smoked [age] yo.   Substance and Sexual Activity     Alcohol use: Yes     Alcohol/week: 0.0 standard drinks     Comment: 1-2 glasses of wine or beer a week.     Drug use: No     Sexual activity: Yes     Partners: Male   Lifestyle     Physical activity     Days per week: None     Minutes per session: None     Stress: None   Relationships     Social connections     Talks on phone: None     Gets together: None     Attends Sabianism service: None     Active member of club or organization: None     Attends meetings of clubs or organizations: None     Relationship status: None     Intimate partner violence     Fear of current or ex partner: None     Emotionally abused: None     Physically abused: None     Forced sexual activity: None   Other Topics Concern      Service Not Asked     Blood Transfusions Not Asked     Caffeine Concern No     Comment: not caffeine      Occupational Exposure Not Asked     Hobby Hazards Not Asked     Sleep Concern Not Asked     Stress Concern Not Asked     Weight Concern Not Asked     Special Diet No     Comment: regular diet      Back Care Not Asked     Exercise Yes     Comment: 3 times a week at gym      Bike Helmet Not Asked     Seat Belt Not Asked     Self-Exams Not Asked     Parent/sibling w/ CABG, MI or angioplasty before 65F 55M? Yes     Comment: Mother; heart attack age 53. brother Renato age 53 angioplast   Social History Narrative      "None       Review of Systems:  Skin:  Negative       Eyes:  Positive for glasses    ENT:  Positive for hearing loss    Respiratory:  Negative       Cardiovascular:    palpitations;Positive for has SVT  Gastroenterology: Negative      Genitourinary:  Negative      Musculoskeletal:  Negative      Neurologic:  Negative      Psychiatric:  Negative      Heme/Lymph/Imm:  Negative      Endocrine:  Negative        Physical Exam:  Vitals: BP (!) 163/78 (BP Location: Right arm, Patient Position: Sitting, Cuff Size: Adult Regular)   Pulse 70   Ht 1.664 m (5' 5.5\")   Wt 63.4 kg (139 lb 11.2 oz)   LMP  (LMP Unknown)   Breastfeeding No   BMI 22.89 kg/m      Constitutional:  cooperative, alert and oriented, well developed, well nourished, in no acute distress        Skin:  warm and dry to the touch          Head:  normocephalic        Eyes:           Lymph:      ENT:  no pallor or cyanosis        Neck:  carotid pulses are full and equal bilaterally        Respiratory:  clear to auscultation;normal symmetry         Cardiac: regular rhythm;no murmurs, gallops or rubs detected                pulses full and equal                                        GI:  abdomen soft;no bruits        Extremities and Muscular Skeletal:  no deformities, clubbing, cyanosis, erythema observed              Neurological:  no gross motor deficits        Psych:  Alert and Oriented x 3          CC  No referring provider defined for this encounter.                  "

## 2021-01-29 NOTE — LETTER
1/29/2021      Soco Durham MD  [address] [facility] [address]  [facility] [address]      RE: Lisa Lopez       Dear Colleague,    I had the pleasure of seeing Lsia Lopez in the [facility].    Service Date: 01/29/2021      HISTORY OF PRESENT ILLNESS:  Ms. Lopez is a very pleasant 75-year-old female with a history of SVT, hypertension and also strong family history of premature coronary disease.  She is here for a followup visit.        She had seen her primary recently with increased palpitations and Dr. Durham increased her metoprolol from 12.5 mg twice daily to 25 mg twice daily.  She has noted that her blood pressure has been elevated, even despite this increase, and in fact, today was elevated at 163/78.  Her pulse was 70.  Weight is 139.  Body mass index of 22.        She does feel like she has not had as much palpitations since the increase, but she has only been on this about a week.  She also was asking for recommendations as far as anxiety.  She is experiencing some anxiety and depression lately.  She has tried antianxiety medicines in the past that have not been helpful or she has had side effect.  We talked a little bit about aroma therapy today and also some over-the-counter supplements that may be helpful for her.      PHYSICAL EXAMINATION:  On exam today, her cardiovascular tones are regular without murmur, gallop or rub.  Lungs are clear.  She has no peripheral edema.      SUMMARY:  Ms. Lopez  is a very pleasant 75-year-old female with a history of SVT and hypertension as well as anxiety.  She has had an increase in her metoprolol dose that has helped with the SVT and palpitation symptoms, but she is also experiencing some elevated blood pressures.  I would like to try her on carvedilol instead I think this is a little bit better for blood pressure control and hopefully will also help to reduce her symptoms were related to her SVT.  I am going to  just give her a 30-day supply and see how she does on 12.5 mg of carvedilol.      Once again we also talked about antianxiety medications or alternatives.  I had suggested a couple over-the-counter supplements and then of course aroma therapy.  We can discuss this further on her return appointment.  I have asked her to come back in a few weeks and see if the carvedilol is working a little bit better for both her blood pressure and palpitations.        Please feel free to contact me with any questions you have in regards to her care.      cc:      Soco Durham MD    [facility]   [address] [facility]   [place] [address]         JULIUS YOON,              D: 2021   T: 2021   MT: SHAVON      Name:     AYALA DILLARD   MRN:      [id number]        Account:      [id number]   :      1945           Service Date: 2021      Document: [id number]          Outpatient Encounter Medications as of 2021   Medication Sig Dispense Refill     amLODIPine (NORVASC) 5 MG tablet TAKE 1 TABLET(5 MG) BY MOUTH DAILY 90 tablet 0     aspirin 81 MG tablet Take 81 mg by mouth daily  30 tablet      carvedilol (COREG) 12.5 MG tablet Take 1 tablet (12.5 mg) by mouth 2 times daily (with meals) 60 tablet 3     Cholecalciferol (VITAMIN D) 2000 UNITS tablet Take 2,000 Units by mouth daily 100 tablet 3     fish oil-omega-3 fatty acids (OMEGA 3) 1000 MG capsule Take 1 g by mouth 2 times daily  90 capsule      LORazepam (ATIVAN) 0.5 MG tablet Take 0.5 tablets (0.25 mg) by mouth daily 45 tablet 1     metoprolol tartrate (LOPRESSOR) 25 MG tablet Take 1 tablet (25 mg) by mouth 2 times daily 180 tablet 3     Multiple Vitamins-Minerals (EYE VITAMINS & MINERALS) TABS        multivitamin, therapeutic with minerals (MULTI-VITAMIN) TABS Take 1 tablet by mouth daily 100 tablet 3     Probiotic Product (PROBIOTIC PO) Take by mouth daily       Resveratrol 250 MG CAPS Take 500 mg by mouth daily       simvastatin  (ZOCOR) 20 MG tablet Take 0.5 tablets (10 mg) by mouth At Bedtime 90 tablet 3     omeprazole (PRILOSEC) 20 MG DR capsule Take 1 capsule (20 mg) by mouth daily 90 capsule 3     No facility-administered encounter medications on file as of 1/29/2021.        Again, thank you for allowing me to participate in the care of your patient.      Sincerely,    Laurence Mcintyre,      [facility]

## 2021-01-29 NOTE — PROGRESS NOTES
Service Date: 01/29/2021      HISTORY OF PRESENT ILLNESS:  Ms. Lopez is a very pleasant 75-year-old female with a history of SVT, hypertension and also strong family history of premature coronary disease.  She is here for a followup visit.        She had seen her primary recently with increased palpitations and Dr. Durham increased her metoprolol from 12.5 mg twice daily to 25 mg twice daily.  She has noted that her blood pressure has been elevated, even despite this increase, and in fact, today was elevated at 163/78.  Her pulse was 70.  Weight is 139.  Body mass index of 22.        She does feel like she has not had as much palpitations since the increase, but she has only been on this about a week.  She also was asking for recommendations as far as anxiety.  She is experiencing some anxiety and depression lately.  She has tried antianxiety medicines in the past that have not been helpful or she has had side effect.  We talked a little bit about aroma therapy today and also some over-the-counter supplements that may be helpful for her.      PHYSICAL EXAMINATION:  On exam today, her cardiovascular tones are regular without murmur, gallop or rub.  Lungs are clear.  She has no peripheral edema.      SUMMARY:  Ms. Lopez  is a very pleasant 75-year-old female with a history of SVT and hypertension as well as anxiety.  She has had an increase in her metoprolol dose that has helped with the SVT and palpitation symptoms, but she is also experiencing some elevated blood pressures.  I would like to try her on carvedilol instead I think this is a little bit better for blood pressure control and hopefully will also help to reduce her symptoms were related to her SVT.  I am going to just give her a 30-day supply and see how she does on 12.5 mg of carvedilol.      Once again we also talked about antianxiety medications or alternatives.  I had suggested a couple over-the-counter supplements and then of course aroma  therapy.  We can discuss this further on her return appointment.  I have asked her to come back in a few weeks and see if the carvedilol is working a little bit better for both her blood pressure and palpitations.        Please feel free to contact me with any questions you have in regards to her care.      cc:      Soco Durham MD    [facility]   [address] [facility]   [place] [address]         JULIUS YOON DO             D: 2021   T: 2021   MT: SHAVON      Name:     AYALA DILLARD   MRN:      [id number]        Account:      [id number]   :      1945           Service Date: 2021      Document: [id number]

## 2021-02-02 DIAGNOSIS — Z11.59 ENCOUNTER FOR SCREENING FOR OTHER VIRAL DISEASES: ICD-10-CM

## 2021-02-02 LAB
SARS-COV-2 RNA RESP QL NAA+PROBE: NORMAL
SPECIMEN SOURCE: NORMAL

## 2021-02-02 PROCEDURE — U0003 INFECTIOUS AGENT DETECTION BY NUCLEIC ACID (DNA OR RNA); SEVERE ACUTE RESPIRATORY SYNDROME CORONAVIRUS 2 (SARS-COV-2) (CORONAVIRUS DISEASE [COVID-19]), AMPLIFIED PROBE TECHNIQUE, MAKING USE OF HIGH THROUGHPUT TECHNOLOGIES AS DESCRIBED BY CMS-2020-01-R: HCPCS | Performed by: INTERNAL MEDICINE

## 2021-02-02 PROCEDURE — U0005 INFEC AGEN DETEC AMPLI PROBE: HCPCS | Performed by: INTERNAL MEDICINE

## 2021-02-03 LAB
LABORATORY COMMENT REPORT: NORMAL
SARS-COV-2 RNA RESP QL NAA+PROBE: NEGATIVE
SPECIMEN SOURCE: NORMAL

## 2021-02-05 ENCOUNTER — HOSPITAL ENCOUNTER (OUTPATIENT)
Facility: CLINIC | Age: 76
Discharge: HOME | End: 2021-02-05
Attending: INTERNAL MEDICINE | Admitting: INTERNAL MEDICINE
Payer: COMMERCIAL

## 2021-02-05 VITALS
SYSTOLIC BLOOD PRESSURE: 145 MMHG | DIASTOLIC BLOOD PRESSURE: 72 MMHG | OXYGEN SATURATION: 89 % | TEMPERATURE: 97.1 F | RESPIRATION RATE: 12 BRPM | HEART RATE: 53 BPM

## 2021-02-05 DIAGNOSIS — K21.00 GASTROESOPHAGEAL REFLUX DISEASE WITH ESOPHAGITIS: ICD-10-CM

## 2021-02-05 LAB — UPPER GI ENDOSCOPY: NORMAL

## 2021-02-05 PROCEDURE — 250N000009 HC RX 250: Performed by: INTERNAL MEDICINE

## 2021-02-05 PROCEDURE — 999N000099 HC STATISTIC MODERATE SEDATION < 10 MIN: Performed by: INTERNAL MEDICINE

## 2021-02-05 PROCEDURE — 250N000011 HC RX IP 250 OP 636: Performed by: INTERNAL MEDICINE

## 2021-02-05 PROCEDURE — 43248 EGD GUIDE WIRE INSERTION: CPT | Performed by: INTERNAL MEDICINE

## 2021-02-05 RX ORDER — NALOXONE HYDROCHLORIDE 0.4 MG/ML
0.4 INJECTION, SOLUTION INTRAMUSCULAR; INTRAVENOUS; SUBCUTANEOUS
Status: DISCONTINUED | OUTPATIENT
Start: 2021-02-05 | End: 2021-02-05 | Stop reason: HOSPADM

## 2021-02-05 RX ORDER — FLUMAZENIL 0.1 MG/ML
0.2 INJECTION, SOLUTION INTRAVENOUS
Status: DISCONTINUED | OUTPATIENT
Start: 2021-02-05 | End: 2021-02-05 | Stop reason: HOSPADM

## 2021-02-05 RX ORDER — PROCHLORPERAZINE MALEATE 5 MG
5 TABLET ORAL EVERY 6 HOURS PRN
Status: DISCONTINUED | OUTPATIENT
Start: 2021-02-05 | End: 2021-02-05 | Stop reason: HOSPADM

## 2021-02-05 RX ORDER — LIDOCAINE 40 MG/G
CREAM TOPICAL
Status: DISCONTINUED | OUTPATIENT
Start: 2021-02-05 | End: 2021-02-05 | Stop reason: HOSPADM

## 2021-02-05 RX ORDER — FENTANYL CITRATE 50 UG/ML
INJECTION, SOLUTION INTRAMUSCULAR; INTRAVENOUS PRN
Status: DISCONTINUED | OUTPATIENT
Start: 2021-02-05 | End: 2021-02-05 | Stop reason: HOSPADM

## 2021-02-05 RX ORDER — NALOXONE HYDROCHLORIDE 0.4 MG/ML
0.2 INJECTION, SOLUTION INTRAMUSCULAR; INTRAVENOUS; SUBCUTANEOUS
Status: DISCONTINUED | OUTPATIENT
Start: 2021-02-05 | End: 2021-02-05 | Stop reason: HOSPADM

## 2021-02-05 RX ORDER — ONDANSETRON 4 MG/1
4 TABLET, ORALLY DISINTEGRATING ORAL EVERY 6 HOURS PRN
Status: DISCONTINUED | OUTPATIENT
Start: 2021-02-05 | End: 2021-02-05 | Stop reason: HOSPADM

## 2021-02-05 RX ORDER — ONDANSETRON 2 MG/ML
4 INJECTION INTRAMUSCULAR; INTRAVENOUS EVERY 6 HOURS PRN
Status: DISCONTINUED | OUTPATIENT
Start: 2021-02-05 | End: 2021-02-05 | Stop reason: HOSPADM

## 2021-02-05 RX ORDER — ONDANSETRON 2 MG/ML
4 INJECTION INTRAMUSCULAR; INTRAVENOUS
Status: DISCONTINUED | OUTPATIENT
Start: 2021-02-05 | End: 2021-02-05 | Stop reason: HOSPADM

## 2021-02-05 NOTE — CONSULTS
Pre-Endoscopy History and Physical     Lisa Lopez MRN# [id number]   YOB: 1945 Age: 75 year old     Date of Procedure: 2/5/2021  Primary care provider: Soco Durham  Type of Endoscopy: esophagogastroduodenoscopy (upper GI endoscopy)  Reason for Procedure: dysphagia  Type of Anesthesia Anticipated: Moderate (conscious) sedation    HPI:    Lisa is a 75 year old female who will be undergoing the above procedure.      A history and physical has been performed. The patient's medications and allergies have been reviewed. The risks and benefits of the procedure and the sedation options and risks were discussed with the patient.  All questions were answered and informed consent was obtained.      She denies a personal or family history of anesthesia complications or bleeding disorders.     Allergies   Allergen Reactions     Bupropion      Night sweats     Sertraline      Dizziness and hot flashes        Current Facility-Administered Medications   Medication     0.9% sodium chloride BOLUS     lidocaine (LMX4) kit     lidocaine 1 % 0.1-1 mL     ondansetron (ZOFRAN) injection 4 mg     sodium chloride (PF) 0.9% PF flush 3 mL     sodium chloride (PF) 0.9% PF flush 3 mL     sodium chloride (PF) 0.9% PF flush 3 mL       Patient Active Problem List   Diagnosis     Advance care planning     Paroxysmal supraventricular tachycardia (H)     Hypercholesteremia     Dysthymia     Essential hypertension, benign     Osteoporosis     Chronic pain of left knee        Past Medical History:   Diagnosis Date     Cough 4/15/2014     Dysthymia 4/15/2014     Essential hypertension, benign 4/15/2014     Hypercholesteremia 4/15/2014     Paroxysmal supraventricular tachycardia (H) 4/15/2014        Past Surgical History:   Procedure Laterality Date     ABDOMEN SURGERY  1972    Tubal ligation     APPENDECTOMY  Unsure    Removed when had vaginal hysterectomy.     COLONOSCOPY N/A 3/23/2017    Procedure: COMBINED COLONOSCOPY,  SINGLE OR MULTIPLE BIOPSY/POLYPECTOMY BY BIOPSY;  Surgeon: Devante Sanchez MD;  Location: [facility]     HYSTERECTOMY, PAP NO LONGER INDICATED         Social History     Tobacco Use     Smoking status: Former Smoker     Years: 5.00     Types: Cigarettes     Start date: 1963     Quit date: 1968     Years since quittin.0     Smokeless tobacco: Never Used     Tobacco comment: Smoked [age] yo.   Substance Use Topics     Alcohol use: Yes     Alcohol/week: 0.0 standard drinks     Comment: 1-2 glasses of wine or beer a week.       Family History   Problem Relation Age of Onset     Diabetes Father 60         68 yo     Asthma Father         Mild     Heart Disease Mother          68 yo      Hypertension Mother         , heart attack, age 67     Hyperlipidemia Mother      Heart Disease Brother          75 yo     Heart Disease Brother          78 yo     Heart Disease Brother          78 yo      Heart Disease Brother 67        Triple bypass     Family History Negative Sister          62 yo MVA      Family History Negative Sister      Family History Negative Daughter      Family History Negative Daughter      Diabetes Sister      Hypertension Sister         , car accident, age 61     Hyperlipidemia Sister      Diabetes Brother      Hypertension Brother         , car accident, age 61     Hyperlipidemia Brother      Hypertension Sister      Hyperlipidemia Sister      Hypertension Brother          following heart surgery, age 77     Hyperlipidemia Brother      Hypertension Brother      Hyperlipidemia Brother      Hypertension Brother         ,stroke after heart surgery     Hyperlipidemia Brother      Cerebrovascular Disease Brother         After heart surgery, from stroke, age 77     Other Cancer Daughter         Brain tumor, ; surgery.     Colon Cancer No family hx of        REVIEW OF SYSTEMS:     5 point ROS negative  "except as noted above in HPI, including Gen., Resp., CV, GI &  system review.    PHYSICAL EXAM:   BP (!) 174/84   Pulse 57   Temp 97.1  F (36.2  C) (Temporal)   Resp 18   SpO2 91%  Estimated body mass index is 22.89 kg/m  as calculated from the following:    Height as of 1/29/21: 1.664 m (5' 5.5\").    Weight as of 1/29/21: 63.4 kg (139 lb 11.2 oz).   GENERAL APPEARANCE: healthy  MENTAL STATUS: alert  AIRWAY EXAM: Mallampatti Class I (visualization of the soft palate, fauces, uvula, anterior and posterior pillars)  RESP: lungs clear to auscultation - no rales, rhonchi or wheezes  CV: regular rates and rhythm    DIAGNOSTICS:      Not indicated    IMPRESSION     ASA Class 2 - Mild systemic disease    PLAN:     EGD with possible dilation    The above has been forwarded to the consulting provider.    Signed Electronically by: Torey Colon MD  February 5, 2021    "

## 2021-02-05 NOTE — LETTER
January 20, 2021      Lisa Lopez  [address]  [facility] [address]        Dear Lisa,     Thank you for choosing [facility]. You are scheduled for the following service(s).   Please be aware that coverage of these services is subject to the terms and limitations of your health insurance plan.  Call member services at your health plan with any benefit or coverage questions.    Date:   2/05/2021 Friday      Procedure: UPPER ENDOSCOPY-EGD  Doctor: Dr. Wilson           Arrival Time:  11:00 am   *Enter and check in at the Main Hospital Entrance  Procedure Time: 11:30 am       Location:   [facility]        Endoscopy Department, First Floor *         [address] [place], [place] [id number] [phone] or [phone] () to reschedule            PRE-PROCEDURE CHECKLIST    If you have diabetes, ask your regular doctor for diet and medication restrictions.  If you take any antiplatelet or anticoagulant medications (such as Coumadin, Lovenox, Plavix, etc.) and have not already discussed this, please call your primary physician for advice on holding this medication.  If you take Aspirin, you may continue to do so.  If you are or may be pregnant, please discuss the risks and benefits of this procedure with your doctor.  You must arrange for a ride for the day of your exam. If you fail to arrange transportation with a responsible adult, your procedure will need to be cancelled and rescheduled. Taxi, bus and medical transport are not acceptable unless you have a responsible adult that you know & trust with you. Please arrange for this  to be able to pick you up in our department, approximately one hour after your scheduled procedure, if they are not able to stay with you.      Canceling or rescheduling   If you must cancel or reschedule your appointment, please call [phone] as soon as possible.      Upper Endoscopy or  Esophagogastroduodenoscopy (EGD) is a test performed to evaluate symptoms of persistent abdominal pain, nausea, vomiting and difficulty swallowing. It may also be used to treat various conditions of the upper gastrointestinal tract, such as bleeding, narrowing or abnormal growths.     What happens during an upper endoscopy?  On the day of your procedure, plan to spend up to one and a half hour after your arrival at the endoscopy center. The exam itself takes about 5 to 10 minutes.    Before the exam:  - You will change into a gown.   - Your medical history and medication list will be reviewed with you, unless it has already been done over the phone.   - A nurse will insert an intravenous (IV) line into your hand or arm.  - The doctor will talk to you and give you a consent form to sign.    During the exam:  - Medicine will be given through the IV line to help you relax and feel comfortable.   - Your heart rate and oxygen levels will be monitored. If your blood pressure is low, you may be given fluids through the IV line.   - The doctor will insert a flexible, hollow tube, called an endoscope, into your mouth and will advance it slowly through the esophagus, stomach and duodenum (the first part of your small intestine).   - You may have a feeling of pressure or fullness.   - If you have difficulty swallowing, and the doctor finds a narrowing in your esophagus, it may be possible for the area to be expanded-dilated during the exam.   - If abnormal tissue is found, the doctor may remove it through the endoscope (biopsy it) for closer examination. The tissue removal is painless.    After the exam:  - Any tissue samples removed during the exam will be sent to a lab for evaluation. It may take 5 to 7 working days for you to be notified of the results  - The doctor will prepare a full report for the physician who referred you for the upper endoscopy.   - The doctor will talk with you about the initial results of your exam.    - You may feel bloated after the procedure. That is normal and should not last long.   - Your throat may feel sore for a short time.   - Following the exam, you may resume your normal diet. Avoid alcohol until the next day.   - You may resume your regular activities the day after the procedure.   - Medication given during the exam will prohibit you from driving for the rest of the day.  - A nurse will provide you with complete discharge instructions before you leave the endoscopy center. Be sure to ask the nurse for specific instructions if you take blood thinners such as Aspirin , Coumadin , Lovenox , Plavix , etc.       PREPARATION    To ensure a successful exam, please follow all instructions carefully.      The night before your exam:    STOP eating solid foods at 11:45 pm.     Clear liquids are okay to drink (examples: Gatorade , apple juice, clear broth,coffee or tea without milk or cream, etc.).     DO NOT drink red liquids or alcoholic beverages.    The day of your exam:    STOP drinking clear liquids 4 hours before your exam.     You may take your usual medications with 4 oz. of water, but it needs to be at least 4 hours prior to your procedure.    When you leave for the procedure:    Bring a list of all of your current medications, including any allergy or over-the-counter medications, unless you have already reviewed that with an Endoscopy RN over the phone.     Bring a photo ID as well as up-to-date insurance information, such as your insurance card and any referral forms that might be required by your payer.       DIRECTIONS TO THE ENDOSCOPY DEPARTMENT    From the north ([facility])  Take [address] South, exit on [address] . Get into the left hand ema, turn left (east), go one-half mile to [address] and turn left. Go north to the second stoplight, take a right on [address] and follow it to the Main Hospital entrance.  From the south ([facility])  Take  [address]N to the [address]E split and exit on [address] . On [address] , turn left (west) to [address]. Turn right (north) on [address]. Go north to the second stoplight, take a right on [address] and follow it to the Main Hospital entrance.  From the east via 35E ([facility])  Take [address]E south to [address]  exit. Turn right on [address] . Go west to [address]. Turn right (north) on [address]. Go to the second stoplight, take a right on [address] to the Main Hospital entrance.  From the east via [address] ([facility])  Take [address] to [address]. Turn left (south) on [address] to [address], turn left (east) on [address] and follow it to the Main Hospital entrance.    From the west via [address] ([place])  Take [address] east to [address]. Turn right (south) on [address] to [address], turn left (east) on [address] and follow it to the Main Hospital entrance.

## 2021-02-25 ENCOUNTER — IMMUNIZATION (OUTPATIENT)
Dept: PEDIATRICS | Facility: CLINIC | Age: 76
End: 2021-02-25
Payer: COMMERCIAL

## 2021-02-25 PROCEDURE — 0001A PR COVID VAC PFIZER DIL RECON 30 MCG/0.3 ML IM: CPT

## 2021-02-25 PROCEDURE — 91300 PR COVID VAC PFIZER DIL RECON 30 MCG/0.3 ML IM: CPT

## 2021-03-08 ENCOUNTER — MYC MEDICAL ADVICE (OUTPATIENT)
Dept: INTERNAL MEDICINE | Facility: CLINIC | Age: 76
End: 2021-03-08

## 2021-03-08 ENCOUNTER — OFFICE VISIT (OUTPATIENT)
Dept: INTERNAL MEDICINE | Facility: CLINIC | Age: 76
End: 2021-03-08
Payer: COMMERCIAL

## 2021-03-08 VITALS
OXYGEN SATURATION: 97 % | BODY MASS INDEX: 22.1 KG/M2 | HEART RATE: 70 BPM | HEIGHT: 66 IN | RESPIRATION RATE: 16 BRPM | TEMPERATURE: 97.1 F | WEIGHT: 137.5 LBS | DIASTOLIC BLOOD PRESSURE: 67 MMHG | SYSTOLIC BLOOD PRESSURE: 130 MMHG

## 2021-03-08 DIAGNOSIS — I47.10 SVT (SUPRAVENTRICULAR TACHYCARDIA) (H): ICD-10-CM

## 2021-03-08 DIAGNOSIS — K21.00 GASTROESOPHAGEAL REFLUX DISEASE WITH ESOPHAGITIS WITHOUT HEMORRHAGE: ICD-10-CM

## 2021-03-08 DIAGNOSIS — R82.90 NONSPECIFIC FINDING ON EXAMINATION OF URINE: ICD-10-CM

## 2021-03-08 DIAGNOSIS — M85.851 OSTEOPENIA OF BOTH HIPS: ICD-10-CM

## 2021-03-08 DIAGNOSIS — M85.852 OSTEOPENIA OF BOTH HIPS: ICD-10-CM

## 2021-03-08 DIAGNOSIS — R30.0 DYSURIA: Primary | ICD-10-CM

## 2021-03-08 DIAGNOSIS — R00.2 PALPITATIONS: ICD-10-CM

## 2021-03-08 LAB
BACTERIA #/AREA URNS HPF: ABNORMAL /HPF
NON-SQ EPI CELLS #/AREA URNS LPF: ABNORMAL /LPF
RBC #/AREA URNS AUTO: ABNORMAL /HPF
WBC #/AREA URNS AUTO: >100 /HPF

## 2021-03-08 PROCEDURE — 87086 URINE CULTURE/COLONY COUNT: CPT | Performed by: INTERNAL MEDICINE

## 2021-03-08 PROCEDURE — 99213 OFFICE O/P EST LOW 20 MIN: CPT | Performed by: INTERNAL MEDICINE

## 2021-03-08 PROCEDURE — 81015 MICROSCOPIC EXAM OF URINE: CPT | Performed by: INTERNAL MEDICINE

## 2021-03-08 PROCEDURE — 87088 URINE BACTERIA CULTURE: CPT | Performed by: INTERNAL MEDICINE

## 2021-03-08 PROCEDURE — 87186 SC STD MICRODIL/AGAR DIL: CPT | Performed by: INTERNAL MEDICINE

## 2021-03-08 RX ORDER — CIPROFLOXACIN 500 MG/1
500 TABLET, FILM COATED ORAL 2 TIMES DAILY
Qty: 14 TABLET | Refills: 0 | Status: SHIPPED | OUTPATIENT
Start: 2021-03-08 | End: 2021-05-17

## 2021-03-08 RX ORDER — CARVEDILOL 12.5 MG/1
6.25 TABLET ORAL 2 TIMES DAILY WITH MEALS
Qty: 60 TABLET | Refills: 3 | COMMUNITY
Start: 2021-03-08 | End: 2021-05-27

## 2021-03-08 ASSESSMENT — MIFFLIN-ST. JEOR: SCORE: 1117.51

## 2021-03-08 NOTE — PROGRESS NOTES
"    Assessment & Plan     Dysuria     - ciprofloxacin (CIPRO) 500 MG tablet; Take 1 tablet (500 mg) by mouth 2 times daily  - Urine Microscopic    Gastroesophageal reflux disease with esophagitis without hemorrhage  With esophageal stricture tried to explain the stricture is likely to give her more problems than her osteopenia.   - omeprazole (PRILOSEC) 20 MG DR capsule; Take 1 capsule (20 mg) by mouth daily. Strongly encouraged her to take the Prilosec.     Osteopenia of both hips  as above.     Palpitations     - carvedilol (COREG) 12.5 MG tablet; Take 0.5 tablets (6.25 mg) by mouth 2 times daily (with meals)    SVT (supraventricular tachycardia) (H)     - carvedilol (COREG) 12.5 MG tablet; Take 0.5 tablets (6.25 mg) by mouth 2 times daily (with meals)    Gastroesophageal reflux disease with esophagitis              No follow-ups on file.    Soco Durham MD  [facility] BETO Gonzalez is a 75 year old who presents for the following health issues     HPI     Pain and burning with urination for 3 days. Taking pyridium.    Has been well over 1 year since she last had a UTI. ./dfc no flank pain. No blood in urine.     She has a history of esophageal stricture that needs to be dilated every few years. GI wants her on Prilosec. She does not get GERD. She threw the prescription away because she heard it would cause osteoporosis and she says she has osteoporosis.     Last DEXA in 2017 was -1.8 for spine and one hip. Other hip was -2.4. she has never had a fracture.       Review of Systems   Constitutional, HEENT, cardiovascular, pulmonary, GI, , musculoskeletal, neuro, skin, endocrine and psych systems are negative, except as otherwise noted.      Objective    /67 (BP Location: Right arm, Patient Position: Chair, Cuff Size: Adult Large)   Pulse 70   Temp 97.1  F (36.2  C) (Oral)   Resp 16   Ht 1.664 m (5' 5.5\")   Wt 62.4 kg (137 lb 8 oz)   LMP  (LMP Unknown)   SpO2 97%  "  Breastfeeding No   BMI 22.53 kg/m    Body mass index is 22.53 kg/m .  Physical Exam   GENERAL: healthy, alert and no distress  NECK: no adenopathy, no asymmetry, masses, or scars and thyroid normal to palpation  RESP: lungs clear to auscultation - no rales, rhonchi or wheezes  CV: regular rate and rhythm, normal S1 S2, no S3 or S4, no murmur, click or rub, no peripheral edema and peripheral pulses strong  ABDOMEN: soft, nontender, no hepatosplenomegaly, no masses and bowel sounds normal  MS: no gross musculoskeletal defects noted, no edema  NEURO: Normal strength and tone, mentation intact and speech normal  PSYCH: mentation appears normal, affect normal/bright

## 2021-03-09 LAB
BACTERIA SPEC CULT: ABNORMAL
Lab: ABNORMAL
SPECIMEN SOURCE: ABNORMAL

## 2021-03-11 ENCOUNTER — OFFICE VISIT (OUTPATIENT)
Dept: CARDIOLOGY | Facility: CLINIC | Age: 76
End: 2021-03-11
Attending: INTERNAL MEDICINE
Payer: COMMERCIAL

## 2021-03-11 VITALS
BODY MASS INDEX: 22.02 KG/M2 | HEIGHT: 66 IN | WEIGHT: 137 LBS | OXYGEN SATURATION: 97 % | DIASTOLIC BLOOD PRESSURE: 75 MMHG | SYSTOLIC BLOOD PRESSURE: 126 MMHG | HEART RATE: 57 BPM

## 2021-03-11 DIAGNOSIS — R00.2 PALPITATIONS: ICD-10-CM

## 2021-03-11 DIAGNOSIS — I47.10 SVT (SUPRAVENTRICULAR TACHYCARDIA) (H): ICD-10-CM

## 2021-03-11 PROCEDURE — 99213 OFFICE O/P EST LOW 20 MIN: CPT | Performed by: INTERNAL MEDICINE

## 2021-03-11 ASSESSMENT — MIFFLIN-ST. JEOR: SCORE: 1115.24

## 2021-03-11 NOTE — PROGRESS NOTES
HPI and Plan:   See dictation    No orders of the defined types were placed in this encounter.      No orders of the defined types were placed in this encounter.      Medications Discontinued During This Encounter   Medication Reason     multivitamin, therapeutic with minerals (MULTI-VITAMIN) TABS Medication Reconciliation Clean Up     Probiotic Product (PROBIOTIC PO) Medication Reconciliation Clean Up     Resveratrol 250 MG CAPS Medication Reconciliation Clean Up         Encounter Diagnoses   Name Primary?     Palpitations      SVT (supraventricular tachycardia) (H)        CURRENT MEDICATIONS:  Current Outpatient Medications   Medication Sig Dispense Refill     amLODIPine (NORVASC) 5 MG tablet TAKE 1 TABLET(5 MG) BY MOUTH DAILY 90 tablet 0     aspirin 81 MG tablet Take 81 mg by mouth daily  30 tablet      carvedilol (COREG) 12.5 MG tablet Take 0.5 tablets (6.25 mg) by mouth 2 times daily (with meals) 60 tablet 3     Cholecalciferol (VITAMIN D) 2000 UNITS tablet Take 2,000 Units by mouth daily 100 tablet 3     ciprofloxacin (CIPRO) 500 MG tablet Take 1 tablet (500 mg) by mouth 2 times daily 14 tablet 0     fish oil-omega-3 fatty acids (OMEGA 3) 1000 MG capsule Take 1 g by mouth 2 times daily  90 capsule      LORazepam (ATIVAN) 0.5 MG tablet Take 0.5 tablets (0.25 mg) by mouth daily 45 tablet 1     Multiple Vitamins-Minerals (EYE VITAMINS & MINERALS) TABS        omeprazole (PRILOSEC) 20 MG DR capsule Take 1 capsule (20 mg) by mouth daily 90 capsule 3     simvastatin (ZOCOR) 20 MG tablet Take 0.5 tablets (10 mg) by mouth At Bedtime 90 tablet 3     metoprolol tartrate (LOPRESSOR) 25 MG tablet Take 1 tablet (25 mg) by mouth 2 times daily (Patient not taking: Reported on 3/11/2021) 180 tablet 3       ALLERGIES     Allergies   Allergen Reactions     Bupropion      Night sweats     Sertraline      Dizziness and hot flashes       PAST MEDICAL HISTORY:  Past Medical History:   Diagnosis Date     Cough 4/15/2014     Dysthymia  4/15/2014     Essential hypertension, benign 4/15/2014     Hypercholesteremia 4/15/2014     Paroxysmal supraventricular tachycardia (H) 4/15/2014       PAST SURGICAL HISTORY:  Past Surgical History:   Procedure Laterality Date     ABDOMEN SURGERY      Tubal ligation     APPENDECTOMY  Unsure    Removed when had vaginal hysterectomy.     COLONOSCOPY N/A 3/23/2017    Procedure: COMBINED COLONOSCOPY, SINGLE OR MULTIPLE BIOPSY/POLYPECTOMY BY BIOPSY;  Surgeon: Devante Sanchez MD;  Location: [facility]     HYSTERECTOMY, PAP NO LONGER INDICATED         FAMILY HISTORY:  Family History   Problem Relation Age of Onset     Diabetes Father 60         66 yo     Asthma Father         Mild     Heart Disease Mother          66 yo      Hypertension Mother         , heart attack, age 67     Hyperlipidemia Mother      Heart Disease Brother          77 yo     Heart Disease Brother          76 yo     Heart Disease Brother          76 yo      Heart Disease Brother 67        Triple bypass     Family History Negative Sister          60 yo MVA      Family History Negative Sister      Family History Negative Daughter      Family History Negative Daughter      Diabetes Sister      Hypertension Sister         , car accident, age 61     Hyperlipidemia Sister      Diabetes Brother      Hypertension Brother         , car accident, age 61     Hyperlipidemia Brother      Hypertension Sister      Hyperlipidemia Sister      Hypertension Brother          following heart surgery, age 77     Hyperlipidemia Brother      Hypertension Brother      Hyperlipidemia Brother      Hypertension Brother         ,stroke after heart surgery     Hyperlipidemia Brother      Cerebrovascular Disease Brother         After heart surgery, from stroke, age 77     Other Cancer Daughter         Brain tumor, ; surgery.     Colon Cancer No family hx of        SOCIAL  HISTORY:  Social History     Socioeconomic History     Marital status:      Spouse name: None     Number of children: None     Years of education: None     Highest education level: None   Occupational History     Employer: RETIRED   Social Needs     Financial resource strain: None     Food insecurity     Worry: None     Inability: None     Transportation needs     Medical: None     Non-medical: None   Tobacco Use     Smoking status: Former Smoker     Years: 5.00     Types: Cigarettes     Start date: 1963     Quit date: 1968     Years since quittin.1     Smokeless tobacco: Never Used     Tobacco comment: Smoked [age] yo.   Substance and Sexual Activity     Alcohol use: Yes     Alcohol/week: 0.0 standard drinks     Comment: 1-2 glasses of wine or beer a week.     Drug use: No     Sexual activity: Yes     Partners: Male   Lifestyle     Physical activity     Days per week: None     Minutes per session: None     Stress: None   Relationships     Social connections     Talks on phone: None     Gets together: None     Attends Caoda[employer] service: None     Active member of club or organization: None     Attends meetings of clubs or organizations: None     Relationship status: None     Intimate partner violence     Fear of current or ex partner: None     Emotionally abused: None     Physically abused: None     Forced sexual activity: None   Other Topics Concern      Service Not Asked     Blood Transfusions Not Asked     Caffeine Concern No     Comment: not caffeine      Occupational Exposure Not Asked     Hobby Hazards Not Asked     Sleep Concern Not Asked     Stress Concern Not Asked     Weight Concern Not Asked     Special Diet No     Comment: regular diet      Back Care Not Asked     Exercise Yes     Comment: 3 times a week at gym      Bike Helmet Not Asked     Seat Belt Not Asked     Self-Exams Not Asked     Parent/sibling w/ CABG, MI or angioplasty before 65F 55M? Yes     Comment: Mother;  "heart attack age 53. brother Renato age 53 angioplast   Social History Narrative     None       Review of Systems:  Skin:  Negative       Eyes:  Positive for glasses    ENT:  Positive for hearing loss \"minimal\"  Respiratory:  Negative       Cardiovascular:  Negative      Gastroenterology: Positive for reflux controlled with meds  Genitourinary:  Negative      Musculoskeletal:  Negative      Neurologic:  Negative      Psychiatric:  Negative      Heme/Lymph/Imm:  Negative      Endocrine:  Negative        Physical Exam:  Vitals: /75 (BP Location: Right arm, Patient Position: Sitting, Cuff Size: Adult Regular)   Pulse 57   Ht 1.664 m (5' 5.5\")   Wt 62.1 kg (137 lb)   LMP  (LMP Unknown)   SpO2 97%   BMI 22.45 kg/m      Constitutional:  cooperative, alert and oriented, well developed, well nourished, in no acute distress        Skin:  warm and dry to the touch          Head:  normocephalic        Eyes:           Lymph:      ENT:  no pallor or cyanosis        Neck:  carotid pulses are full and equal bilaterally        Respiratory:  clear to auscultation;normal symmetry         Cardiac: regular rhythm;no murmurs, gallops or rubs detected                pulses full and equal                                        GI:  abdomen soft;no bruits        Extremities and Muscular Skeletal:  no deformities, clubbing, cyanosis, erythema observed              Neurological:  no gross motor deficits        Psych:  Alert and Oriented x 3          CC  Laurence Christina Mcintyre,   [address]  [place]  [place] [address]                  "

## 2021-03-11 NOTE — LETTER
3/11/2021    Soco Durham MD  [address]  [facility] [address]    RE: Lisa Lopez       Dear Colleague,    I had the pleasure of seeing Lisa Lopez in the [facility].    HPI and Plan:   See dictation    No orders of the defined types were placed in this encounter.      No orders of the defined types were placed in this encounter.      Medications Discontinued During This Encounter   Medication Reason     multivitamin, therapeutic with minerals (MULTI-VITAMIN) TABS Medication Reconciliation Clean Up     Probiotic Product (PROBIOTIC PO) Medication Reconciliation Clean Up     Resveratrol 250 MG CAPS Medication Reconciliation Clean Up         Encounter Diagnoses   Name Primary?     Palpitations      SVT (supraventricular tachycardia) (H)        CURRENT MEDICATIONS:  Current Outpatient Medications   Medication Sig Dispense Refill     amLODIPine (NORVASC) 5 MG tablet TAKE 1 TABLET(5 MG) BY MOUTH DAILY 90 tablet 0     aspirin 81 MG tablet Take 81 mg by mouth daily  30 tablet      carvedilol (COREG) 12.5 MG tablet Take 0.5 tablets (6.25 mg) by mouth 2 times daily (with meals) 60 tablet 3     Cholecalciferol (VITAMIN D) 2000 UNITS tablet Take 2,000 Units by mouth daily 100 tablet 3     ciprofloxacin (CIPRO) 500 MG tablet Take 1 tablet (500 mg) by mouth 2 times daily 14 tablet 0     fish oil-omega-3 fatty acids (OMEGA 3) 1000 MG capsule Take 1 g by mouth 2 times daily  90 capsule      LORazepam (ATIVAN) 0.5 MG tablet Take 0.5 tablets (0.25 mg) by mouth daily 45 tablet 1     Multiple Vitamins-Minerals (EYE VITAMINS & MINERALS) TABS        omeprazole (PRILOSEC) 20 MG DR capsule Take 1 capsule (20 mg) by mouth daily 90 capsule 3     simvastatin (ZOCOR) 20 MG tablet Take 0.5 tablets (10 mg) by mouth At Bedtime 90 tablet 3     metoprolol tartrate (LOPRESSOR) 25 MG tablet Take 1 tablet (25 mg) by mouth 2 times daily (Patient not taking:  Reported on 3/11/2021) 180 tablet 3       ALLERGIES     Allergies   Allergen Reactions     Bupropion      Night sweats     Sertraline      Dizziness and hot flashes       PAST MEDICAL HISTORY:  Past Medical History:   Diagnosis Date     Cough 4/15/2014     Dysthymia 4/15/2014     Essential hypertension, benign 4/15/2014     Hypercholesteremia 4/15/2014     Paroxysmal supraventricular tachycardia (H) 4/15/2014       PAST SURGICAL HISTORY:  Past Surgical History:   Procedure Laterality Date     ABDOMEN SURGERY  1972    Tubal ligation     APPENDECTOMY  Unsure    Removed when had vaginal hysterectomy.     COLONOSCOPY N/A 3/23/2017    Procedure: COMBINED COLONOSCOPY, SINGLE OR MULTIPLE BIOPSY/POLYPECTOMY BY BIOPSY;  Surgeon: Devante Sanchez MD;  Location: [facility]     HYSTERECTOMY, PAP NO LONGER INDICATED         FAMILY HISTORY:  Family History   Problem Relation Age of Onset     Diabetes Father 60         68 yo     Asthma Father         Mild     Heart Disease Mother          68 yo      Hypertension Mother         , heart attack, age 67     Hyperlipidemia Mother      Heart Disease Brother          75 yo     Heart Disease Brother          76 yo     Heart Disease Brother          76 yo      Heart Disease Brother 67        Triple bypass     Family History Negative Sister          60 yo MVA      Family History Negative Sister      Family History Negative Daughter      Family History Negative Daughter      Diabetes Sister      Hypertension Sister         , car accident, age 61     Hyperlipidemia Sister      Diabetes Brother      Hypertension Brother         , car accident, age 61     Hyperlipidemia Brother      Hypertension Sister      Hyperlipidemia Sister      Hypertension Brother          following heart surgery, age 77     Hyperlipidemia Brother      Hypertension Brother      Hyperlipidemia Brother      Hypertension Brother          ,stroke after heart surgery     Hyperlipidemia Brother      Cerebrovascular Disease Brother         After heart surgery, from stroke, age 77     Other Cancer Daughter         Brain tumor, ; surgery.     Colon Cancer No family hx of        SOCIAL HISTORY:  Social History     Socioeconomic History     Marital status:      Spouse name: None     Number of children: None     Years of education: None     Highest education level: None   Occupational History     Employer: RETIRED   Social Needs     Financial resource strain: None     Food insecurity     Worry: None     Inability: None     Transportation needs     Medical: None     Non-medical: None   Tobacco Use     Smoking status: Former Smoker     Years: 5.00     Types: Cigarettes     Start date: 1963     Quit date: 1968     Years since quittin.1     Smokeless tobacco: Never Used     Tobacco comment: Smoked [age] yo.   Substance and Sexual Activity     Alcohol use: Yes     Alcohol/week: 0.0 standard drinks     Comment: 1-2 glasses of wine or beer a week.     Drug use: No     Sexual activity: Yes     Partners: Male   Lifestyle     Physical activity     Days per week: None     Minutes per session: None     Stress: None   Relationships     Social connections     Talks on phone: None     Gets together: None     Attends Hoahaoism service: None     Active member of club or organization: None     Attends meetings of clubs or organizations: None     Relationship status: None     Intimate partner violence     Fear of current or ex partner: None     Emotionally abused: None     Physically abused: None     Forced sexual activity: None   Other Topics Concern      Service Not Asked     Blood Transfusions Not Asked     Caffeine Concern No     Comment: not caffeine      Occupational Exposure Not Asked     Hobby Hazards Not Asked     Sleep Concern Not Asked     Stress Concern Not Asked     Weight Concern Not Asked     Special Diet No      "Comment: regular diet      Back Care Not Asked     Exercise Yes     Comment: 3 times a week at gym      Bike Helmet Not Asked     Seat Belt Not Asked     Self-Exams Not Asked     Parent/sibling w/ CABG, MI or angioplasty before 65F 55M? Yes     Comment: Mother; heart attack age 53. brother Renato age 53 angioplast   Social History Narrative     None       Review of Systems:  Skin:  Negative       Eyes:  Positive for glasses    ENT:  Positive for hearing loss \"minimal\"  Respiratory:  Negative       Cardiovascular:  Negative      Gastroenterology: Positive for reflux controlled with meds  Genitourinary:  Negative      Musculoskeletal:  Negative      Neurologic:  Negative      Psychiatric:  Negative      Heme/Lymph/Imm:  Negative      Endocrine:  Negative        Physical Exam:  Vitals: /75 (BP Location: Right arm, Patient Position: Sitting, Cuff Size: Adult Regular)   Pulse 57   Ht 1.664 m (5' 5.5\")   Wt 62.1 kg (137 lb)   LMP  (LMP Unknown)   SpO2 97%   BMI 22.45 kg/m      Constitutional:  cooperative, alert and oriented, well developed, well nourished, in no acute distress        Skin:  warm and dry to the touch          Head:  normocephalic        Eyes:           Lymph:      ENT:  no pallor or cyanosis        Neck:  carotid pulses are full and equal bilaterally        Respiratory:  clear to auscultation;normal symmetry         Cardiac: regular rhythm;no murmurs, gallops or rubs detected                pulses full and equal                                        GI:  abdomen soft;no bruits        Extremities and Muscular Skeletal:  no deformities, clubbing, cyanosis, erythema observed              Neurological:  no gross motor deficits        Psych:  Alert and Oriented x 3    Thank you for allowing me to participate in the care of your patient.      Sincerely,     Laurence Mcintyre DO     [facility]      cc:   Laurence Mcintyre, " DO  [address]  [place] [id number]

## 2021-03-11 NOTE — PROGRESS NOTES
Service Date: 03/11/2021      HISTORY OF PRESENT ILLNESS:  Ms. Lopez is a pleasant 75-year-old female with a history of hypertension and SVT and family history of premature coronary disease.  I saw her at the end of January and had transitioned her from metoprolol to carvedilol.  She was noticing more palpitations and her blood pressure was elevated.  Since going off of metoprolol, her palpitations have considerably gotten better.  She did notice a lot of fatigue in the mornings on the dose of carvedilol that I put her on, which was 12.5 mg twice daily.  She decided to cut it in half on her own and this has significantly improved her symptoms.  Overall, she is feeling well.      PHYSICAL EXAMINATION:  Today her blood pressure is 126/75, pulse of 57, weight is 137 with a body mass index of 22.  Physical exam findings were otherwise unchanged.      SUMMARY:  Ms. Lopez is a very pleasant 75-year-old female with a history of SVT, hypertension and family history of coronary disease.  She transitioned from metoprolol to carvedilol with improvement in her palpitations.  She did have some side effect of fatigue with the carvedilol and she ended up cutting the dose in half.  This did improve her symptoms.  She states at the end of the clinic visit today that she may be still has a little bit and wonders if she should cut it back more.  I am really happy with where her blood pressure is right now.  I have asked her to just try this dose for another few weeks and if she is still having troubles, we cannot consider cutting it back further.        I also talked to her about possibly purchasing a blood pressure cuff at home, so she can monitor her blood pressure a little bit better.      I would be happy to see her back annually or as needed.  Please feel free to contact me with any questions you have in regards to her care.      cc:      Soco Durham MD    [facility]   [address]   [place]  [address]         JULIUS YOON DO             D: 2021   T: 2021   MT: SHAVON      Name:     AYALA DILLARD   MRN:      [id number]        Account:      [id number]   :      1945           Service Date: 2021      Document: [id number]

## 2021-03-18 ENCOUNTER — IMMUNIZATION (OUTPATIENT)
Dept: PEDIATRICS | Facility: CLINIC | Age: 76
End: 2021-03-18
Attending: INTERNAL MEDICINE
Payer: COMMERCIAL

## 2021-03-18 PROCEDURE — 0002A PR COVID VAC PFIZER DIL RECON 30 MCG/0.3 ML IM: CPT

## 2021-03-18 PROCEDURE — 91300 PR COVID VAC PFIZER DIL RECON 30 MCG/0.3 ML IM: CPT

## 2021-04-18 ENCOUNTER — MYC MEDICAL ADVICE (OUTPATIENT)
Dept: INTERNAL MEDICINE | Facility: CLINIC | Age: 76
End: 2021-04-18

## 2021-04-18 DIAGNOSIS — F34.1 DYSTHYMIA: ICD-10-CM

## 2021-04-20 NOTE — PROGRESS NOTES
AUDIOLOGY REPORT    SUBJECTIVE: Lisa Lopez is a 75 year old female who was seen in the Audiology Clinic at the [facility] on 4/21/2021 for a follow-up check of their hearing aids. Previous results on 07/20/20 have revealed normal sloping to profound sensorineural hearing loss bilaterally.  The patient has been seen previously in this clinic and was fit with binaural Signia Pure Charge and Go 3NX on 1/21/2019.      Today, Lisa reports short battery life, with the aids only lasting 8-9 hours per day. She reports the left one tends to last longer than the right, although still not lasting a full day. She reports no other major concerns with her hearing aids.    OBJECTIVE:  Based on patient report, the hearing aids will be sent in for in-warranty repair and replacement of rechargeable batteries. No charge visit as hearing aids are under warranty. It was discussed that she will be without hearing aids if she sends both in at the same time or the hearing aids could be sent in one at a time. Lisa would like both sent in at the same time.     Her last hearing evaluation was in 2018 and it was discussed to schedule a hearing test along with a hearing aid check within the next few weeks. She is in agreement with this plan.     ASSESSMENT: A follow-up appointment for hearing aid's was completed today. Hearing aids will be sent in under warranty. No charge appointment today as hearing aids are in warranty.    PLAN: Lisa will be called when hearing aids arrive from repair. She will schedule a hearing aid evaluation for the near future to recheck hearing and make any appropriate hearing aid adjustments at that time. Please call this clinic with any questions regarding today s appointment.    PHOEBE Bennett.   Audiology Doctoral Extern  License #[id number]    I was present with the patient for the entire Audiology appointment including all procedures/testing performed by  the AuD student, and agree with the student s assessment and plan as documented.    Bill Cardenas, CCC-A  Licensed Audiologist  [place] #[id number]

## 2021-04-21 ENCOUNTER — OFFICE VISIT (OUTPATIENT)
Dept: AUDIOLOGY | Facility: CLINIC | Age: 76
End: 2021-04-21
Payer: COMMERCIAL

## 2021-04-21 DIAGNOSIS — H90.3 SENSORY HEARING LOSS, BILATERAL: Primary | ICD-10-CM

## 2021-04-21 PROCEDURE — 99207 PR ASSESSMENT FOR HEARING AID: CPT | Performed by: AUDIOLOGIST

## 2021-05-04 ENCOUNTER — MYC MEDICAL ADVICE (OUTPATIENT)
Dept: INTERNAL MEDICINE | Facility: CLINIC | Age: 76
End: 2021-05-04

## 2021-05-17 ENCOUNTER — OFFICE VISIT (OUTPATIENT)
Dept: INTERNAL MEDICINE | Facility: CLINIC | Age: 76
End: 2021-05-17
Payer: COMMERCIAL

## 2021-05-17 VITALS
SYSTOLIC BLOOD PRESSURE: 121 MMHG | TEMPERATURE: 97.4 F | HEIGHT: 66 IN | WEIGHT: 137.8 LBS | RESPIRATION RATE: 16 BRPM | OXYGEN SATURATION: 98 % | BODY MASS INDEX: 22.14 KG/M2 | DIASTOLIC BLOOD PRESSURE: 70 MMHG

## 2021-05-17 DIAGNOSIS — F32.1 CURRENT MODERATE EPISODE OF MAJOR DEPRESSIVE DISORDER, UNSPECIFIED WHETHER RECURRENT (H): Primary | ICD-10-CM

## 2021-05-17 PROCEDURE — 99214 OFFICE O/P EST MOD 30 MIN: CPT | Performed by: INTERNAL MEDICINE

## 2021-05-17 RX ORDER — LUTEIN 6 MG
TABLET ORAL
COMMUNITY
Start: 2021-05-17

## 2021-05-17 RX ORDER — VENLAFAXINE HYDROCHLORIDE 37.5 MG/1
37.5 CAPSULE, EXTENDED RELEASE ORAL DAILY
Qty: 30 CAPSULE | Refills: 1 | Status: SHIPPED | OUTPATIENT
Start: 2021-05-17 | End: 2021-11-15

## 2021-05-17 RX ORDER — ZINC GLUCONATE 50 MG
50 TABLET ORAL DAILY
COMMUNITY
Start: 2021-05-17

## 2021-05-17 ASSESSMENT — MIFFLIN-ST. JEOR: SCORE: 1118.87

## 2021-05-17 NOTE — PROGRESS NOTES
"    Assessment & Plan     Current moderate episode of major depressive disorder, unspecified whether recurrent (H)  zoloft made her essential tremor significant worse. Will try Effexor. Follow up in 4-6 weeks. Also recommended she try to find 1-2 friends she can talk to about what is going on.   - venlafaxine (EFFEXOR-XR) 37.5 MG 24 hr capsule; Take 1 capsule (37.5 mg) by mouth daily    30 minutes spent talking with the patient, reviewing chart and documenting             No follow-ups on file.    Soco Durham MD  [facility] BETO Gonzalez is a 75 year old who presents for the following health issues     HPI     Follow up depression. Stopped Zoloft after 3 days due to side effects.  She has developed an essential tremor over the years and Zoloft made her tremor markedly worse. She had to stop it after 3 days.     She continues to feel out of sorts. Dreads doing things. Is starting to avoid things she used to enjoy. Has no suicidal ideation passive or active. She says she has lots of friends but has not told anyone she is struggling. She says they have enough of their own problems.     Review of Systems   Constitutional, HEENT, cardiovascular, pulmonary, GI, , musculoskeletal, neuro, skin, endocrine and psych systems are negative, except as otherwise noted.      Objective    /70 (BP Location: Right arm, Patient Position: Chair, Cuff Size: Adult Large)   Temp 97.4  F (36.3  C) (Oral)   Resp 16   Ht 1.664 m (5' 5.5\")   Wt 62.5 kg (137 lb 12.8 oz)   LMP  (LMP Unknown)   SpO2 98%   BMI 22.58 kg/m    Body mass index is 22.58 kg/m .  Physical Exam   GENERAL: healthy, alert and no distress  NECK: no adenopathy, no asymmetry, masses, or scars and thyroid normal to palpation  RESP: lungs clear to auscultation - no rales, rhonchi or wheezes  CV: regular rate and rhythm, normal S1 S2, no S3 or S4, no murmur, click or rub, no peripheral edema and peripheral pulses " strong  ABDOMEN: soft, nontender, no hepatosplenomegaly, no masses and bowel sounds normal  MS: no gross musculoskeletal defects noted, no edema  NEURO: Normal strength and tone and slight tremor  PSYCH: mentation appears normal, affect normal/bright

## 2021-05-21 ENCOUNTER — TELEPHONE (OUTPATIENT)
Dept: AUDIOLOGY | Facility: CLINIC | Age: 76
End: 2021-05-21

## 2021-05-21 NOTE — TELEPHONE ENCOUNTER
[facility] Call Center    Phone Message    May a detailed message be left on voicemail: yes     Reason for Call: Other: Patient called in wanting to know the status on her broken hearing aids that she brought in to be repaired in April. She stated I was only supposed to take a week or so and it's been a month. Please call the patient back to discuss/advise. Thanks!    Action Taken: Message routed to:  [facility] ([facility] Audiology     Travel Screening: Not Applicable

## 2021-05-21 NOTE — TELEPHONE ENCOUNTER
Called patient and let her know we had to send back original repair due to a defect and the newly repaired ones will be arriving Monday. We will call her early next week when they are ready to be picked up.      Bill Cardenas, [facility]  Licensed Audiologist  [place] #[id number]

## 2021-05-27 ENCOUNTER — MYC MEDICAL ADVICE (OUTPATIENT)
Dept: INTERNAL MEDICINE | Facility: CLINIC | Age: 76
End: 2021-05-27

## 2021-05-27 DIAGNOSIS — I47.10 SVT (SUPRAVENTRICULAR TACHYCARDIA) (H): ICD-10-CM

## 2021-05-27 DIAGNOSIS — Z71.89 ADVANCE CARE PLANNING: ICD-10-CM

## 2021-05-27 DIAGNOSIS — F34.1 DYSTHYMIA: Primary | ICD-10-CM

## 2021-05-27 DIAGNOSIS — R00.2 PALPITATIONS: ICD-10-CM

## 2021-05-27 RX ORDER — CARVEDILOL 12.5 MG/1
6.25 TABLET ORAL 2 TIMES DAILY WITH MEALS
Qty: 90 TABLET | Refills: 1 | Status: SHIPPED | OUTPATIENT
Start: 2021-05-27 | End: 2021-12-20

## 2021-05-28 NOTE — TELEPHONE ENCOUNTER
Patient responded that she would be interested in seeing a mental health provider for help with medications.

## 2021-05-28 NOTE — TELEPHONE ENCOUNTER
Oscart message sent to patient asking her if she wants to see a mental health provider to work on medications.

## 2021-05-28 NOTE — TELEPHONE ENCOUNTER
Can we see if she wants to see mental health provider to work on medications  Dr Durham out of office for a week

## 2021-06-09 NOTE — LETTER
March 2, 2017      Lisa Lopez  [address] [facility] [place] [address]              Dear Lisa Lopez,    Thank you for choosing [facility]. You are scheduled for the following service(s).   Miralax Prep    Procedure:   Colonoscopy   Provider:         Dr. Devante Sanchez  Date:   3/23/2017  Thursday                Arrival Time:   8:30 AM  *check in at Emergency/Endoscopy desk*  Procedure Time:  9:00 AM     Location:   [facility]      Endoscopy Department, First Floor (Enter through ER Doors) *       [address] [place], [place] [address]    [phone] or [phone] () to reschedule   What is a colonoscopy?  A colonoscopy is the most accurate test to detect colon polyps and colon cancer, and the only test where polyps can be removed. During this procedure, a doctor examines the lining of your large intestine and rectum through a flexible tube called a colonoscope.   To produce the best and most accurate results, your colon must be completely clean. You will drink a special bowel cleansing preparation to help clean out your colon. You will also need to follow a special diet several days prior to your scheduled colonoscopy.  What happens during a colonoscopy?  Plan to spend up to two hours, starting at registration time, at the endoscopy center the day of your procedure. The exam itself takes approximately 15 minutes to complete, and recovery is approximately 30 minutes.     Before the exam:    You will change into a gown.    Your medical history will be reviewed with you and you will be given a consent form to sign.     A nurse will insert an intravenous (IV) line into your hand or arm.  During the exam:     Medicine will be given through the IV line to help you relax and feel drowsy.     Your heart rate and oxygen levels will be monitored. If your blood pressure is low, you may be given fluids through the IV line.     The doctor  will insert a flexible hollow tube, called a colonoscope, into your rectum.   The scope will be advanced slowly through the large intestine (colon).    You may have a feeling of pressure or fullness.     If an abnormal tissue, or a polyp are found, the doctor may remove it through the endoscope for closer examination, or biopsy. Tissue removal is painless.  What happens after the exam?           The doctor will talk with you about the initial results of your exam.     The doctor will prepare a full report for the physician who referred you for the colonoscopy.     You may feel bloated after the procedure. This is normal.     Medication given during the exam will prohibit you from driving for the rest of the day.     Following the exam, you may resume your normal diet. Avoid alcohol until the next day.     You may resume your regular activities the day after the procedure.     A nurse will provide you with complete discharge instructions before you leave the endoscopy center. Be sure to ask the nurse for specific instructions if you take blood thinners such as aspirin, Coumadin or Plavix.     Any tissue samples removed during the exam will be sent to a lab for evaluation. It may take 5-7 working days for you to be notified of the results.     Miralax-Gatorade  If you have diabetes, ask your regular doctor for diet and medication restrictions.   If you take a medication to thin your blood, such as coumadin or warfarin, please call your primary care provider for directions on when to stop this medication.  If you take aspirin, you may continue to do so.  If you are or may be pregnant, please discuss the risks and benefits of this procedure with your doctor.  You must arrange for a ride for the day of your exam. If you fail to arrange transportation with a responsible adult (someone you know and trust) your procedure will need to be cancelled and rescheduled.  If you must cancel or reschedule your appointment, please  call [phone] as soon as possible.        PREPARATION  To ensure a successful exam, please follow all instructions carefully. Failure to accurately and completely prepare for your exam may result in the need for an additional procedure and both procedures will be billed to your insurance.     Purchase the following over-the-counter supplies at your local pharmacy:      ? 2 tablets bisacodyl, each containing 5 mg of bisacodyl (Dulcolax  laxative NOT Dulcolax  stool softener)   ? 1-8.3 oz. bottle Miralax  powder (238 grams)   ? 64 oz. Gatorade  liquid (NOT red; NOT powdered). Regular Gatorade , Gatorade G2 , Powerade  or  PoweradeZero  are acceptable.   ? 1-10 oz. bottle Magnesium Citrate (NOT red)  7 days before your exam:  Discontinue fiber supplements or medications containing iron. This includes multivitamins with iron, Metamucil  and Fibercon .    3 Days prior to your exam:  Stop eating all high-fiber foods and begin a Low-Fiber Diet. A low fiber diet helps make the cleanout more effective.     Examples of a Low Fiber Diet include:     White bread, white rice, pasta, potato without skin, plain crackers     Fish, white meat chicken, eggs, peanut butter without nuts     Clear beverages (apple juice, white grape juice, Sprite , sparkling water, Gatorade )     Cooked carrots, cooked green beans, cooked spinach        Milk, plain yogurt, cheese     Jelly, salt, pepper, sugar  Avoid: Raw fruits or vegetables, whole wheat or high fiber foods, seeds, nuts, popcorn, bran or bulking agents     2 days prior to your exam     Continue Low Fiber Diet.     Drink at least 8 (eight ounces) glasses of water throughout the day.     Refrigerate the Gatorade  or Powerade , if you wish to drink it cold.     Stop eating solid foods at 11:45 pm.    1 day prior to your exam  Start a Clear Liquid Diet   Examples of a Clear Liquid Diet:     No red liquids; No coffee; No alcohol; No dairy products     May drink clear caffeinated  beverages    Water: drink at least 8 glasses of water during the day     Tea (do not add milk or creamer)     Clear broth or bouillon     Gatorade , Pedialyte  or Powerade  (No red)     Carbonated and non-carbonated soft drinks (Sprite , 7-Up , Ginger ale)     Strained fruit juices without pulp (apple, white grape, white cranberry)     Jell-O , popsicles, hard candy (No red)    At 12 Noon:  Take the 2 bisacodyl (Dulcolax ) tablets    At 4 PM (no later than 6 PM)    Mix 1 bottle of Miralax  (8.3 oz) with 64 oz. of Gatorade  in a large pitcher.     Drink 1 - 8 oz. glass of the Miralax /Gatorade  solution.     Continue drinking 1 - 8 oz. glass every 15 minutes thereafter until the mixture is gone.     Continue clear liquid diet     Colon Cleansing Tips     If you experience nausea or vomiting while taking the prep, rinse your mouth with water,  or mouthwash , take a 15-30 minute break, and then continue taking the prep solution. If you are still unable to complete the prep, without severe nausea or vomiting, you will need to contact your primary care provider (the provider who ordered the test) for a possible anti-nausea medication. Or if you are prone to nausea you may want to ask your primary care provider to prescribe an as needed anti-nausea medication that you may have on hand.    Chill the Miralax /Gatorade  solution in the refrigerator. DO NOT add ice to the solution or your drinking glass.      Set a timer for every 15 minutes. Drink each 8 - oz. glass of solution quickly to help flush your colon.     Stay near a toilet! You will have diarrhea.     Even if you are sitting on the toilet, continue to drink the cleansing solution every 15 minutes.     Drink all of the solution until it is gone.     You will be uncomfortable until the stool has flushed from your colon (in about 2-4 hours). You may feel chilled.     You may suck on a few hard candies (NO red).     Alcohol-free baby wipes or Vaseline  may help ease  skin irritation.     Over-the-counter hydrocortisone creams, hemorrhoid treatments or Tucks may be used if desired.    The day of your exam:            Continue clear liquid diet. Do not eat solid foods.     You may take all of your morning medications.    4 hours before your procedure:     Drink 10 oz. of Magnesium Citrate.    2 hours before your procedure:     Stop drinking clear liquids.     Allow extra time to travel to your procedure as you may need to stop and use a restroom along the way.  You are ready for the exam, if you followed all instructions and your stool is no longer formed, but clear or yellow liquid. If you are unsure whether your colon is clean, please call our department at [phone] before you leave for your appointment.      Bring a list of all of your current medications, including any allergy or over-the-counter medications.      Bring a photo ID, as well as up-to-date insurance information, such as your insurance card and any referral forms that might be required by your insurance company.  DIRECTIONS  From the north ([facility] [place])  Take [address] south, exit to [place] [address]. Get into the left hand ema, turn left (east), go one-half mile to [address]. Turn left (north) on [address]. Go north to first stoplight, take a right on the newly constructed [address] and follow it to the Emergency entrance.  From the south ([facility])  Take 35 north to the east split, [address], and exit to [place] [address]. Turn left (west) on [address] [address] to [address]. Turn right (north) on [address]. Go north to first stoplight, take a right on the newly [address] and follow it to the Emergency entrance.    From the east via 35[address] ([facility])  Take [address] south to [address] [place] [address] exit. Turn right on [address] [address]. Go west to [address]. Turn right (north) on [address], go to the first stoplight, take a right and  follow the newly constructed road, [address], to the Emergency entrance.    From the east via [address] ([facility])  Take [address] west to [address]. Turn left (south) on [address] to [address]. Turn left (east) on [address] and follow the newly constructed road to the Emergency entrance.    From the west via [address] ([place])  Take [address] east to [address]. Turn right (south) on [address] to [address].  Turn left (east) on [address] and follow the newly constructed road to the Emergency entrance.  Cut along line  -------------------------------------------------------------------------------------------------------------------  SHOPPING LIST FOR OVER-THE-COUNTER COLONOSCOPY PREP:  ? 2 tablets bisacodyl, each containing 5 mg of bisacodyl (Dulcolax  laxative                     NOT Dulcolax  stool softener)   ? 1-8.3 oz. bottle Miralax  powder (238 grams)   ? 64 oz. Gatorade  liquid (NOT red; NOT powdered). Regular Gatorade ,                     Gatorade G2 , Powerade  or  PoweradeZero  are acceptable.   ? 1-10 oz. bottle Magnesium Citrate (NOT red)               x1/nuchal cord

## 2021-06-21 ENCOUNTER — MYC MEDICAL ADVICE (OUTPATIENT)
Dept: INTERNAL MEDICINE | Facility: CLINIC | Age: 76
End: 2021-06-21

## 2021-06-21 DIAGNOSIS — H91.93 BILATERAL HEARING LOSS, UNSPECIFIED HEARING LOSS TYPE: Primary | ICD-10-CM

## 2021-06-23 ENCOUNTER — OFFICE VISIT (OUTPATIENT)
Dept: AUDIOLOGY | Facility: CLINIC | Age: 76
End: 2021-06-23
Payer: COMMERCIAL

## 2021-06-23 DIAGNOSIS — H90.3 SENSORY HEARING LOSS, BILATERAL: Primary | ICD-10-CM

## 2021-06-23 DIAGNOSIS — H91.93 BILATERAL HEARING LOSS, UNSPECIFIED HEARING LOSS TYPE: ICD-10-CM

## 2021-06-23 PROCEDURE — 92557 COMPREHENSIVE HEARING TEST: CPT | Performed by: AUDIOLOGIST

## 2021-06-23 PROCEDURE — 92550 TYMPANOMETRY & REFLEX THRESH: CPT | Performed by: AUDIOLOGIST

## 2021-06-23 NOTE — PROGRESS NOTES
AUDIOLOGY REPORT    SUBJECTIVE:  Lisa Lopez is a 75 year old female who was seen in the Audiology Clinic at the [facility] for audiologic evaluation, referred by Mireille Hernandes M.D. .The patient has been seen previously in this clinic on 07/20/20 for assessment and results indicated a normal sloping to profound sensorineural hearing loss bilaterally. The patient reports no change in hearing. She denies pain, drainage, dizziness and tinnitus today. She currently wears [employer] Pure Charge and Go 3NX fit on 1/21/2019.     OBJECTIVE:  Abuse Screening:  Do you feel unsafe at home or work/school? No  Do you feel threatened by someone? No  Does anyone try to keep you from having contact with others, or doing things outside of your home? No  Physical signs of abuse present? No     Fall Risk Screen:  1. Have you fallen two or more times in the past year? No  2. Have you fallen and had an injury in the past year? No    Otoscopic exam indicates ears are clear of cerumen bilaterally     Pure Tone Thresholds assessed using conventional audiometry with good  reliability from 250-8000 Hz bilaterally using insert earphones and circumaural headphones     RIGHT:  normal sloping to profound sensorineural hearing loss    LEFT:    normal sloping to profound sensorineural hearing loss    Tympanogram:    RIGHT: normal eardrum mobility    LEFT:   normal eardrum mobility    Reflexes (reported by stimulus ear):  RIGHT: Ipsilateral is present at normal levels  RIGHT: Contralateral is present at normal levels  LEFT:   Ipsilateral is present at normal levels  LEFT:   Contralateral is present at normal levels      Speech Reception Threshold:    RIGHT: 25 dB HL    LEFT:   35 dB HL  Word Recognition Score:     RIGHT: 100% at 70 dB HL using NU-6 recorded word list.    LEFT:   96% at 70 dB HL using NU-6 recorded word list.    Hearing aids are cleaned and checked. Patients hearing aids are in good  working condition via a biologic listening check. Her main concern is she does feel they fit correctly. She thinks they move and fall out to easily. It is noted that there is a slight gap in tubing and it looks as if they could go in further. Different domes are tried. The 6 mm closed dome allows the dome to fit further into ear canal. Patient reports this feels more secure, as she can hear better, and she does not feel as if it will fall out. No charge for the in warranty hearing aid check.     ASSESSMENT:   Normal sloping to profound sensorineural hearing loss bilaterally.Compared to patient's previous audiogram dated 07/20/20, hearing has remained stable bilaterally. Hearing aid check completed. Today s results were discussed with the patient in detail.     PLAN:  Patient was counseled regarding hearing loss and impact on communication.     It is recommended that the patient for annual hearing aid checks.  Please call this clinic with questions regarding these results or recommendations.        Bill Cardenas, [facility]  Licensed Audiologist  [place] #[id number]

## 2021-08-16 ENCOUNTER — MYC MEDICAL ADVICE (OUTPATIENT)
Dept: INTERNAL MEDICINE | Facility: CLINIC | Age: 76
End: 2021-08-16

## 2021-08-16 DIAGNOSIS — F34.1 DYSTHYMIA: ICD-10-CM

## 2021-08-17 RX ORDER — LORAZEPAM 0.5 MG/1
0.25 TABLET ORAL DAILY
Qty: 45 TABLET | Refills: 1 | Status: SHIPPED | OUTPATIENT
Start: 2021-08-17 | End: 2022-01-06

## 2021-08-17 NOTE — TELEPHONE ENCOUNTER
Routing refill request to provider for review/approval because:  Drug not on the [facility] refill protocol   Last ov 5/17/21

## 2021-09-25 ENCOUNTER — HEALTH MAINTENANCE LETTER (OUTPATIENT)
Age: 76
End: 2021-09-25

## 2021-10-22 ENCOUNTER — MYC MEDICAL ADVICE (OUTPATIENT)
Dept: INTERNAL MEDICINE | Facility: CLINIC | Age: 76
End: 2021-10-22

## 2021-11-15 ENCOUNTER — OFFICE VISIT (OUTPATIENT)
Dept: INTERNAL MEDICINE | Facility: CLINIC | Age: 76
End: 2021-11-15
Payer: COMMERCIAL

## 2021-11-15 ENCOUNTER — ANCILLARY PROCEDURE (OUTPATIENT)
Dept: GENERAL RADIOLOGY | Facility: CLINIC | Age: 76
End: 2021-11-15
Attending: PHYSICIAN ASSISTANT
Payer: COMMERCIAL

## 2021-11-15 VITALS
HEART RATE: 74 BPM | OXYGEN SATURATION: 97 % | HEIGHT: 66 IN | TEMPERATURE: 97 F | SYSTOLIC BLOOD PRESSURE: 124 MMHG | DIASTOLIC BLOOD PRESSURE: 64 MMHG | BODY MASS INDEX: 21.38 KG/M2 | WEIGHT: 133 LBS | RESPIRATION RATE: 16 BRPM

## 2021-11-15 DIAGNOSIS — R19.7 DIARRHEA, UNSPECIFIED TYPE: ICD-10-CM

## 2021-11-15 DIAGNOSIS — R19.7 DIARRHEA, UNSPECIFIED TYPE: Primary | ICD-10-CM

## 2021-11-15 DIAGNOSIS — Z23 NEED FOR PROPHYLACTIC VACCINATION AND INOCULATION AGAINST INFLUENZA: ICD-10-CM

## 2021-11-15 DIAGNOSIS — E03.8 SUBCLINICAL HYPOTHYROIDISM: ICD-10-CM

## 2021-11-15 LAB
ERYTHROCYTE [DISTWIDTH] IN BLOOD BY AUTOMATED COUNT: 13.1 % (ref 10–15)
ERYTHROCYTE [SEDIMENTATION RATE] IN BLOOD BY WESTERGREN METHOD: 18 MM/HR (ref 0–30)
HCT VFR BLD AUTO: 42.7 % (ref 35–47)
HGB BLD-MCNC: 14.2 G/DL (ref 11.7–15.7)
MCH RBC QN AUTO: 30.9 PG (ref 26.5–33)
MCHC RBC AUTO-ENTMCNC: 33.3 G/DL (ref 31.5–36.5)
MCV RBC AUTO: 93 FL (ref 78–100)
PLATELET # BLD AUTO: 357 10E3/UL (ref 150–450)
RBC # BLD AUTO: 4.6 10E6/UL (ref 3.8–5.2)
WBC # BLD AUTO: 12.6 10E3/UL (ref 4–11)

## 2021-11-15 PROCEDURE — G0008 ADMIN INFLUENZA VIRUS VAC: HCPCS | Performed by: PHYSICIAN ASSISTANT

## 2021-11-15 PROCEDURE — 99214 OFFICE O/P EST MOD 30 MIN: CPT | Mod: 25 | Performed by: PHYSICIAN ASSISTANT

## 2021-11-15 PROCEDURE — 36415 COLL VENOUS BLD VENIPUNCTURE: CPT

## 2021-11-15 PROCEDURE — 74019 RADEX ABDOMEN 2 VIEWS: CPT | Performed by: RADIOLOGY

## 2021-11-15 PROCEDURE — 90662 IIV NO PRSV INCREASED AG IM: CPT | Performed by: PHYSICIAN ASSISTANT

## 2021-11-15 PROCEDURE — 83690 ASSAY OF LIPASE: CPT

## 2021-11-15 PROCEDURE — 85027 COMPLETE CBC AUTOMATED: CPT

## 2021-11-15 PROCEDURE — 84443 ASSAY THYROID STIM HORMONE: CPT

## 2021-11-15 PROCEDURE — 80048 BASIC METABOLIC PNL TOTAL CA: CPT

## 2021-11-15 PROCEDURE — 85652 RBC SED RATE AUTOMATED: CPT

## 2021-11-15 ASSESSMENT — MIFFLIN-ST. JEOR: SCORE: 1092.09

## 2021-11-15 NOTE — PROGRESS NOTES
Assessment & Plan     Diarrhea, unspecified type  XR and labs today. Discussed possibility of GI referral for further evaluation. Differential is broad. Discussed that IBS is a diagnosis of exclusion.   - Basic metabolic panel  (Ca, Cl, CO2, Creat, Gluc, K, Na, BUN); Future  - CBC with platelets; Future  - ESR: Erythrocyte sedimentation rate; Future  - XR Abdomen 2 Views; Future  - Lipase; Future    Subclinical hypothyroidism  Recheck TSH today.  - TSH with free T4 reflex; Future    Need for prophylactic vaccination and inoculation against influenza  - INFLUENZA, QUAD, HIGH DOSE, PF, 65YR + (FLUZONE HD)  - ADMIN INFLUENZA (For MEDICARE Patients ONLY) []    35 minutes spent on the date of the encounter doing chart review, history and exam, documentation and further activities per the note      No follow-ups on file.    THONG Montesinos [facility] BETO Gonzalez is a 76 year old who presents for the following health issues    HPI     Diarrhea for one year and getting worse.  Sometimes wakes from sleep to have a BM.  Cramping before BMs.  Stools long and thin. Also has liquid stools.  Feels urgency, then small droplets of liquid.  Feels like she has to have a large BM, but not much comes out.  Denies any constipation episodes.  Sometimes symptoms will improve for a couple of weeks, and then it worsens again    Can't identify any triggers, including food triggers    Hasn't been eating due to symptoms. Has lost 5-6 lbs.    Sometimes gets gas/bloating    No nausea/vomiting    Takes Imodium which slows it down    H/o pancreatitis in 2001. Thought it was food poisoning. Rare alcohol use.    Tubal ligation and vaginal hyst. Possible appendectomy?    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 No dysplasia or malignancy    Review of Systems   Constitutional, HEENT,  "cardiovascular, pulmonary, gi and gu systems are negative, except as otherwise noted.      Objective    /64 (BP Location: Right arm, Patient Position: Chair, Cuff Size: Adult Regular)   Pulse 74   Temp 97  F (36.1  C) (Oral)   Resp 16   Ht 1.664 m (5' 5.5\")   Wt 60.3 kg (133 lb)   LMP  (LMP Unknown)   SpO2 97%   Breastfeeding No   BMI 21.80 kg/m    Body mass index is 21.8 kg/m .  Physical Exam   GENERAL: healthy, alert and no distress  RESP: lungs clear to auscultation - no rales, rhonchi or wheezes  CV: regular rate and rhythm, normal S1 S2, no S3 or S4, no murmur, click or rub, no peripheral edema and peripheral pulses strong  ABDOMEN: soft, nontender, no hepatosplenomegaly, no masses and bowel sounds normal  MS: no gross musculoskeletal defects noted, no edema  SKIN: no suspicious lesions or rashes  PSYCH: mentation appears normal, affect normal/bright    Xray - Reviewed and interpreted by me.  No evidence of fecal impaction. Normal bowel gas pattern.  No results found for this or any previous visit (from the past 24 hour(s)).            "

## 2021-11-16 LAB
ANION GAP SERPL CALCULATED.3IONS-SCNC: 5 MMOL/L (ref 3–14)
BUN SERPL-MCNC: 16 MG/DL (ref 7–30)
CALCIUM SERPL-MCNC: 9.5 MG/DL (ref 8.5–10.1)
CHLORIDE BLD-SCNC: 110 MMOL/L (ref 94–109)
CO2 SERPL-SCNC: 25 MMOL/L (ref 20–32)
CREAT SERPL-MCNC: 0.76 MG/DL (ref 0.52–1.04)
GFR SERPL CREATININE-BSD FRML MDRD: 75 ML/MIN/1.73M2
GLUCOSE BLD-MCNC: 83 MG/DL (ref 70–99)
LIPASE SERPL-CCNC: 81 U/L (ref 73–393)
POTASSIUM BLD-SCNC: 4.5 MMOL/L (ref 3.4–5.3)
SODIUM SERPL-SCNC: 140 MMOL/L (ref 133–144)
TSH SERPL DL<=0.005 MIU/L-ACNC: 3.36 MU/L (ref 0.4–4)

## 2021-11-17 ENCOUNTER — MYC MEDICAL ADVICE (OUTPATIENT)
Dept: INTERNAL MEDICINE | Facility: CLINIC | Age: 76
End: 2021-11-17
Payer: COMMERCIAL

## 2021-11-17 DIAGNOSIS — R19.7 DIARRHEA, UNSPECIFIED TYPE: Primary | ICD-10-CM

## 2021-11-18 ENCOUNTER — TRANSFERRED RECORDS (OUTPATIENT)
Dept: HEALTH INFORMATION MANAGEMENT | Facility: CLINIC | Age: 76
End: 2021-11-18
Payer: COMMERCIAL

## 2021-11-24 ENCOUNTER — TRANSFERRED RECORDS (OUTPATIENT)
Dept: HEALTH INFORMATION MANAGEMENT | Facility: CLINIC | Age: 76
End: 2021-11-24
Payer: COMMERCIAL

## 2021-12-02 ENCOUNTER — TRANSFERRED RECORDS (OUTPATIENT)
Dept: HEALTH INFORMATION MANAGEMENT | Facility: CLINIC | Age: 76
End: 2021-12-02
Payer: COMMERCIAL

## 2021-12-16 ENCOUNTER — TRANSFERRED RECORDS (OUTPATIENT)
Dept: HEALTH INFORMATION MANAGEMENT | Facility: CLINIC | Age: 76
End: 2021-12-16
Payer: COMMERCIAL

## 2021-12-20 ENCOUNTER — OFFICE VISIT (OUTPATIENT)
Dept: INTERNAL MEDICINE | Facility: CLINIC | Age: 76
End: 2021-12-20
Payer: COMMERCIAL

## 2021-12-20 VITALS
RESPIRATION RATE: 16 BRPM | OXYGEN SATURATION: 98 % | WEIGHT: 129 LBS | DIASTOLIC BLOOD PRESSURE: 67 MMHG | SYSTOLIC BLOOD PRESSURE: 126 MMHG | BODY MASS INDEX: 20.73 KG/M2 | HEART RATE: 55 BPM | TEMPERATURE: 96.9 F | HEIGHT: 66 IN

## 2021-12-20 DIAGNOSIS — R00.2 PALPITATIONS: ICD-10-CM

## 2021-12-20 DIAGNOSIS — I10 ESSENTIAL HYPERTENSION, BENIGN: ICD-10-CM

## 2021-12-20 DIAGNOSIS — I47.10 SVT (SUPRAVENTRICULAR TACHYCARDIA) (H): ICD-10-CM

## 2021-12-20 DIAGNOSIS — A04.72 C. DIFFICILE COLITIS: Primary | ICD-10-CM

## 2021-12-20 PROCEDURE — 99213 OFFICE O/P EST LOW 20 MIN: CPT | Performed by: INTERNAL MEDICINE

## 2021-12-20 RX ORDER — CARVEDILOL 12.5 MG/1
6.25 TABLET ORAL 2 TIMES DAILY WITH MEALS
Qty: 90 TABLET | Refills: 1 | Status: SHIPPED | OUTPATIENT
Start: 2021-12-20 | End: 2022-06-23

## 2021-12-20 RX ORDER — CHOLECALCIFEROL (VITAMIN D3) 125 MCG
CAPSULE ORAL
COMMUNITY
Start: 2021-12-20

## 2021-12-20 RX ORDER — AMLODIPINE BESYLATE 5 MG/1
TABLET ORAL
Qty: 90 TABLET | Refills: 2 | Status: SHIPPED | OUTPATIENT
Start: 2021-12-20 | End: 2022-10-11

## 2021-12-20 ASSESSMENT — MIFFLIN-ST. JEOR: SCORE: 1073.95

## 2021-12-20 ASSESSMENT — PATIENT HEALTH QUESTIONNAIRE - PHQ9: SUM OF ALL RESPONSES TO PHQ QUESTIONS 1-9: 3

## 2021-12-20 NOTE — PROGRESS NOTES
"  Assessment & Plan     C. difficile colitis  Follow up lab pending, recommended for now to eat easily digestible food and only what apeals to her.     Essential hypertension, benign       Palpitations       SVT (supraventricular tachycardia) (H)              No follow-ups on file.    Soco Durham MD  [facility] BETO Gonzalez is a 76 year old who presents for the following health issues     HPI     Follow up c-diff.    In November she was diagnosed with C diff and treated with PO Vanco 125 mg. She is about 40% better but still having diarrhea. Her appetite is still poor. She has dropped about 10 lbs. Denies any fever chills or night sweats. But never did have them. Does have nausea in the morning. No blood in stool.     GI has sent of another C diff test but she has not heard the result yet.     Review of Systems   Constitutional, HEENT, cardiovascular, pulmonary, GI, , musculoskeletal, neuro, skin, endocrine and psych systems are negative, except as otherwise noted.      Objective    /67 (BP Location: Right arm, Patient Position: Chair, Cuff Size: Adult Large)   Pulse 55   Temp 96.9  F (36.1  C) (Oral)   Resp 16   Ht 1.664 m (5' 5.5\")   Wt 58.5 kg (129 lb)   LMP  (LMP Unknown)   SpO2 98%   BMI 21.14 kg/m    Body mass index is 21.14 kg/m .  Physical Exam   GENERAL: healthy, alert and no distress  NECK: no adenopathy, no asymmetry, masses, or scars and thyroid normal to palpation  RESP: lungs clear to auscultation - no rales, rhonchi or wheezes  CV: regular rate and rhythm, normal S1 S2, no S3 or S4, no murmur, click or rub, no peripheral edema and peripheral pulses strong  ABDOMEN: soft, nontender, no hepatosplenomegaly, no masses and bowel sounds normal  MS: no gross musculoskeletal defects noted, no edema          "

## 2021-12-27 ENCOUNTER — MYC MEDICAL ADVICE (OUTPATIENT)
Dept: INTERNAL MEDICINE | Facility: CLINIC | Age: 76
End: 2021-12-27
Payer: COMMERCIAL

## 2021-12-27 ENCOUNTER — LAB (OUTPATIENT)
Dept: LAB | Facility: CLINIC | Age: 76
End: 2021-12-27
Payer: COMMERCIAL

## 2021-12-27 DIAGNOSIS — R30.0 DYSURIA: ICD-10-CM

## 2021-12-27 DIAGNOSIS — A04.72 C. DIFFICILE COLITIS: Primary | ICD-10-CM

## 2021-12-27 DIAGNOSIS — R30.0 DYSURIA: Primary | ICD-10-CM

## 2021-12-27 LAB
ALBUMIN UR-MCNC: >=300 MG/DL
APPEARANCE UR: ABNORMAL
BACTERIA #/AREA URNS HPF: ABNORMAL /HPF
BILIRUB UR QL STRIP: ABNORMAL
COLOR UR AUTO: ABNORMAL
GLUCOSE UR STRIP-MCNC: 250 MG/DL
HGB UR QL STRIP: ABNORMAL
KETONES UR STRIP-MCNC: 15 MG/DL
LEUKOCYTE ESTERASE UR QL STRIP: ABNORMAL
NITRATE UR QL: POSITIVE
PH UR STRIP: 5 [PH] (ref 5–7)
RBC #/AREA URNS AUTO: ABNORMAL /HPF
SP GR UR STRIP: 1.01 (ref 1–1.03)
UROBILINOGEN UR STRIP-ACNC: >=8 E.U./DL
WBC #/AREA URNS AUTO: ABNORMAL /HPF

## 2021-12-27 PROCEDURE — 81001 URINALYSIS AUTO W/SCOPE: CPT

## 2021-12-27 PROCEDURE — 87086 URINE CULTURE/COLONY COUNT: CPT

## 2021-12-27 PROCEDURE — 87186 SC STD MICRODIL/AGAR DIL: CPT

## 2021-12-28 ENCOUNTER — MYC MEDICAL ADVICE (OUTPATIENT)
Dept: INTERNAL MEDICINE | Facility: CLINIC | Age: 76
End: 2021-12-28
Payer: COMMERCIAL

## 2021-12-29 LAB — BACTERIA UR CULT: ABNORMAL

## 2021-12-29 RX ORDER — VANCOMYCIN HYDROCHLORIDE 125 MG/1
125 CAPSULE ORAL 4 TIMES DAILY
Qty: 28 CAPSULE | Refills: 0 | Status: SHIPPED | OUTPATIENT
Start: 2021-12-29 | End: 2022-02-08

## 2021-12-29 RX ORDER — SULFAMETHOXAZOLE/TRIMETHOPRIM 800-160 MG
1 TABLET ORAL 2 TIMES DAILY
Qty: 14 TABLET | Refills: 0 | Status: SHIPPED | OUTPATIENT
Start: 2021-12-29 | End: 2022-02-08

## 2022-01-03 ENCOUNTER — HOSPITAL ENCOUNTER (OUTPATIENT)
Dept: MAMMOGRAPHY | Facility: CLINIC | Age: 77
Discharge: HOME | End: 2022-01-03
Attending: INTERNAL MEDICINE | Admitting: INTERNAL MEDICINE
Payer: COMMERCIAL

## 2022-01-03 DIAGNOSIS — Z12.31 VISIT FOR SCREENING MAMMOGRAM: ICD-10-CM

## 2022-01-03 PROCEDURE — 77067 SCR MAMMO BI INCL CAD: CPT

## 2022-01-05 DIAGNOSIS — F34.1 DYSTHYMIA: ICD-10-CM

## 2022-01-05 NOTE — TELEPHONE ENCOUNTER
Routing refill request to provider for review/approval because:  Drug not on the [facility] refill protocol     Pending Prescriptions:                       Disp   Refills    LORazepam (ATIVAN) 0.5 MG tablet           45 tab*1        Sig: Take 0.5 tablets (0.25 mg) by mouth daily

## 2022-01-06 RX ORDER — LORAZEPAM 0.5 MG/1
0.25 TABLET ORAL DAILY
Qty: 45 TABLET | Refills: 1 | Status: SHIPPED | OUTPATIENT
Start: 2022-01-06 | End: 2022-02-08

## 2022-01-25 DIAGNOSIS — Z86.19 HISTORY OF CLOSTRIDIOIDES DIFFICILE INFECTION: ICD-10-CM

## 2022-01-25 DIAGNOSIS — R19.8 ALTERED BOWEL FUNCTION: ICD-10-CM

## 2022-01-27 LAB — C DIFF TOX B STL QL: NEGATIVE

## 2022-01-27 PROCEDURE — 87493 C DIFF AMPLIFIED PROBE: CPT

## 2022-02-04 ENCOUNTER — TRANSFERRED RECORDS (OUTPATIENT)
Dept: HEALTH INFORMATION MANAGEMENT | Facility: CLINIC | Age: 77
End: 2022-02-04
Payer: COMMERCIAL

## 2022-02-05 ASSESSMENT — ENCOUNTER SYMPTOMS
MYALGIAS: 0
COUGH: 0
DYSURIA: 0
DIARRHEA: 0
PALPITATIONS: 0
NERVOUS/ANXIOUS: 0
ABDOMINAL PAIN: 0
DIZZINESS: 0
BREAST MASS: 0
FEVER: 0
CHILLS: 0
PARESTHESIAS: 0
HEARTBURN: 0
HEMATOCHEZIA: 0
NAUSEA: 0
JOINT SWELLING: 0
HEADACHES: 0
HEMATURIA: 0
FREQUENCY: 1
SHORTNESS OF BREATH: 0
SORE THROAT: 0
ARTHRALGIAS: 0
WEAKNESS: 0
CONSTIPATION: 0
EYE PAIN: 0

## 2022-02-05 ASSESSMENT — ACTIVITIES OF DAILY LIVING (ADL): CURRENT_FUNCTION: NO ASSISTANCE NEEDED

## 2022-02-08 ENCOUNTER — OFFICE VISIT (OUTPATIENT)
Dept: INTERNAL MEDICINE | Facility: CLINIC | Age: 77
End: 2022-02-08
Payer: COMMERCIAL

## 2022-02-08 VITALS
OXYGEN SATURATION: 98 % | HEART RATE: 69 BPM | WEIGHT: 128.8 LBS | DIASTOLIC BLOOD PRESSURE: 62 MMHG | SYSTOLIC BLOOD PRESSURE: 130 MMHG | HEIGHT: 66 IN | TEMPERATURE: 97.6 F | BODY MASS INDEX: 20.7 KG/M2 | RESPIRATION RATE: 16 BRPM

## 2022-02-08 DIAGNOSIS — I47.10 SVT (SUPRAVENTRICULAR TACHYCARDIA) (H): ICD-10-CM

## 2022-02-08 DIAGNOSIS — E78.00 HYPERCHOLESTEREMIA: ICD-10-CM

## 2022-02-08 DIAGNOSIS — Z23 NEED FOR VACCINATION: ICD-10-CM

## 2022-02-08 DIAGNOSIS — Z78.0 ASYMPTOMATIC MENOPAUSAL STATE: ICD-10-CM

## 2022-02-08 DIAGNOSIS — Z00.00 PREVENTATIVE HEALTH CARE: Primary | ICD-10-CM

## 2022-02-08 DIAGNOSIS — I10 ESSENTIAL HYPERTENSION, BENIGN: ICD-10-CM

## 2022-02-08 DIAGNOSIS — F34.1 DYSTHYMIA: ICD-10-CM

## 2022-02-08 DIAGNOSIS — Z00.00 PREVENTATIVE HEALTH CARE: ICD-10-CM

## 2022-02-08 LAB
BASOPHILS # BLD AUTO: 0 10E3/UL (ref 0–0.2)
BASOPHILS NFR BLD AUTO: 0 %
EOSINOPHIL # BLD AUTO: 0.2 10E3/UL (ref 0–0.7)
EOSINOPHIL NFR BLD AUTO: 2 %
ERYTHROCYTE [DISTWIDTH] IN BLOOD BY AUTOMATED COUNT: 12.9 % (ref 10–15)
HCT VFR BLD AUTO: 43.2 % (ref 35–47)
HGB BLD-MCNC: 14.1 G/DL (ref 11.7–15.7)
LYMPHOCYTES # BLD AUTO: 2.5 10E3/UL (ref 0.8–5.3)
LYMPHOCYTES NFR BLD AUTO: 34 %
MCH RBC QN AUTO: 30.5 PG (ref 26.5–33)
MCHC RBC AUTO-ENTMCNC: 32.6 G/DL (ref 31.5–36.5)
MCV RBC AUTO: 93 FL (ref 78–100)
MONOCYTES # BLD AUTO: 0.6 10E3/UL (ref 0–1.3)
MONOCYTES NFR BLD AUTO: 7 %
NEUTROPHILS # BLD AUTO: 4.3 10E3/UL (ref 1.6–8.3)
NEUTROPHILS NFR BLD AUTO: 57 %
PLATELET # BLD AUTO: 305 10E3/UL (ref 150–450)
RBC # BLD AUTO: 4.63 10E6/UL (ref 3.8–5.2)
WBC # BLD AUTO: 7.5 10E3/UL (ref 4–11)

## 2022-02-08 PROCEDURE — 80061 LIPID PANEL: CPT

## 2022-02-08 PROCEDURE — 85025 COMPLETE CBC W/AUTO DIFF WBC: CPT | Performed by: INTERNAL MEDICINE

## 2022-02-08 PROCEDURE — 90471 IMMUNIZATION ADMIN: CPT | Performed by: INTERNAL MEDICINE

## 2022-02-08 PROCEDURE — 84439 ASSAY OF FREE THYROXINE: CPT

## 2022-02-08 PROCEDURE — 90714 TD VACC NO PRESV 7 YRS+ IM: CPT | Performed by: INTERNAL MEDICINE

## 2022-02-08 PROCEDURE — 36415 COLL VENOUS BLD VENIPUNCTURE: CPT | Performed by: INTERNAL MEDICINE

## 2022-02-08 PROCEDURE — 80053 COMPREHEN METABOLIC PANEL: CPT

## 2022-02-08 PROCEDURE — 84443 ASSAY THYROID STIM HORMONE: CPT

## 2022-02-08 PROCEDURE — 99397 PER PM REEVAL EST PAT 65+ YR: CPT | Mod: 25 | Performed by: INTERNAL MEDICINE

## 2022-02-08 RX ORDER — LORAZEPAM 0.5 MG/1
0.25 TABLET ORAL DAILY
Qty: 45 TABLET | Refills: 1 | Status: SHIPPED | OUTPATIENT
Start: 2022-02-08 | End: 2022-08-04

## 2022-02-08 ASSESSMENT — ENCOUNTER SYMPTOMS
DYSURIA: 0
HEARTBURN: 0
SHORTNESS OF BREATH: 0
EYE PAIN: 0
SORE THROAT: 0
DIZZINESS: 0
JOINT SWELLING: 0
FEVER: 0
DIARRHEA: 0
BREAST MASS: 0
HEMATURIA: 0
NERVOUS/ANXIOUS: 0
MYALGIAS: 0
PARESTHESIAS: 0
PALPITATIONS: 0
HEMATOCHEZIA: 0
ARTHRALGIAS: 0
ABDOMINAL PAIN: 0
FREQUENCY: 1
HEADACHES: 0
NAUSEA: 0
WEAKNESS: 0
COUGH: 0
CHILLS: 0
CONSTIPATION: 0

## 2022-02-08 ASSESSMENT — ACTIVITIES OF DAILY LIVING (ADL): CURRENT_FUNCTION: NO ASSISTANCE NEEDED

## 2022-02-08 ASSESSMENT — MIFFLIN-ST. JEOR: SCORE: 1073.04

## 2022-02-08 NOTE — PROGRESS NOTES
"SUBJECTIVE:   Lisa Lopez is a 76 year old female who presents for Preventive Visit.      Patient has been advised of split billing requirements and indicates understanding: Yes  Are you in the first 12 months of your Medicare coverage?  No    Healthy Habits:     In general, how would you rate your overall health?  Good    Frequency of exercise:  2-3 days/week    Do you usually eat at least 4 servings of fruit and vegetables a day, include whole grains    & fiber and avoid regularly eating high fat or \"junk\" foods?  Yes    Taking medications regularly:  Yes    Medication side effects:  None    Ability to successfully perform activities of daily living:  No assistance needed    Home Safety:  No safety concerns identified    Hearing Impairment:  No hearing concerns    In the past 6 months, have you been bothered by leaking of urine?  No    In general, how would you rate your overall mental or emotional health?  Good      PHQ-2 Total Score: 1    Additional concerns today:  Yes    Do you feel safe in your environment? Yes    Have you ever done Advance Care Planning? (For example, a Health Directive, POLST, or a discussion with a medical provider or your loved ones about your wishes): Yes, advance care planning is on file.       Fall risk       Cognitive Screening   1) Repeat 3 items (Leader, Season, Table)    2) Clock draw: NORMAL  3) 3 item recall: Recalls 3 objects  Results: 3 items recalled: COGNITIVE IMPAIRMENT LESS LIKELY    Mini-CogTM Copyright TOMASA Wiley. Licensed by the author for use in [facility]; reprinted with permission (georgi@.[facility]). All rights reserved.      Do you have sleep apnea, excessive snoring or daytime drowsiness?: no    Reviewed and updated as needed this visit by clinical staff  Tobacco  Allergies  Meds   Med Hx  Surg Hx  Fam Hx  Soc Hx       Reviewed and updated as needed this visit by Provider               Social History     Tobacco Use     Smoking status: Former " Smoker     Years: 5.00     Types: Cigarettes     Start date: 1963     Quit date: 1968     Years since quittin.0     Smokeless tobacco: Never Used     Tobacco comment: Smoked 24-29 yo.   Substance Use Topics     Alcohol use: Yes     Alcohol/week: 0.0 standard drinks     Comment: 1-2 glasses of wine or beer a week.     If you drink alcohol do you typically have >3 drinks per day or >7 drinks per week? No    Alcohol Use 2022   Prescreen: >3 drinks/day or >7 drinks/week? No   Prescreen: >3 drinks/day or >7 drinks/week? -         Current providers sharing in care for this patient include:   Patient Care Team:  Soco Durham MD as PCP - General (Internal Medicine)  Soco Durham MD as Assigned PCP  Laurence Mcintyre DO as Assigned Heart and Vascular Provider    The following health maintenance items are reviewed in Epic and correct as of today:  Health Maintenance Due   Topic Date Due     ANNUAL REVIEW OF  ORDERS  Never done     HEPATITIS C SCREENING  Never done     DTAP/TDAP/TD IMMUNIZATION (2 - Td or Tdap) 2021     FALL RISK ASSESSMENT  2022     MEDICARE ANNUAL WELLNESS VISIT  2022     BP Readings from Last 3 Encounters:   22 130/62   21 126/67   11/15/21 124/64    Wt Readings from Last 3 Encounters:   22 58.4 kg (128 lb 12.8 oz)   21 58.5 kg (129 lb)   11/15/21 60.3 kg (133 lb)                 Mammogram Screening - Patient over age 75, has elected to continue with screening.  Pertinent mammograms are reviewed under the imaging tab.    Review of Systems   Constitutional: Negative for chills and fever.   HENT: Negative for congestion, ear pain, hearing loss and sore throat.    Eyes: Negative for pain and visual disturbance.   Respiratory: Negative for cough and shortness of breath.    Cardiovascular: Negative for chest pain, palpitations and peripheral edema.   Gastrointestinal: Negative for abdominal pain, constipation, diarrhea,  "heartburn, hematochezia and nausea.   Breasts:  Negative for tenderness, breast mass and discharge.   Genitourinary: Positive for frequency. Negative for dysuria, genital sores, hematuria, pelvic pain, urgency, vaginal bleeding and vaginal discharge.   Musculoskeletal: Negative for arthralgias, joint swelling and myalgias.   Skin: Negative for rash.   Neurological: Negative for dizziness, weakness, headaches and paresthesias.   Psychiatric/Behavioral: Negative for mood changes. The patient is not nervous/anxious.        OBJECTIVE:   /62   Pulse 69   Temp 97.6  F (36.4  C) (Tympanic)   Resp 16   Ht 1.664 m (5' 5.5\")   Wt 58.4 kg (128 lb 12.8 oz)   LMP  (LMP Unknown)   SpO2 98%   BMI 21.11 kg/m   Estimated body mass index is 21.11 kg/m  as calculated from the following:    Height as of this encounter: 1.664 m (5' 5.5\").    Weight as of this encounter: 58.4 kg (128 lb 12.8 oz).  Physical Exam  GENERAL: healthy, alert and no distress  EYES: Eyes grossly normal to inspection, PERRL and conjunctivae and sclerae normal  NECK: no adenopathy, no asymmetry, masses, or scars and thyroid normal to palpation  RESP: lungs clear to auscultation - no rales, rhonchi or wheezes  CV: regular rate and rhythm, normal S1 S2, no S3 or S4, no murmur, click or rub, no peripheral edema and peripheral pulses strong  ABDOMEN: soft, nontender, no hepatosplenomegaly, no masses and bowel sounds normal  MS: no gross musculoskeletal defects noted, no edema  SKIN: no suspicious lesions or rashes  NEURO: Normal strength and tone, mentation intact and speech normal  PSYCH: mentation appears normal, affect normal/bright      ASSESSMENT / PLAN:   (Z00.00) Preventative health care  (primary encounter diagnosis)  Comment:    Plan: DX Hip/Pelvis/Spine, TSH with free T4 reflex,         Lipid Profile (Chol, Trig, HDL, LDL calc),         Comprehensive metabolic panel (BMP + Alb, Alk         Phos, ALT, AST, Total. Bili, TP), CBC with         " "platelets and differential             (F34.1) Dysthymia  Comment: under good control   Plan: LORazepam (ATIVAN) 0.5 MG tablet             (Z23) Need for vaccination  Comment:    Plan: TD PRSERV FREE >=7 YRS ADS IM [[id number]]             (I10) Essential hypertension, benign  Comment: under good control   Plan: TSH with free T4 reflex, Lipid Profile (Chol,         Trig, HDL, LDL calc), Comprehensive metabolic         panel (BMP + Alb, Alk Phos, ALT, AST, Total.         Bili, TP), CBC with platelets and differential             (E78.00) Hypercholesteremia  Comment: due for fasting lipid panel   Plan:      (Z78.0) Asymptomatic menopausal state   Comment:  Due for  Plan: DX Hip/Pelvis/Spine             (I47.1) SVT (supraventricular tachycardia) (H)  Comment: under good control   Plan:        COUNSELING:  Reviewed preventive health counseling, as reflected in patient instructions       Regular exercise       Healthy diet/nutrition    Estimated body mass index is 21.11 kg/m  as calculated from the following:    Height as of this encounter: 1.664 m (5' 5.5\").    Weight as of this encounter: 58.4 kg (128 lb 12.8 oz).        She reports that she quit smoking about 54 years ago. Her smoking use included cigarettes. She started smoking about 59 years ago. She quit after 5.00 years of use. She has never used smokeless tobacco.      Appropriate preventive services were discussed with this patient, including applicable screening as appropriate for cardiovascular disease, diabetes, osteopenia/osteoporosis, and glaucoma.  As appropriate for age/gender, discussed screening for colorectal cancer, prostate cancer, breast cancer, and cervical cancer. Checklist reviewing preventive services available has been given to the patient.    Reviewed patients plan of care and provided an AVS. The Basic Care Plan (routine screening as documented in Health Maintenance) for Lisa meets the Care Plan requirement. This Care Plan has been established " and reviewed with the Patient.    Counseling Resources:  ATP IV Guidelines  Pooled Cohorts Equation Calculator  Breast Cancer Risk Calculator  Breast Cancer: Medication to Reduce Risk  FRAX Risk Assessment  ICSI Preventive Guidelines  Dietary Guidelines for Americans, 2010  USDA's MyPlate  ASA Prophylaxis  Lung CA Screening    Soco Durham MD  [facility]    Identified Health Risks:

## 2022-02-08 NOTE — NURSING NOTE
"Wellness visit.  Vital signs:  Temp: 97.6  F (36.4  C) Temp src: Tympanic BP: 130/62 Pulse: 69   Resp: 16 SpO2: 98 %     Height: 166.4 cm (5' 5.5\") Weight: 58.4 kg (128 lb 12.8 oz)  Estimated body mass index is 21.11 kg/m  as calculated from the following:    Height as of this encounter: 1.664 m (5' 5.5\").    Weight as of this encounter: 58.4 kg (128 lb 12.8 oz).          "

## 2022-02-09 LAB
ALBUMIN SERPL-MCNC: 3.8 G/DL (ref 3.4–5)
ALP SERPL-CCNC: 70 U/L (ref 40–150)
ALT SERPL W P-5'-P-CCNC: 21 U/L (ref 0–50)
ANION GAP SERPL CALCULATED.3IONS-SCNC: 4 MMOL/L (ref 3–14)
AST SERPL W P-5'-P-CCNC: 18 U/L (ref 0–45)
BILIRUB SERPL-MCNC: 0.6 MG/DL (ref 0.2–1.3)
BUN SERPL-MCNC: 15 MG/DL (ref 7–30)
CALCIUM SERPL-MCNC: 9.5 MG/DL (ref 8.5–10.1)
CHLORIDE BLD-SCNC: 109 MMOL/L (ref 94–109)
CHOLEST SERPL-MCNC: 163 MG/DL
CO2 SERPL-SCNC: 25 MMOL/L (ref 20–32)
CREAT SERPL-MCNC: 0.69 MG/DL (ref 0.52–1.04)
FASTING STATUS PATIENT QL REPORTED: NORMAL
GFR SERPL CREATININE-BSD FRML MDRD: 88 ML/MIN/1.73M2
GLUCOSE BLD-MCNC: 93 MG/DL (ref 70–99)
HDLC SERPL-MCNC: 52 MG/DL
LDLC SERPL CALC-MCNC: 86 MG/DL
NONHDLC SERPL-MCNC: 111 MG/DL
POTASSIUM BLD-SCNC: 5 MMOL/L (ref 3.4–5.3)
PROT SERPL-MCNC: 7.7 G/DL (ref 6.8–8.8)
SODIUM SERPL-SCNC: 138 MMOL/L (ref 133–144)
T4 FREE SERPL-MCNC: 0.98 NG/DL (ref 0.76–1.46)
TRIGL SERPL-MCNC: 125 MG/DL
TSH SERPL DL<=0.005 MIU/L-ACNC: 4.12 MU/L (ref 0.4–4)

## 2022-02-10 ENCOUNTER — MYC MEDICAL ADVICE (OUTPATIENT)
Dept: INTERNAL MEDICINE | Facility: CLINIC | Age: 77
End: 2022-02-10
Payer: COMMERCIAL

## 2022-02-10 DIAGNOSIS — B37.31 YEAST INFECTION OF THE VAGINA: Primary | ICD-10-CM

## 2022-02-10 NOTE — TELEPHONE ENCOUNTER
Please see message below.    Next step?    Last office visit 2-8-22    Please advise, thanks.  Routed to covering provider(s).

## 2022-02-11 RX ORDER — FLUCONAZOLE 150 MG/1
TABLET ORAL
Qty: 5 TABLET | Refills: 0 | Status: SHIPPED | OUTPATIENT
Start: 2022-02-11 | End: 2023-05-25

## 2022-02-25 ENCOUNTER — E-VISIT (OUTPATIENT)
Dept: URGENT CARE | Facility: URGENT CARE | Age: 77
End: 2022-02-25
Payer: COMMERCIAL

## 2022-02-25 DIAGNOSIS — N39.0 ACUTE UTI (URINARY TRACT INFECTION): Primary | ICD-10-CM

## 2022-02-25 PROCEDURE — 99421 OL DIG E/M SVC 5-10 MIN: CPT | Performed by: FAMILY MEDICINE

## 2022-02-25 RX ORDER — NITROFURANTOIN 25; 75 MG/1; MG/1
100 CAPSULE ORAL 2 TIMES DAILY
Qty: 10 CAPSULE | Refills: 0 | Status: SHIPPED | OUTPATIENT
Start: 2022-02-25 | End: 2022-04-06

## 2022-02-25 NOTE — PATIENT INSTRUCTIONS
Dear Lisa Lopez    After reviewing your responses, I've been able to diagnose you with a urinary tract infection, which is a common infection of the bladder with bacteria.  This is not a sexually transmitted infection, though urinating immediately after intercourse can help prevent infections.  Drinking lots of fluids is also helpful to clear your current infection and prevent the next one.      I have sent a prescription for antibiotics to your pharmacy to treat this infection.    It is important that you take all of your prescribed medication even if your symptoms are improving after a few doses.  Taking all of your medicine helps prevent the symptoms from returning.     If your symptoms worsen, you develop pain in your back or stomach, develop fevers, or are not improving in 5 days, please contact your primary care provider for an appointment or visit any of our convenient Walk-in or Urgent Care Centers to be seen, which can be found on our website here.    Thanks again for choosing us as your health care partner,    Radha Bell MD    Urinary Tract Infections in Women  Urinary tract infections (UTIs) are most often caused by bacteria. These bacteria enter the urinary tract. The bacteria may come from inside the body. Or they may travel from the skin outside the rectum or vagina into the urethra. Female anatomy makes it easy for bacteria from the bowel to enter a woman s urinary tract, which is the most common source of UTI. This means women develop UTIs more often than men. Pain in or around the urinary tract is a common UTI symptom. But the only way to know for sure if you have a UTI for the healthcare provider to test your urine. The two tests that may be done are the urinalysis and urine culture.     Types of UTIs    Cystitis. A bladder infection (cystitis) is the most common UTI in women. You may have urgent or frequent need to pee. You may also have pain, burning when you pee, and bloody  urine.    Urethritis. This is an inflamed urethra, which is the tube that carries urine from the bladder to outside the body. You may have lower stomach or back pain. You may also have urgent or frequent need to pee.    Pyelonephritis. This is a kidney infection. If not treated, it can be serious and damage your kidneys. In severe cases, you may need to stay in the hospital. You may have a fever and lower back pain.    Medicines to treat a UTI  Most UTIs are treated with antibiotics. These kill the bacteria. The length of time you need to take them depends on the type of infection. It may be as short as 3 days. If you have repeated UTIs, you may need a low-dose antibiotic for several months. Take antibiotics exactly as directed. Don t stop taking them until all of the medicine is gone. If you stop taking the antibiotic too soon, the infection may not go away. You may also develop a resistance to the antibiotic. This can make it much harder to treat.   Lifestyle changes to treat and prevent UTIs   The lifestyle changes below will help get rid of your UTI. They may also help prevent future UTIs.     Drink plenty of fluids. This includes water, juice, or other caffeine-free drinks. Fluids help flush bacteria out of your body.    Empty your bladder. Always empty your bladder when you feel the urge to pee. And always pee before going to sleep. Urine that stays in your bladder can lead to infection. Try to pee before and after sex as well.    Practice good personal hygiene. Wipe yourself from front to back after using the toilet. This helps keep bacteria from getting into the urethra.    Use condoms during sex. These help prevent UTIs caused by sexually transmitted bacteria. Also don't use spermicides during sex. These can increase the risk for UTIs. Choose other forms of birth control instead. For women who tend to get UTIs after sex, a low-dose of a preventive antibiotic may be used. Be sure to discuss this option with  your healthcare provider.    Follow up with your healthcare provider as directed. He or she may test to make sure the infection has cleared. If needed, more treatment may be started.  Miriam last reviewed this educational content on 7/1/2019 2000-2021 [employer]. All rights reserved. This information is not intended as a substitute for professional medical care. Always follow your healthcare professional's instructions.

## 2022-03-24 DIAGNOSIS — K21.00 GASTROESOPHAGEAL REFLUX DISEASE WITH ESOPHAGITIS WITHOUT HEMORRHAGE: ICD-10-CM

## 2022-03-25 NOTE — TELEPHONE ENCOUNTER
Pending Prescriptions:                       Disp   Refills    omeprazole (PRILOSEC) 20 MG DR capsule     90 cap*3        Sig: Take 1 capsule (20 mg) by mouth daily    Routing refill request to provider for review/approval because:  Patient fails protocol

## 2022-04-04 ENCOUNTER — MYC MEDICAL ADVICE (OUTPATIENT)
Dept: INTERNAL MEDICINE | Facility: CLINIC | Age: 77
End: 2022-04-04
Payer: COMMERCIAL

## 2022-04-04 DIAGNOSIS — N39.0 ACUTE UTI (URINARY TRACT INFECTION): ICD-10-CM

## 2022-04-05 ENCOUNTER — IMMUNIZATION (OUTPATIENT)
Dept: NURSING | Facility: CLINIC | Age: 77
End: 2022-04-05
Payer: COMMERCIAL

## 2022-04-05 PROCEDURE — 0054A COVID-19,PF,PFIZER (12+ YRS): CPT

## 2022-04-05 PROCEDURE — 91305 COVID-19,PF,PFIZER (12+ YRS): CPT

## 2022-04-05 NOTE — TELEPHONE ENCOUNTER
Please review MyChart message and advise. Last UA/UC 12/27/21, does patient need UA?    Bárbara Urias RN  [facility]

## 2022-04-06 RX ORDER — NITROFURANTOIN 25; 75 MG/1; MG/1
100 CAPSULE ORAL 2 TIMES DAILY
Qty: 10 CAPSULE | Refills: 0 | Status: SHIPPED | OUTPATIENT
Start: 2022-04-06 | End: 2024-03-25

## 2022-04-14 DIAGNOSIS — E78.00 HYPERCHOLESTEREMIA: ICD-10-CM

## 2022-04-14 RX ORDER — SIMVASTATIN 20 MG
TABLET ORAL
Qty: 90 TABLET | Refills: 2 | Status: SHIPPED | OUTPATIENT
Start: 2022-04-14 | End: 2023-07-07

## 2022-04-14 NOTE — TELEPHONE ENCOUNTER
Simvastatin (Zocor) 20 mg tablet  Prescription approved per [facility] Refill Protocol.

## 2022-05-11 ENCOUNTER — TRANSFERRED RECORDS (OUTPATIENT)
Dept: HEALTH INFORMATION MANAGEMENT | Facility: CLINIC | Age: 77
End: 2022-05-11

## 2022-05-11 ENCOUNTER — ANCILLARY PROCEDURE (OUTPATIENT)
Dept: BONE DENSITY | Facility: CLINIC | Age: 77
End: 2022-05-11
Attending: INTERNAL MEDICINE
Payer: COMMERCIAL

## 2022-05-11 DIAGNOSIS — Z78.0 ASYMPTOMATIC MENOPAUSAL STATE: ICD-10-CM

## 2022-05-11 DIAGNOSIS — Z00.00 PREVENTATIVE HEALTH CARE: ICD-10-CM

## 2022-05-11 PROCEDURE — 77085 DXA BONE DENSITY AXL VRT FX: CPT | Performed by: INTERNAL MEDICINE

## 2022-06-02 DIAGNOSIS — M81.0 OSTEOPOROSIS, UNSPECIFIED OSTEOPOROSIS TYPE, UNSPECIFIED PATHOLOGICAL FRACTURE PRESENCE: Primary | ICD-10-CM

## 2022-06-02 RX ORDER — ALENDRONATE SODIUM 70 MG/1
70 TABLET ORAL
Qty: 12 TABLET | Refills: 3 | Status: SHIPPED | OUTPATIENT
Start: 2022-06-02 | End: 2023-05-03

## 2022-06-23 DIAGNOSIS — I47.10 SVT (SUPRAVENTRICULAR TACHYCARDIA) (H): ICD-10-CM

## 2022-06-23 DIAGNOSIS — R00.2 PALPITATIONS: ICD-10-CM

## 2022-06-23 RX ORDER — CARVEDILOL 12.5 MG/1
TABLET ORAL
Qty: 90 TABLET | Refills: 1 | Status: SHIPPED | OUTPATIENT
Start: 2022-06-23 | End: 2022-12-27

## 2022-06-23 NOTE — TELEPHONE ENCOUNTER
Carvedilol (Coreg) 12.5 mg tab  Prescription approved per [facility] Refill Protocol.

## 2022-08-01 NOTE — PROGRESS NOTES
AUDIOLOGY REPORT    SUBJECTIVE:  Lisa Lopez is a 75 year old female who was seen in the Audiology Clinic at the [facility] for audiologic evaluation, referred by Soco Durham M.D. .The patient has been seen previously in this clinic on 10/29/2019 for assessment and results indicated bilateral sensorineural hearing loss, normal to severe with poorer at 2 kHz left The patient reports a possible decrease in hearing. The patient denies  bilateral tinnitus, bilateral drainage, bilateral aural fullness and dizziness.  She notes some discomfort when removing her sleeve eardomes. The patient notes difficulty with communication in a variety of listening situations, but is helped by her hearing aid. he patient has been seen previously in this clinic and was fit with binaural Signia Pure Charge and Go 3NX on 1/21/2019.     OBJECTIVE:  Otoscopic exam indicates ears are clear of cerumen bilaterally     Pure Tone Thresholds assessed using conventional audiometry with good  reliability from 250-8000 Hz bilaterally using insert earphones and circumaural headphones     RIGHT:  normal sloping to severe sensorineural hearing loss    LEFT:    normal sloping to severe sensorineural hearing loss    Tympanogram:    RIGHT: normal eardrum mobility    LEFT:   normal eardrum mobility    Reflexes (reported by stimulus ear):  RIGHT: Ipsilateral is present at normal levels  RIGHT: Contralateral is present at normal levels  LEFT:   Ipsilateral is present at normal levels  LEFT:   Contralateral is present at normal levels      Speech Reception Threshold:    RIGHT: 20 dB HL    LEFT:   20 dB HL  Word Recognition Score:     RIGHT: 88% at 65 dB HL using NU-6 recorded word list.    LEFT:   96% at 70 dB HL using NU-6 recorded word list.    Hearing aids were checked and adjusted using real ear measures.  Domes were changed from small vented sleeve to tulip.   The hearing aid conformity evaluation was completed.The  hearing aids were placed and they provided a good fit. Real-ear-probe-microphone measurements were completed on the Iotum system and were a good match to NAL-NL1 target with soft sounds audible, moderate sounds comfortable, and loud sounds below discomfort. UCLs are verified through maximum power output measures and demonstrate appropriate limiting of loud inputs.  She was given a outdoor program to replace the TV program.      ASSESSMENT:   Compared to patient's previous audiogram dated 10/29/2018, hearing has changed slightly in the high pitches}.  Today s results were discussed with the patient in detail.  Hearing aids were adjusted as a warranty no charge service    PLAN:  Patient was counseled regarding hearing loss and hearing aid adjustments.  Her hearing aids were paired to her new phone.  It is recommended that the patient return if change is noted in her hearing, and to have her hearing aid checked every 4-6 months.  Please call this clinic with questions regarding these results or recommendations.    Bill Concepcion, CCC-A  Licensed Audiologist  [place] #[id number]    CC:  Soco Durham MD         [phone]

## 2022-08-04 ENCOUNTER — MYC MEDICAL ADVICE (OUTPATIENT)
Dept: INTERNAL MEDICINE | Facility: CLINIC | Age: 77
End: 2022-08-04

## 2022-08-04 DIAGNOSIS — F34.1 DYSTHYMIA: ICD-10-CM

## 2022-08-04 RX ORDER — LORAZEPAM 0.5 MG/1
0.25 TABLET ORAL DAILY
Qty: 45 TABLET | Refills: 1 | Status: SHIPPED | OUTPATIENT
Start: 2022-08-04 | End: 2023-01-27

## 2022-08-04 NOTE — TELEPHONE ENCOUNTER
Routing refill request to provider for review/approval because:  Drug not on the [facility] refill protocol     Bárbara Urias RN  [facility]

## 2022-10-20 ENCOUNTER — E-VISIT (OUTPATIENT)
Dept: URGENT CARE | Facility: CLINIC | Age: 77
End: 2022-10-20
Payer: COMMERCIAL

## 2022-10-20 DIAGNOSIS — N39.0 ACUTE UTI (URINARY TRACT INFECTION): Primary | ICD-10-CM

## 2022-10-20 PROCEDURE — 99421 OL DIG E/M SVC 5-10 MIN: CPT | Performed by: EMERGENCY MEDICINE

## 2022-10-20 RX ORDER — NITROFURANTOIN 25; 75 MG/1; MG/1
100 CAPSULE ORAL 2 TIMES DAILY
Qty: 10 CAPSULE | Refills: 0 | Status: SHIPPED | OUTPATIENT
Start: 2022-10-20 | End: 2022-10-25

## 2022-10-20 NOTE — PATIENT INSTRUCTIONS
Dear Lisa Lopez    After reviewing your responses, I've been able to diagnose you with a urinary tract infection, which is a common infection of the bladder with bacteria.  This is not a sexually transmitted infection, though urinating immediately after intercourse can help prevent infections.  Drinking lots of fluids is also helpful to clear your current infection and prevent the next one.      I have sent a prescription for antibiotics to your pharmacy to treat this infection.    It is important that you take all of your prescribed medication even if your symptoms are improving after a few doses.  Taking all of your medicine helps prevent the symptoms from returning.     If your symptoms worsen, you develop pain in your back or stomach, develop fevers, or are not improving in 5 days, please contact your primary care provider for an appointment or visit any of our convenient Walk-in or Urgent Care Centers to be seen, which can be found on our website here.    Thanks again for choosing us as your health care partner,    Vicente Khoury MD    Urinary Tract Infections in Women  Urinary tract infections (UTIs) are most often caused by bacteria. These bacteria enter the urinary tract. The bacteria may come from inside the body. Or they may travel from the skin outside the rectum or vagina into the urethra. Female anatomy makes it easy for bacteria from the bowel to enter a woman s urinary tract, which is the most common source of UTI. This means women develop UTIs more often than men. Pain in or around the urinary tract is a common UTI symptom. But the only way to know for sure if you have a UTI for the healthcare provider to test your urine. The two tests that may be done are the urinalysis and urine culture.     Types of UTIs    Cystitis. A bladder infection (cystitis) is the most common UTI in women. You may have urgent or frequent need to pee. You may also have pain, burning when you pee, and bloody  urine.    Urethritis. This is an inflamed urethra, which is the tube that carries urine from the bladder to outside the body. You may have lower stomach or back pain. You may also have urgent or frequent need to pee.    Pyelonephritis. This is a kidney infection. If not treated, it can be serious and damage your kidneys. In severe cases, you may need to stay in the hospital. You may have a fever and lower back pain.    Medicines to treat a UTI  Most UTIs are treated with antibiotics. These kill the bacteria. The length of time you need to take them depends on the type of infection. It may be as short as 3 days. If you have repeated UTIs, you may need a low-dose antibiotic for several months. Take antibiotics exactly as directed. Don t stop taking them until all of the medicine is gone. If you stop taking the antibiotic too soon, the infection may not go away. You may also develop a resistance to the antibiotic. This can make it much harder to treat.   Lifestyle changes to treat and prevent UTIs   The lifestyle changes below will help get rid of your UTI. They may also help prevent future UTIs.     Drink plenty of fluids. This includes water, juice, or other caffeine-free drinks. Fluids help flush bacteria out of your body.    Empty your bladder. Always empty your bladder when you feel the urge to pee. And always pee before going to sleep. Urine that stays in your bladder can lead to infection. Try to pee before and after sex as well.    Practice good personal hygiene. Wipe yourself from front to back after using the toilet. This helps keep bacteria from getting into the urethra.    Use condoms during sex. These help prevent UTIs caused by sexually transmitted bacteria. Also don't use spermicides during sex. These can increase the risk for UTIs. Choose other forms of birth control instead. For women who tend to get UTIs after sex, a low-dose of a preventive antibiotic may be used. Be sure to discuss this option with  your healthcare provider.    Follow up with your healthcare provider as directed. He or she may test to make sure the infection has cleared. If needed, more treatment may be started.  Miriam last reviewed this educational content on 7/1/2019 2000-2021 [employer]. All rights reserved. This information is not intended as a substitute for professional medical care. Always follow your healthcare professional's instructions.

## 2022-12-23 DIAGNOSIS — I47.10 SVT (SUPRAVENTRICULAR TACHYCARDIA) (H): ICD-10-CM

## 2022-12-23 DIAGNOSIS — R00.2 PALPITATIONS: ICD-10-CM

## 2022-12-27 RX ORDER — CARVEDILOL 12.5 MG/1
TABLET ORAL
Qty: 90 TABLET | Refills: 1 | Status: SHIPPED | OUTPATIENT
Start: 2022-12-27 | End: 2023-06-23

## 2022-12-27 NOTE — TELEPHONE ENCOUNTER
Carvedilol (Coreg) 12.5 mg tab  Medication is being filled for 1 time refill only due to:  Patient needs to be seen because due for annual 02/2023.  LOV 02/2022

## 2023-01-03 ENCOUNTER — ANCILLARY ORDERS (OUTPATIENT)
Dept: INTERNAL MEDICINE | Facility: CLINIC | Age: 78
End: 2023-01-03

## 2023-01-03 DIAGNOSIS — Z12.31 VISIT FOR SCREENING MAMMOGRAM: ICD-10-CM

## 2023-01-11 ENCOUNTER — HOSPITAL ENCOUNTER (OUTPATIENT)
Dept: MAMMOGRAPHY | Facility: CLINIC | Age: 78
Discharge: HOME | End: 2023-01-11
Attending: INTERNAL MEDICINE | Admitting: INTERNAL MEDICINE
Payer: COMMERCIAL

## 2023-01-11 DIAGNOSIS — Z12.31 VISIT FOR SCREENING MAMMOGRAM: ICD-10-CM

## 2023-01-11 DIAGNOSIS — I10 ESSENTIAL HYPERTENSION, BENIGN: ICD-10-CM

## 2023-01-11 PROCEDURE — 77067 SCR MAMMO BI INCL CAD: CPT

## 2023-01-11 NOTE — TELEPHONE ENCOUNTER
Pending Prescriptions:                       Disp   Refills    amLODIPine (NORVASC) 5 MG tablet [Pharmacy*90 tab*0        Sig: TAKE 1 TABLET(5 MG) BY MOUTH DAILY    Routing refill request to provider for review/approval because:  Drug interaction warning

## 2023-01-12 RX ORDER — AMLODIPINE BESYLATE 5 MG/1
TABLET ORAL
Qty: 90 TABLET | Refills: 3 | Status: SHIPPED | OUTPATIENT
Start: 2023-01-12 | End: 2024-01-09

## 2023-01-26 DIAGNOSIS — F34.1 DYSTHYMIA: ICD-10-CM

## 2023-01-27 RX ORDER — LORAZEPAM 0.5 MG/1
TABLET ORAL
Qty: 45 TABLET | Refills: 1 | Status: SHIPPED | OUTPATIENT
Start: 2023-01-27 | End: 2023-08-03

## 2023-03-21 ENCOUNTER — TELEPHONE (OUTPATIENT)
Dept: INTERNAL MEDICINE | Facility: CLINIC | Age: 78
End: 2023-03-21
Payer: COMMERCIAL

## 2023-03-21 DIAGNOSIS — K21.00 GASTROESOPHAGEAL REFLUX DISEASE WITH ESOPHAGITIS WITHOUT HEMORRHAGE: ICD-10-CM

## 2023-03-21 NOTE — TELEPHONE ENCOUNTER
Patient Quality Outreach    Patient is due for the following:   Depression  -  PHQ-9 needed  Physical Annual Wellness Visit    Next Steps:   Schedule a Annual Wellness Visit    Type of outreach:    Phone, left message for patient/parent to call back.      Questions for provider review:  None       Yoselyn Sorenson LPN

## 2023-03-22 NOTE — TELEPHONE ENCOUNTER
Pending Prescriptions:                       Disp   Refills    omeprazole (PRILOSEC) 20 MG DR capsule     90 cap*3        Sig: Take 1 capsule (20 mg) by mouth daily    Routing refill request to provider for review/approval because:  Needs provider review

## 2023-04-03 ENCOUNTER — E-VISIT (OUTPATIENT)
Dept: URGENT CARE | Facility: CLINIC | Age: 78
End: 2023-04-03
Payer: COMMERCIAL

## 2023-04-03 DIAGNOSIS — N39.0 ACUTE UTI (URINARY TRACT INFECTION): Primary | ICD-10-CM

## 2023-04-03 PROCEDURE — 99421 OL DIG E/M SVC 5-10 MIN: CPT | Performed by: FAMILY MEDICINE

## 2023-04-03 RX ORDER — SULFAMETHOXAZOLE/TRIMETHOPRIM 800-160 MG
1 TABLET ORAL 2 TIMES DAILY
Qty: 6 TABLET | Refills: 0 | Status: SHIPPED | OUTPATIENT
Start: 2023-04-03 | End: 2023-04-06

## 2023-04-03 NOTE — PATIENT INSTRUCTIONS
Dear Lisa Lopez    After reviewing your responses, I've been able to diagnose you with a urinary tract infection, which is a common infection of the bladder with bacteria.  This is not a sexually transmitted infection, though urinating immediately after intercourse can help prevent infections.  Drinking lots of fluids is also helpful to clear your current infection and prevent the next one.      I have sent a prescription for antibiotics to your pharmacy to treat this infection.    It is important that you take all of your prescribed medication even if your symptoms are improving after a few doses.  Taking all of your medicine helps prevent the symptoms from returning.     If your symptoms worsen, you develop pain in your back or stomach, develop fevers, or are not improving in 5 days, please contact your primary care provider for an appointment or visit any of our convenient Walk-in or Urgent Care Centers to be seen, which can be found on our website here.    Thanks again for choosing us as your health care partner,    Paulette Veronica MD    Urinary Tract Infections in Women  Urinary tract infections (UTIs) are most often caused by bacteria. These bacteria enter the urinary tract. The bacteria may come from inside the body. Or they may travel from the skin outside the rectum or vagina into the urethra. Female anatomy makes it easy for bacteria from the bowel to enter a woman s urinary tract, which is the most common source of UTI. This means women develop UTIs more often than men. Pain in or around the urinary tract is a common UTI symptom. But the only way to know for sure if you have a UTI for the healthcare provider to test your urine. The two tests that may be done are the urinalysis and urine culture.    Types of UTIs    Cystitis. A bladder infection (cystitis) is the most common UTI in women. You may have urgent or frequent need to pee. You may also have pain, burning when you pee, and bloody  urine.    Urethritis. This is an inflamed urethra, which is the tube that carries urine from the bladder to outside the body. You may have lower stomach or back pain. You may also have urgent or frequent need to pee.    Pyelonephritis. This is a kidney infection. If not treated, it can be serious and damage your kidneys. In severe cases, you may need to stay in the hospital. You may have a fever and lower back pain.    Medicines to treat a UTI  Most UTIs are treated with antibiotics. These kill the bacteria. The length of time you need to take them depends on the type of infection. It may be as short as 3 days. If you have repeated UTIs, you may need a low-dose antibiotic for several months. Take antibiotics exactly as directed. Don t stop taking them until all of the medicine is gone, even if you feel better. If you stop taking the antibiotic too soon, the infection may not go away. You may also develop a resistance to the antibiotic. This can make it much harder to treat.  Lifestyle changes to treat and prevent UTIs  The lifestyle changes below will help get rid of your UTI. They may also help prevent future UTIs.    Drink plenty of fluids. This includes water, juice, or other caffeine-free drinks. Fluids help flush bacteria out of your body.    Empty your bladder. Always empty your bladder when you feel the urge to pee. And always pee before going to sleep. Urine that stays in your bladder can lead to infection. Try to pee before and after sex as well.    Practice good personal hygiene. Wipe yourself from front to back after using the toilet. This helps keep bacteria from getting into the urethra.    Wear cotton underwear. Don't wear synthetic or tight-fitting underwear that can trap moisture. Change out of wet bathing suits and workout clothing quickly.    Take showers. Showers are better than baths for preventing UTIs.    Use condoms during sex. These help prevent UTIs caused by sexually transmitted bacteria.  Also don't use spermicides during sex. These can increase the risk for UTIs. Choose other forms of birth control instead. For women who tend to get UTIs after sex, a low-dose of a preventive antibiotic may be used. Be sure to discuss this option with your healthcare provider.    Follow up with your healthcare provider as directed. They may test to make sure the infection has cleared. If needed, more treatment may be started.  [employer] last reviewed this educational content on 9/1/2021 2000-2022 [employer]. All rights reserved. This information is not intended as a substitute for professional medical care. Always follow your healthcare professional's instructions.

## 2023-04-22 ENCOUNTER — HEALTH MAINTENANCE LETTER (OUTPATIENT)
Age: 78
End: 2023-04-22

## 2023-05-01 DIAGNOSIS — M81.0 OSTEOPOROSIS, UNSPECIFIED OSTEOPOROSIS TYPE, UNSPECIFIED PATHOLOGICAL FRACTURE PRESENCE: ICD-10-CM

## 2023-05-03 RX ORDER — ALENDRONATE SODIUM 70 MG/1
TABLET ORAL
Qty: 12 TABLET | Refills: 3 | Status: SHIPPED | OUTPATIENT
Start: 2023-05-03 | End: 2024-08-02

## 2023-05-03 NOTE — TELEPHONE ENCOUNTER
Routing refill request to provider for review/approval because:  Patient needs to be seen because it has been more than 1 year since last office visit.    Creatinine   Date Value Ref Range Status   02/08/2022 0.69 0.52 - 1.04 mg/dL Final   01/18/2021 0.88 0.52 - 1.04 mg/dL Final

## 2023-06-09 ASSESSMENT — ENCOUNTER SYMPTOMS
MYALGIAS: 0
BREAST MASS: 0
ABDOMINAL PAIN: 0
WEAKNESS: 0
HEADACHES: 0
FEVER: 0
ARTHRALGIAS: 0
NERVOUS/ANXIOUS: 0
PARESTHESIAS: 0
SHORTNESS OF BREATH: 0
CONSTIPATION: 0
CHILLS: 0
SORE THROAT: 0
HEARTBURN: 0
DIZZINESS: 0
NAUSEA: 0
HEMATURIA: 0
DYSURIA: 0
EYE PAIN: 0
HEMATOCHEZIA: 0
FREQUENCY: 0
JOINT SWELLING: 0
PALPITATIONS: 0
COUGH: 0
DIARRHEA: 0

## 2023-06-09 ASSESSMENT — ACTIVITIES OF DAILY LIVING (ADL): CURRENT_FUNCTION: NO ASSISTANCE NEEDED

## 2023-06-13 ENCOUNTER — OFFICE VISIT (OUTPATIENT)
Dept: INTERNAL MEDICINE | Facility: CLINIC | Age: 78
End: 2023-06-13
Payer: COMMERCIAL

## 2023-06-13 VITALS
BODY MASS INDEX: 21.21 KG/M2 | HEART RATE: 62 BPM | OXYGEN SATURATION: 99 % | SYSTOLIC BLOOD PRESSURE: 127 MMHG | WEIGHT: 132 LBS | HEIGHT: 66 IN | DIASTOLIC BLOOD PRESSURE: 70 MMHG | RESPIRATION RATE: 16 BRPM | TEMPERATURE: 98 F

## 2023-06-13 DIAGNOSIS — I10 ESSENTIAL HYPERTENSION, BENIGN: ICD-10-CM

## 2023-06-13 DIAGNOSIS — Z00.00 PREVENTATIVE HEALTH CARE: Primary | ICD-10-CM

## 2023-06-13 DIAGNOSIS — E78.00 HYPERCHOLESTEREMIA: ICD-10-CM

## 2023-06-13 DIAGNOSIS — Z11.59 NEED FOR HEPATITIS C SCREENING TEST: ICD-10-CM

## 2023-06-13 DIAGNOSIS — I47.10 PAROXYSMAL SUPRAVENTRICULAR TACHYCARDIA (H): ICD-10-CM

## 2023-06-13 DIAGNOSIS — E83.52 HIGH CALCIUM LEVELS: ICD-10-CM

## 2023-06-13 LAB
ALBUMIN SERPL BCG-MCNC: 4.6 G/DL (ref 3.5–5.2)
ALP SERPL-CCNC: 43 U/L (ref 35–104)
ALT SERPL W P-5'-P-CCNC: 29 U/L (ref 0–50)
ANION GAP SERPL CALCULATED.3IONS-SCNC: 10 MMOL/L (ref 7–15)
AST SERPL W P-5'-P-CCNC: 33 U/L (ref 0–45)
BASOPHILS # BLD AUTO: 0 10E3/UL (ref 0–0.2)
BASOPHILS NFR BLD AUTO: 1 %
BILIRUB SERPL-MCNC: 0.5 MG/DL
BUN SERPL-MCNC: 14 MG/DL (ref 8–23)
CALCIUM SERPL-MCNC: 10.4 MG/DL (ref 8.8–10.2)
CHLORIDE SERPL-SCNC: 105 MMOL/L (ref 98–107)
CHOLEST SERPL-MCNC: 168 MG/DL
CREAT SERPL-MCNC: 0.88 MG/DL (ref 0.51–0.95)
DEPRECATED HCO3 PLAS-SCNC: 26 MMOL/L (ref 22–29)
EOSINOPHIL # BLD AUTO: 0.3 10E3/UL (ref 0–0.7)
EOSINOPHIL NFR BLD AUTO: 4 %
ERYTHROCYTE [DISTWIDTH] IN BLOOD BY AUTOMATED COUNT: 12.9 % (ref 10–15)
GFR SERPL CREATININE-BSD FRML MDRD: 66 ML/MIN/1.73M2
GLUCOSE SERPL-MCNC: 98 MG/DL (ref 70–99)
HCT VFR BLD AUTO: 41.9 % (ref 35–47)
HCV AB SERPL QL IA: NONREACTIVE
HDLC SERPL-MCNC: 61 MG/DL
HGB BLD-MCNC: 14.1 G/DL (ref 11.7–15.7)
IMM GRANULOCYTES # BLD: 0 10E3/UL
IMM GRANULOCYTES NFR BLD: 0 %
LDLC SERPL CALC-MCNC: 84 MG/DL
LYMPHOCYTES # BLD AUTO: 2.5 10E3/UL (ref 0.8–5.3)
LYMPHOCYTES NFR BLD AUTO: 39 %
MCH RBC QN AUTO: 30.6 PG (ref 26.5–33)
MCHC RBC AUTO-ENTMCNC: 33.7 G/DL (ref 31.5–36.5)
MCV RBC AUTO: 91 FL (ref 78–100)
MONOCYTES # BLD AUTO: 0.5 10E3/UL (ref 0–1.3)
MONOCYTES NFR BLD AUTO: 8 %
NEUTROPHILS # BLD AUTO: 3.1 10E3/UL (ref 1.6–8.3)
NEUTROPHILS NFR BLD AUTO: 49 %
NONHDLC SERPL-MCNC: 107 MG/DL
PLATELET # BLD AUTO: 351 10E3/UL (ref 150–450)
POTASSIUM SERPL-SCNC: 5.1 MMOL/L (ref 3.4–5.3)
PROT SERPL-MCNC: 7.7 G/DL (ref 6.4–8.3)
RBC # BLD AUTO: 4.61 10E6/UL (ref 3.8–5.2)
SODIUM SERPL-SCNC: 141 MMOL/L (ref 136–145)
T4 FREE SERPL-MCNC: 0.99 NG/DL (ref 0.9–1.7)
TRIGL SERPL-MCNC: 113 MG/DL
TSH SERPL DL<=0.005 MIU/L-ACNC: 7.05 UIU/ML (ref 0.3–4.2)
WBC # BLD AUTO: 6.4 10E3/UL (ref 4–11)

## 2023-06-13 PROCEDURE — 84443 ASSAY THYROID STIM HORMONE: CPT | Performed by: INTERNAL MEDICINE

## 2023-06-13 PROCEDURE — 80061 LIPID PANEL: CPT | Performed by: INTERNAL MEDICINE

## 2023-06-13 PROCEDURE — 80053 COMPREHEN METABOLIC PANEL: CPT | Performed by: INTERNAL MEDICINE

## 2023-06-13 PROCEDURE — G0439 PPPS, SUBSEQ VISIT: HCPCS | Performed by: INTERNAL MEDICINE

## 2023-06-13 PROCEDURE — 36415 COLL VENOUS BLD VENIPUNCTURE: CPT | Performed by: INTERNAL MEDICINE

## 2023-06-13 PROCEDURE — 84439 ASSAY OF FREE THYROXINE: CPT | Performed by: INTERNAL MEDICINE

## 2023-06-13 PROCEDURE — 99213 OFFICE O/P EST LOW 20 MIN: CPT | Mod: 25 | Performed by: INTERNAL MEDICINE

## 2023-06-13 PROCEDURE — 85025 COMPLETE CBC W/AUTO DIFF WBC: CPT | Performed by: INTERNAL MEDICINE

## 2023-06-13 PROCEDURE — 86803 HEPATITIS C AB TEST: CPT | Performed by: INTERNAL MEDICINE

## 2023-06-13 ASSESSMENT — ENCOUNTER SYMPTOMS
NERVOUS/ANXIOUS: 0
MYALGIAS: 0
ABDOMINAL PAIN: 0
ARTHRALGIAS: 0
SHORTNESS OF BREATH: 0
JOINT SWELLING: 0
WEAKNESS: 0
HEARTBURN: 0
HEMATURIA: 0
EYE PAIN: 0
SORE THROAT: 0
DIZZINESS: 0
BREAST MASS: 0
CHILLS: 0
HEADACHES: 0
FREQUENCY: 0
COUGH: 0
NAUSEA: 0
FEVER: 0
DIARRHEA: 0
CONSTIPATION: 0
DYSURIA: 0
HEMATOCHEZIA: 0
PARESTHESIAS: 0
PALPITATIONS: 0

## 2023-06-13 ASSESSMENT — PATIENT HEALTH QUESTIONNAIRE - PHQ9
10. IF YOU CHECKED OFF ANY PROBLEMS, HOW DIFFICULT HAVE THESE PROBLEMS MADE IT FOR YOU TO DO YOUR WORK, TAKE CARE OF THINGS AT HOME, OR GET ALONG WITH OTHER PEOPLE: NOT DIFFICULT AT ALL
SUM OF ALL RESPONSES TO PHQ QUESTIONS 1-9: 0
SUM OF ALL RESPONSES TO PHQ QUESTIONS 1-9: 0

## 2023-06-13 ASSESSMENT — ACTIVITIES OF DAILY LIVING (ADL): CURRENT_FUNCTION: NO ASSISTANCE NEEDED

## 2023-06-13 NOTE — PROGRESS NOTES
"SUBJECTIVE:   Lisa is a 77 year old who presents for Preventive Visit.    Fasting. Frequent urination at night. Rectal problem. Bowel changes since c-diff .        6/13/2023     6:48 AM   Additional Questions   Roomed by MITCHELL Sorenson LPN   Accompanied by none         6/13/2023     6:48 AM   Patient Reported Additional Medications   Patient reports taking the following new medications none     Are you in the first 12 months of your Medicare coverage?  No    Healthy Habits:     In general, how would you rate your overall health?  Good    Frequency of exercise:  2-3 days/week    Duration of exercise:  15-30 minutes    Do you usually eat at least 4 servings of fruit and vegetables a day, include whole grains    & fiber and avoid regularly eating high fat or \"junk\" foods?  Yes    Taking medications regularly:  Yes    Medication side effects:  None    Ability to successfully perform activities of daily living:  No assistance needed    Home Safety:  Lack of grab bars in the bathroom    Hearing Impairment:  No hearing concerns    In the past 6 months, have you been bothered by leaking of urine?  No    In general, how would you rate your overall mental or emotional health?  Good      PHQ-2 Total Score: 0    Additional concerns today:  Yes        Have you ever done Advance Care Planning? (For example, a Health Directive, POLST, or a discussion with a medical provider or your loved ones about your wishes): Yes, advance care planning is on file.    Has HAs.    Fall risk  Fallen 2 or more times in the past year?: No  Any fall with injury in the past year?: No    Cognitive Screening   1) Repeat 3 items (Leader, Season, Table)    2) Clock draw: NORMAL  3) 3 item recall: Recalls 3 objects  Results: 3 items recalled: COGNITIVE IMPAIRMENT LESS LIKELY    Mini-CogTM Copyright S Saurabh. Licensed by the author for use in [facility]; reprinted with permission (georgi@.[email or web address]). All rights reserved.      Do you have sleep " apnea, excessive snoring or daytime drowsiness?: no    Reviewed and updated as needed this visit by clinical staff                  Reviewed and updated as needed this visit by Provider                 Social History     Tobacco Use     Smoking status: Former     Years: 5.00     Types: Cigarettes     Start date: 1963     Quit date: 1968     Years since quittin.4     Smokeless tobacco: Never     Tobacco comments:     Smoked [age] yo.   Vaping Use     Vaping status: Never Used   Substance Use Topics     Alcohol use: Yes     Alcohol/week: 0.0 standard drinks of alcohol     Comment: 1-2 glasses of wine or beer a week.             2023     5:50 PM   Alcohol Use   Prescreen: >3 drinks/day or >7 drinks/week? No     Do you have a current opioid prescription? No  Do you use any other controlled substances or medications that are not prescribed by a provider? Alcohol        Current providers sharing in care for this patient include:   Patient Care Team:  Soco Durham MD as PCP - General (Internal Medicine)  Soco Durham MD as Assigned PCP    The following health maintenance items are reviewed in Epic and correct as of today:  Health Maintenance   Topic Date Due     ANNUAL REVIEW OF HM ORDERS  Never done     HEPATITIS C SCREENING  Never done     LUNG CANCER SCREENING  2019     COVID-19 Vaccine (5 - Pfizer series) 2022     MEDICARE ANNUAL WELLNESS VISIT  2023     Pneumococcal Vaccine: 65+ Years (3 - PPSV23 if available, else PCV20) 2026 (Originally 2016)     PHQ-9  2023     FALL RISK ASSESSMENT  2024     LIPID  [id number]     ADVANCE CARE PLANNING  2027     DTAP/TDAP/TD IMMUNIZATION (3 - Td or Tdap) 2032     DEXA  2037     DEPRESSION ACTION PLAN  Completed     INFLUENZA VACCINE  Completed     ZOSTER IMMUNIZATION  Completed     IPV IMMUNIZATION  Aged Out     MENINGITIS IMMUNIZATION  Aged Out     MAMMO SCREENING  Discontinued      "COLORECTAL CANCER SCREENING  Discontinued     BP Readings from Last 3 Encounters:   06/13/23 127/70   02/08/22 130/62   12/20/21 126/67    Wt Readings from Last 3 Encounters:   06/13/23 59.9 kg (132 lb)   02/08/22 58.4 kg (128 lb 12.8 oz)   12/20/21 58.5 kg (129 lb)                 Mammogram Screening - Patient over age 75, has elected to continue with screening.  Pertinent mammograms are reviewed under the imaging tab.    Review of Systems   Constitutional: Negative for chills and fever.   HENT: Negative for congestion, ear pain, hearing loss and sore throat.    Eyes: Negative for pain and visual disturbance.   Respiratory: Negative for cough and shortness of breath.    Cardiovascular: Negative for chest pain, palpitations and peripheral edema.   Gastrointestinal: Negative for abdominal pain, constipation, diarrhea, heartburn, hematochezia and nausea.   Breasts:  Negative for tenderness, breast mass and discharge.   Genitourinary: Negative for dysuria, frequency, genital sores, hematuria, pelvic pain, urgency, vaginal bleeding and vaginal discharge.   Musculoskeletal: Negative for arthralgias, joint swelling and myalgias.   Skin: Negative for rash.   Neurological: Negative for dizziness, weakness, headaches and paresthesias.   Psychiatric/Behavioral: Negative for mood changes. The patient is not nervous/anxious.      She has some mild rectal prolapse and nocturia 2-3 times a night. No lower extremity edema. 1 UTI in the last year.     OBJECTIVE:   BP (!) 141/69   Pulse 62   Temp 98  F (36.7  C) (Oral)   Resp 16   Ht 1.664 m (5' 5.5\")   Wt 59.9 kg (132 lb)   LMP  (LMP Unknown)   SpO2 99%   Breastfeeding No   BMI 21.63 kg/m   Estimated body mass index is 21.63 kg/m  as calculated from the following:    Height as of this encounter: 1.664 m (5' 5.5\").    Weight as of this encounter: 59.9 kg (132 lb).  Physical Exam  GENERAL: healthy, alert and no distress  EYES: Eyes grossly normal to inspection, PERRL and " conjunctivae and sclerae normal  HENT: ear canals and TM's normal, nose and mouth without ulcers or lesions  NECK: no adenopathy, no asymmetry, masses, or scars and thyroid normal to palpation  RESP: lungs clear to auscultation - no rales, rhonchi or wheezes  CV: regular rate and rhythm, normal S1 S2, no S3 or S4, no murmur, click or rub, no peripheral edema and peripheral pulses strong  ABDOMEN: soft, nontender, no hepatosplenomegaly, no masses and bowel sounds normal  MS: no gross musculoskeletal defects noted, no edema  SKIN: no suspicious lesions or rashes  NEURO: Normal strength and tone, mentation intact and speech normal  PSYCH: mentation appears normal, affect normal/bright      ASSESSMENT / PLAN:   (Z00.00) Preventative health care  (primary encounter diagnosis)  Comment:    Plan: Hepatitis C Screen Reflex to HCV RNA Quant and         Genotype             (I10) Essential hypertension, benign  Comment: under good control   Plan: CBC with platelets and differential,         Comprehensive metabolic panel (BMP + Alb, Alk         Phos, ALT, AST, Total. Bili, TP), TSH with free        T4 reflex, Lipid Profile (Chol, Trig, HDL, LDL         calc)             (I47.1) Paroxysmal supraventricular tachycardia (H)  Comment: under good control   Plan:      (E78.00) Hypercholesteremia  Comment: due for fasting lipid panel   Plan: Comprehensive metabolic panel (BMP + Alb, Alk         Phos, ALT, AST, Total. Bili, TP), Lipid Profile        (Chol, Trig, HDL, LDL calc)             (Z11.59) Need for hepatitis C screening test  Comment:    Plan: Hepatitis C Screen Reflex to HCV RNA Quant and         Genotype               Patient has been advised of split billing requirements and indicates understanding: Yes      COUNSELING:  Reviewed preventive health counseling, as reflected in patient instructions       Regular exercise       Healthy diet/nutrition        She reports that she quit smoking about 55 years ago. Her smoking use  included cigarettes. She started smoking about 60 years ago. She has never used smokeless tobacco.      Appropriate preventive services were discussed with this patient, including applicable screening as appropriate for cardiovascular disease, diabetes, osteopenia/osteoporosis, and glaucoma.  As appropriate for age/gender, discussed screening for colorectal cancer, prostate cancer, breast cancer, and cervical cancer. Checklist reviewing preventive services available has been given to the patient.    Reviewed patients plan of care and provided an AVS. The Basic Care Plan (routine screening as documented in Health Maintenance) for Lisa meets the Care Plan requirement. This Care Plan has been established and reviewed with the Patient.          Soco Durham MD  [facility]    Identified Health Risks:    Answers for HPI/ROS submitted by the patient on 6/13/2023  If you checked off any problems, how difficult have these problems made it for you to do your work, take care of things at home, or get along with other people?: Not difficult at all  PHQ9 TOTAL SCORE: 0

## 2023-06-18 DIAGNOSIS — K21.00 GASTROESOPHAGEAL REFLUX DISEASE WITH ESOPHAGITIS WITHOUT HEMORRHAGE: ICD-10-CM

## 2023-06-20 NOTE — TELEPHONE ENCOUNTER
Pending Prescriptions:                       Disp   Refills    omeprazole (PRILOSEC) 20 MG DR capsule [Ph*90 cap*0        Sig: TAKE 1 CAPSULE(20 MG) BY MOUTH DAILY    Routing refill request to provider for review/approval because:  Needs provider review

## 2023-06-21 ENCOUNTER — E-VISIT (OUTPATIENT)
Dept: INTERNAL MEDICINE | Facility: CLINIC | Age: 78
End: 2023-06-21
Payer: COMMERCIAL

## 2023-06-21 ENCOUNTER — TELEPHONE (OUTPATIENT)
Dept: INTERNAL MEDICINE | Facility: CLINIC | Age: 78
End: 2023-06-21

## 2023-06-21 DIAGNOSIS — B37.31 YEAST INFECTION OF THE VAGINA: ICD-10-CM

## 2023-06-21 PROCEDURE — 99421 OL DIG E/M SVC 5-10 MIN: CPT | Performed by: INTERNAL MEDICINE

## 2023-06-21 RX ORDER — FLUCONAZOLE 150 MG/1
TABLET ORAL
Qty: 5 TABLET | Refills: 0 | Status: SHIPPED | OUTPATIENT
Start: 2023-06-21 | End: 2024-03-25

## 2023-06-21 NOTE — TELEPHONE ENCOUNTER
Received phone call from patient who states that she was returning a phone call from Dr. Durham. Patient submitted e-visit today no notes in chart to patient regarding a return phone call. Clarified with JOSIAH Topete that patient didn't need anything further from Dr. Durham.     Patient aware of prescription  and verbalized understanding of scheduling with gynecology if symptoms persist after treatment. Provided phone number for gynecology for patient.    Omar Huertas, Triage RN [facility]  11:58 AM 6/21/2023

## 2023-06-23 DIAGNOSIS — I47.10 SVT (SUPRAVENTRICULAR TACHYCARDIA) (H): ICD-10-CM

## 2023-06-23 DIAGNOSIS — R00.2 PALPITATIONS: ICD-10-CM

## 2023-06-23 RX ORDER — CARVEDILOL 12.5 MG/1
TABLET ORAL
Qty: 90 TABLET | Refills: 1 | Status: SHIPPED | OUTPATIENT
Start: 2023-06-23 | End: 2023-12-18

## 2023-07-05 ENCOUNTER — LAB (OUTPATIENT)
Dept: LAB | Facility: CLINIC | Age: 78
End: 2023-07-05
Payer: COMMERCIAL

## 2023-07-05 DIAGNOSIS — E83.52 HIGH CALCIUM LEVELS: ICD-10-CM

## 2023-07-05 LAB
ALBUMIN SERPL BCG-MCNC: 4.3 G/DL (ref 3.5–5.2)
ALP SERPL-CCNC: 40 U/L (ref 35–104)
ALT SERPL W P-5'-P-CCNC: 32 U/L (ref 0–50)
ANION GAP SERPL CALCULATED.3IONS-SCNC: 9 MMOL/L (ref 7–15)
AST SERPL W P-5'-P-CCNC: 32 U/L (ref 0–45)
BILIRUB SERPL-MCNC: 0.4 MG/DL
BUN SERPL-MCNC: 16.8 MG/DL (ref 8–23)
CALCIUM SERPL-MCNC: 9.5 MG/DL (ref 8.8–10.2)
CHLORIDE SERPL-SCNC: 109 MMOL/L (ref 98–107)
CREAT SERPL-MCNC: 0.74 MG/DL (ref 0.51–0.95)
DEPRECATED HCO3 PLAS-SCNC: 25 MMOL/L (ref 22–29)
GFR SERPL CREATININE-BSD FRML MDRD: 82 ML/MIN/1.73M2
GLUCOSE SERPL-MCNC: 81 MG/DL (ref 70–99)
POTASSIUM SERPL-SCNC: 4.7 MMOL/L (ref 3.4–5.3)
PROT SERPL-MCNC: 7.2 G/DL (ref 6.4–8.3)
PTH-INTACT SERPL-MCNC: 67 PG/ML (ref 15–65)
SODIUM SERPL-SCNC: 143 MMOL/L (ref 136–145)

## 2023-07-05 PROCEDURE — 82306 VITAMIN D 25 HYDROXY: CPT

## 2023-07-05 PROCEDURE — 36415 COLL VENOUS BLD VENIPUNCTURE: CPT

## 2023-07-05 PROCEDURE — 80053 COMPREHEN METABOLIC PANEL: CPT

## 2023-07-05 PROCEDURE — 83970 ASSAY OF PARATHORMONE: CPT

## 2023-07-06 LAB — DEPRECATED CALCIDIOL+CALCIFEROL SERPL-MC: 76 UG/L (ref 20–75)

## 2023-07-07 DIAGNOSIS — E78.00 HYPERCHOLESTEREMIA: ICD-10-CM

## 2023-07-07 NOTE — TELEPHONE ENCOUNTER
Routing refill request to provider for review/approval because:  Drug interaction warning    Omar Huertas, Triage RN [place]  4:20 PM 7/7/2023

## 2023-07-13 RX ORDER — SIMVASTATIN 20 MG
TABLET ORAL
Qty: 90 TABLET | Refills: 2 | Status: SHIPPED | OUTPATIENT
Start: 2023-07-13 | End: 2024-08-03

## 2023-07-20 ENCOUNTER — E-VISIT (OUTPATIENT)
Dept: URGENT CARE | Facility: CLINIC | Age: 78
End: 2023-07-20
Payer: COMMERCIAL

## 2023-07-20 DIAGNOSIS — N39.0 ACUTE UTI (URINARY TRACT INFECTION): Primary | ICD-10-CM

## 2023-07-20 PROCEDURE — 99207 PR NON-BILLABLE SERV PER CHARTING: CPT | Performed by: PHYSICIAN ASSISTANT

## 2023-07-20 RX ORDER — CEFDINIR 300 MG/1
300 CAPSULE ORAL 2 TIMES DAILY
Qty: 14 CAPSULE | Refills: 0 | Status: SHIPPED | OUTPATIENT
Start: 2023-07-20 | End: 2023-07-27

## 2023-07-20 NOTE — PATIENT INSTRUCTIONS
Dear Lisa Lopez    After reviewing your responses, I've been able to diagnose you with a urinary tract infection, which is a common infection of the bladder with bacteria.  This is not a sexually transmitted infection, though urinating immediately after intercourse can help prevent infections.  Drinking lots of fluids is also helpful to clear your current infection and prevent the next one.      I have sent a prescription for antibiotics to your pharmacy to treat this infection.    It is important that you take all of your prescribed medication even if your symptoms are improving after a few doses.  Taking all of your medicine helps prevent the symptoms from returning.     If your symptoms worsen, you develop pain in your back or stomach, develop fevers, or are not improving in 5 days, please contact your primary care provider for an appointment or visit any of our convenient Walk-in or Urgent Care Centers to be seen, which can be found on our website here.    Thanks again for choosing us as your health care partner,    Wilfred Steele, [facility], PA-C    Urinary Tract Infections in Women  Urinary tract infections (UTIs) are most often caused by bacteria. These bacteria enter the urinary tract. The bacteria may come from inside the body. Or they may travel from the skin outside the rectum or vagina into the urethra. Female anatomy makes it easy for bacteria from the bowel to enter a woman s urinary tract, which is the most common source of UTI. This means women develop UTIs more often than men. Pain in or around the urinary tract is a common UTI symptom. But the only way to know for sure if you have a UTI for the healthcare provider to test your urine. The two tests that may be done are the urinalysis and urine culture.    Types of UTIs    Cystitis. A bladder infection (cystitis) is the most common UTI in women. You may have urgent or frequent need to pee. You may also have pain, burning when you pee, and bloody  urine.    Urethritis. This is an inflamed urethra, which is the tube that carries urine from the bladder to outside the body. You may have lower stomach or back pain. You may also have urgent or frequent need to pee.    Pyelonephritis. This is a kidney infection. If not treated, it can be serious and damage your kidneys. In severe cases, you may need to stay in the hospital. You may have a fever and lower back pain.    Medicines to treat a UTI  Most UTIs are treated with antibiotics. These kill the bacteria. The length of time you need to take them depends on the type of infection. It may be as short as 3 days. If you have repeated UTIs, you may need a low-dose antibiotic for several months. Take antibiotics exactly as directed. Don t stop taking them until all of the medicine is gone, even if you feel better. If you stop taking the antibiotic too soon, the infection may not go away. You may also develop a resistance to the antibiotic. This can make it much harder to treat.  Lifestyle changes to treat and prevent UTIs  The lifestyle changes below will help get rid of your UTI. They may also help prevent future UTIs.    Drink plenty of fluids. This includes water, juice, or other caffeine-free drinks. Fluids help flush bacteria out of your body.    Empty your bladder. Always empty your bladder when you feel the urge to pee. And always pee before going to sleep. Urine that stays in your bladder can lead to infection. Try to pee before and after sex as well.    Practice good personal hygiene. Wipe yourself from front to back after using the toilet. This helps keep bacteria from getting into the urethra.    Wear cotton underwear. Don't wear synthetic or tight-fitting underwear that can trap moisture. Change out of wet bathing suits and workout clothing quickly.    Take showers. Showers are better than baths for preventing UTIs.    Use condoms during sex. These help prevent UTIs caused by sexually transmitted bacteria.  Also don't use spermicides during sex. These can increase the risk for UTIs. Choose other forms of birth control instead. For women who tend to get UTIs after sex, a low-dose of a preventive antibiotic may be used. Be sure to discuss this option with your healthcare provider.    Follow up with your healthcare provider as directed. They may test to make sure the infection has cleared. If needed, more treatment may be started.  [employer] last reviewed this educational content on 9/1/2021 2000-2023 [employer]. All rights reserved. This information is not intended as a substitute for professional medical care. Always follow your healthcare professional's instructions.

## 2023-08-03 ENCOUNTER — MYC MEDICAL ADVICE (OUTPATIENT)
Dept: INTERNAL MEDICINE | Facility: CLINIC | Age: 78
End: 2023-08-03
Payer: COMMERCIAL

## 2023-08-03 DIAGNOSIS — F34.1 DYSTHYMIA: ICD-10-CM

## 2023-08-03 RX ORDER — LORAZEPAM 0.5 MG/1
TABLET ORAL
Qty: 45 TABLET | Refills: 0 | Status: SHIPPED | OUTPATIENT
Start: 2023-08-03 | End: 2023-10-28

## 2023-08-03 NOTE — TELEPHONE ENCOUNTER
Routing refill request to provider for review/approval because:  Drug not on the [facility] refill protocol   Ebony Hodges RN

## 2023-09-16 DIAGNOSIS — K21.00 GASTROESOPHAGEAL REFLUX DISEASE WITH ESOPHAGITIS WITHOUT HEMORRHAGE: ICD-10-CM

## 2023-09-29 ENCOUNTER — VIRTUAL VISIT (OUTPATIENT)
Dept: UROLOGY | Facility: CLINIC | Age: 78
End: 2023-09-29
Payer: COMMERCIAL

## 2023-09-29 DIAGNOSIS — R30.0 DYSURIA: Primary | ICD-10-CM

## 2023-09-29 DIAGNOSIS — Z87.440 HISTORY OF UTI: ICD-10-CM

## 2023-09-29 PROCEDURE — 99204 OFFICE O/P NEW MOD 45 MIN: CPT | Mod: VID | Performed by: PHYSICIAN ASSISTANT

## 2023-09-29 RX ORDER — ESTRADIOL 0.1 MG/G
CREAM VAGINAL
Qty: 42.5 G | Refills: 3 | Status: SHIPPED | OUTPATIENT
Start: 2023-09-29

## 2023-09-29 ASSESSMENT — PAIN SCALES - GENERAL: PAINLEVEL: NO PAIN (0)

## 2023-09-29 NOTE — LETTER
9/29/2023       RE: Lisa Lopez  [address] [facility] [address]     Dear Colleague,    Thank you for referring your patient, Lisa Lopez, to the [facility] at [facility]. Please see a copy of my visit note below.    How would you like to obtain your AVS? MyChart  If the video visit is dropped, the invitation should be resent by: Text to cell phone: [phone]  Will anyone else be joining your video visit? No          Video-Visit Details    Type of service:  Video Visit     Originating Location (pt. Location): Home    Distant Location (provider location):  On-site  Platform used for Video Visit: Wundrbar  Start time: 1030am  End time: 1043am    CC: UTI symptoms    HPI:  Lisa Lopez is a 78 year old female who presents in consultation from Wilfred Steele PA-C for evaluation of the above. Last +UC in 2021 (e coli). Has had multiple e-Visits recently and treated with abx without culture. Has had courses of Omnicef, Bactrim, Macrobid. Also treated for vaginal candida.     Notes burning, freq, small vol, sense of incomplete emptying intermittently. Nocturia x 1-3. Symptoms improve quickly with abx.     C diff in 2001.     Past Medical History:   Diagnosis Date    Cough 4/15/2014    Dysthymia 4/15/2014    Essential hypertension, benign 4/15/2014    Hypercholesteremia 4/15/2014    Paroxysmal supraventricular tachycardia (H) 4/15/2014       Past Surgical History:   Procedure Laterality Date    ABDOMEN SURGERY  1972    Tubal ligation    APPENDECTOMY  Unsure    Removed when had vaginal hysterectomy.    COLONOSCOPY N/A 3/23/2017    Procedure: COMBINED COLONOSCOPY, SINGLE OR MULTIPLE BIOPSY/POLYPECTOMY BY BIOPSY;  Surgeon: Devante Sanchez MD;  Location: [facility]    HYSTERECTOMY, PAP NO LONGER INDICATED         Social History     Socioeconomic History    Marital status:      Spouse name: Not on file    Number of  children: Not on file    Years of education: Not on file    Highest education level: Not on file   Occupational History     Employer: RETIRED   Tobacco Use    Smoking status: Former     Years: 5.00     Types: Cigarettes     Start date: 1963     Quit date: 1968     Years since quittin.7    Smokeless tobacco: Never    Tobacco comments:     Smoked [age] yo.   Vaping Use    Vaping Use: Never used   Substance and Sexual Activity    Alcohol use: Yes     Alcohol/week: 0.0 standard drinks of alcohol     Comment: 1-2 glasses of wine or beer a week.    Drug use: No    Sexual activity: Yes     Partners: Male   Other Topics Concern     Service Not Asked    Blood Transfusions Not Asked    Caffeine Concern No     Comment: not caffeine     Occupational Exposure Not Asked    Hobby Hazards Not Asked    Sleep Concern Not Asked    Stress Concern Not Asked    Weight Concern Not Asked    Special Diet No     Comment: regular diet     Back Care Not Asked    Exercise Yes     Comment: 3 times a week at gym     Bike Helmet Not Asked    Seat Belt Not Asked    Self-Exams Not Asked    Parent/sibling w/ CABG, MI or angioplasty before 65F 55M? Yes     Comment: Mother; heart attack age 53. brother Renato age 53 angioplast   Social History Narrative    Not on file     Social Determinants of Health     Financial Resource Strain: Not on file   Food Insecurity: Not on file   Transportation Needs: Not on file   Physical Activity: Not on file   Stress: Not on file   Social Connections: Not on file   Interpersonal Safety: Not on file   Housing Stability: Not on file       Family History   Problem Relation Age of Onset    Diabetes Father 60         68 yo    Asthma Father         Mild    Heart Disease Mother          68 yo     Hypertension Mother         , heart attack, age 67    Hyperlipidemia Mother     Heart Disease Brother          75 yo    Heart Disease Brother          76 yo    Heart  Disease Brother          76 yo     Heart Disease Brother 67        Triple bypass    Family History Negative Sister          60 yo MVA     Family History Negative Sister     Family History Negative Daughter     Family History Negative Daughter     Diabetes Sister     Hypertension Sister         , car accident, age 61    Hyperlipidemia Sister     Diabetes Brother     Hypertension Brother         , car accident, age 61    Hyperlipidemia Brother     Hypertension Sister     Hyperlipidemia Sister     Hypertension Brother          following heart surgery, age 77    Hyperlipidemia Brother     Hypertension Brother     Hyperlipidemia Brother     Hypertension Brother         ,stroke after heart surgery    Hyperlipidemia Brother     Cerebrovascular Disease Brother         After heart surgery, from stroke, age 77    Other Cancer Daughter         Brain tumor, ; surgery.    Colon Cancer No family hx of        Allergies   Allergen Reactions    Bupropion      Night sweats    Sertraline      Dizziness and hot flashes       Current Outpatient Medications   Medication    carvedilol (COREG) 12.5 MG tablet    alendronate (FOSAMAX) 70 MG tablet    amLODIPine (NORVASC) 5 MG tablet    aspirin 81 MG tablet    Cholecalciferol (VITAMIN D) 2000 UNITS tablet    fish oil-omega-3 fatty acids (OMEGA 3) 1000 MG capsule    fluconazole (DIFLUCAN) 150 MG tablet    Lactobacillus (PROBIOTIC ACIDOPHILUS) CAPS    LORazepam (ATIVAN) 0.5 MG tablet    Lutein 20 MG TABS    Multiple Vitamins-Minerals (EYE VITAMINS & MINERALS) TABS    nitroFURantoin macrocrystal-monohydrate (MACROBID) 100 MG capsule    omeprazole (PRILOSEC) 20 MG DR capsule    simvastatin (ZOCOR) 20 MG tablet    vitamin C w/RADHA HIPS 500 MG tablet    zinc gluconate 50 MG tablet     No current facility-administered medications for this visit.         PEx:   not currently breastfeeding.    PSYCH: NAD  EYES: EOMI  MOUTH: MMM  NEURO: AAO  x3    Urine:      A/P: Lisa Lopez is a 78 year old female with UTI symptoms (Bacterial vs irritative).  -UA/UC prior to an abx course given C diff hx  -Estrogen cream three times a week near urethra (pea-sized amount): If >$50, she will let me know and we can get via a compound pharmacy [email or web address].  -3 mo video to review progress.       Vee Zaragoza PA-C  [facility]        25 minutes spent on the date of the encounter doing chart review, review of outside records, review of test results, interpretation of tests, patient visit and documentation

## 2023-09-29 NOTE — PROGRESS NOTES
How would you like to obtain your AVS? MyChart  If the video visit is dropped, the invitation should be resent by: Text to cell phone: [phone]  Will anyone else be joining your video visit? No          Video-Visit Details    Type of service:  Video Visit     Originating Location (pt. Location): Home    Distant Location (provider location):  On-site  Platform used for Video Visit: NextDigest  Start time: 1030am  End time: 1043am    CC: UTI symptoms    HPI:  Lisa Lopez is a 78 year old female who presents in consultation from Wilfred Steele PA-C for evaluation of the above. Last +UC in  (e coli). Has had multiple e-Visits recently and treated with abx without culture. Has had courses of Omnicef, Bactrim, Macrobid. Also treated for vaginal candida.     Notes burning, freq, small vol, sense of incomplete emptying intermittently. Nocturia x 1-3. Symptoms improve quickly with abx.     C diff in .     Past Medical History:   Diagnosis Date    Cough 4/15/2014    Dysthymia 4/15/2014    Essential hypertension, benign 4/15/2014    Hypercholesteremia 4/15/2014    Paroxysmal supraventricular tachycardia (H) 4/15/2014       Past Surgical History:   Procedure Laterality Date    ABDOMEN SURGERY      Tubal ligation    APPENDECTOMY  Unsure    Removed when had vaginal hysterectomy.    COLONOSCOPY N/A 3/23/2017    Procedure: COMBINED COLONOSCOPY, SINGLE OR MULTIPLE BIOPSY/POLYPECTOMY BY BIOPSY;  Surgeon: Devante Sanchez MD;  Location:  GI    HYSTERECTOMY, PAP NO LONGER INDICATED         Social History     Socioeconomic History    Marital status:      Spouse name: Not on file    Number of children: Not on file    Years of education: Not on file    Highest education level: Not on file   Occupational History     Employer: RETIRED   Tobacco Use    Smoking status: Former     Years: 5.00     Types: Cigarettes     Start date: 1963     Quit date: 1968     Years since quittin.7    Smokeless  tobacco: Never    Tobacco comments:     Smoked [age] yo.   Vaping Use    Vaping Use: Never used   Substance and Sexual Activity    Alcohol use: Yes     Alcohol/week: 0.0 standard drinks of alcohol     Comment: 1-2 glasses of wine or beer a week.    Drug use: No    Sexual activity: Yes     Partners: Male   Other Topics Concern     Service Not Asked    Blood Transfusions Not Asked    Caffeine Concern No     Comment: not caffeine     Occupational Exposure Not Asked    Hobby Hazards Not Asked    Sleep Concern Not Asked    Stress Concern Not Asked    Weight Concern Not Asked    Special Diet No     Comment: regular diet     Back Care Not Asked    Exercise Yes     Comment: 3 times a week at gym     Bike Helmet Not Asked    Seat Belt Not Asked    Self-Exams Not Asked    Parent/sibling w/ CABG, MI or angioplasty before 65F 55M? Yes     Comment: Mother; heart attack age 53. brother Renato age 53 angioplast   Social History Narrative    Not on file     Social Determinants of Health     Financial Resource Strain: Not on file   Food Insecurity: Not on file   Transportation Needs: Not on file   Physical Activity: Not on file   Stress: Not on file   Social Connections: Not on file   Interpersonal Safety: Not on file   Housing Stability: Not on file       Family History   Problem Relation Age of Onset    Diabetes Father 60         66 yo    Asthma Father         Mild    Heart Disease Mother          66 yo     Hypertension Mother         , heart attack, age 67    Hyperlipidemia Mother     Heart Disease Brother          77 yo    Heart Disease Brother          76 yo    Heart Disease Brother          76 yo     Heart Disease Brother 67        Triple bypass    Family History Negative Sister          62 yo MVA     Family History Negative Sister     Family History Negative Daughter     Family History Negative Daughter     Diabetes Sister     Hypertension Sister         ,  car accident, age 61    Hyperlipidemia Sister     Diabetes Brother     Hypertension Brother         , car accident, age 61    Hyperlipidemia Brother     Hypertension Sister     Hyperlipidemia Sister     Hypertension Brother          following heart surgery, age 77    Hyperlipidemia Brother     Hypertension Brother     Hyperlipidemia Brother     Hypertension Brother         ,stroke after heart surgery    Hyperlipidemia Brother     Cerebrovascular Disease Brother         After heart surgery, from stroke, age 77    Other Cancer Daughter         Brain tumor, ; surgery.    Colon Cancer No family hx of        Allergies   Allergen Reactions    Bupropion      Night sweats    Sertraline      Dizziness and hot flashes       Current Outpatient Medications   Medication    carvedilol (COREG) 12.5 MG tablet    alendronate (FOSAMAX) 70 MG tablet    amLODIPine (NORVASC) 5 MG tablet    aspirin 81 MG tablet    Cholecalciferol (VITAMIN D) 2000 UNITS tablet    fish oil-omega-3 fatty acids (OMEGA 3) 1000 MG capsule    fluconazole (DIFLUCAN) 150 MG tablet    Lactobacillus (PROBIOTIC ACIDOPHILUS) CAPS    LORazepam (ATIVAN) 0.5 MG tablet    Lutein 20 MG TABS    Multiple Vitamins-Minerals (EYE VITAMINS & MINERALS) TABS    nitroFURantoin macrocrystal-monohydrate (MACROBID) 100 MG capsule    omeprazole (PRILOSEC) 20 MG DR capsule    simvastatin (ZOCOR) 20 MG tablet    vitamin C w/RADHA HIPS 500 MG tablet    zinc gluconate 50 MG tablet     No current facility-administered medications for this visit.         PEx:   not currently breastfeeding.    PSYCH: NAD  EYES: EOMI  MOUTH: MMM  NEURO: AAO x3    Urine:      A/P: Lisarosalio oLpez is a 78 year old female with UTI symptoms (Bacterial vs irritative).  -UA/UC prior to an abx course given C diff hx  -Estrogen cream three times a week near urethra (pea-sized amount): If >$50, she will let me know and we can get via a compound pharmacy or [facility].  -3 mo video to review progress.       Vee Zaragoza PA-C  [facility]        25 minutes spent on the date of the encounter doing chart review, review of outside records, review of test results, interpretation of tests, patient visit and documentation

## 2023-09-29 NOTE — PATIENT INSTRUCTIONS
Estrogen cream three times a week near urethra (pea-sized amount): If >$50, let me know and we can get via a compound pharmacy or [facility].     Please make an appointment at your local [place] lab ([phone]) to leave a urine sample if you develop symptoms.

## 2023-09-29 NOTE — NURSING NOTE
Is the patient currently in the state of [place]? YES    Visit mode:VIDEO    If the visit is dropped, the patient can be reconnected by: VIDEO VISIT: Text to cell phone:   Telephone Information:   Mobile [phone]       Will anyone else be joining the visit? NO  (If patient encounters technical issues they should call [phone])    How would you like to obtain your AVS? MyChart    Are changes needed to the allergy or medication list? Yes pt has medication requested for removal     Reason for visit: Consult (Acute UTI)    Hien SOLIS

## 2023-10-01 DIAGNOSIS — K21.00 GASTROESOPHAGEAL REFLUX DISEASE WITH ESOPHAGITIS WITHOUT HEMORRHAGE: ICD-10-CM

## 2023-10-02 ENCOUNTER — MYC REFILL (OUTPATIENT)
Dept: INTERNAL MEDICINE | Facility: CLINIC | Age: 78
End: 2023-10-02
Payer: COMMERCIAL

## 2023-10-02 DIAGNOSIS — K21.00 GASTROESOPHAGEAL REFLUX DISEASE WITH ESOPHAGITIS WITHOUT HEMORRHAGE: ICD-10-CM

## 2023-10-03 ENCOUNTER — TELEPHONE (OUTPATIENT)
Dept: UROLOGY | Facility: CLINIC | Age: 78
End: 2023-10-03
Payer: COMMERCIAL

## 2023-10-03 NOTE — TELEPHONE ENCOUNTER
----- Message from Lindy Ball sent at 10/3/2023  8:30 AM CDT -----  Regarding: 3 month follow up  3 mo, video, med check    [facility]  9/29/23

## 2023-10-10 ENCOUNTER — LAB (OUTPATIENT)
Dept: LAB | Facility: CLINIC | Age: 78
End: 2023-10-10
Payer: COMMERCIAL

## 2023-10-10 DIAGNOSIS — R30.0 DYSURIA: ICD-10-CM

## 2023-10-10 LAB
ALBUMIN UR-MCNC: 30 MG/DL
APPEARANCE UR: ABNORMAL
BACTERIA #/AREA URNS HPF: ABNORMAL /HPF
BILIRUB UR QL STRIP: NEGATIVE
COLOR UR AUTO: ABNORMAL
GLUCOSE UR STRIP-MCNC: NEGATIVE MG/DL
HGB UR QL STRIP: NEGATIVE
KETONES UR STRIP-MCNC: 15 MG/DL
LEUKOCYTE ESTERASE UR QL STRIP: ABNORMAL
MUCOUS THREADS #/AREA URNS LPF: PRESENT /LPF
NITRATE UR QL: NEGATIVE
PH UR STRIP: 5.5 [PH] (ref 5–7)
RBC #/AREA URNS AUTO: ABNORMAL /HPF
SP GR UR STRIP: 1.02 (ref 1–1.03)
SQUAMOUS #/AREA URNS AUTO: ABNORMAL /LPF
UROBILINOGEN UR STRIP-ACNC: 0.2 E.U./DL
WBC #/AREA URNS AUTO: ABNORMAL /HPF
WBC CLUMPS #/AREA URNS HPF: PRESENT /HPF

## 2023-10-10 PROCEDURE — 81001 URINALYSIS AUTO W/SCOPE: CPT

## 2023-10-10 PROCEDURE — 87086 URINE CULTURE/COLONY COUNT: CPT

## 2023-10-10 PROCEDURE — 87186 SC STD MICRODIL/AGAR DIL: CPT

## 2023-10-11 DIAGNOSIS — N39.0 UTI (URINARY TRACT INFECTION): Primary | ICD-10-CM

## 2023-10-11 LAB — BACTERIA UR CULT: ABNORMAL

## 2023-10-11 RX ORDER — SULFAMETHOXAZOLE/TRIMETHOPRIM 800-160 MG
1 TABLET ORAL 2 TIMES DAILY
Qty: 14 TABLET | Refills: 0 | Status: SHIPPED | OUTPATIENT
Start: 2023-10-11 | End: 2024-03-25

## 2023-10-28 ENCOUNTER — MYC REFILL (OUTPATIENT)
Dept: INTERNAL MEDICINE | Facility: CLINIC | Age: 78
End: 2023-10-28
Payer: COMMERCIAL

## 2023-10-28 DIAGNOSIS — F34.1 DYSTHYMIA: ICD-10-CM

## 2023-10-30 RX ORDER — LORAZEPAM 0.5 MG/1
TABLET ORAL
Qty: 45 TABLET | Refills: 0 | Status: SHIPPED | OUTPATIENT
Start: 2023-10-30 | End: 2024-01-25

## 2023-11-06 ENCOUNTER — LAB (OUTPATIENT)
Dept: LAB | Facility: CLINIC | Age: 78
End: 2023-11-06
Payer: COMMERCIAL

## 2023-11-06 DIAGNOSIS — R30.0 DYSURIA: ICD-10-CM

## 2023-11-06 LAB
ALBUMIN UR-MCNC: NEGATIVE MG/DL
AMORPH CRY #/AREA URNS HPF: ABNORMAL /HPF
APPEARANCE UR: CLEAR
BACTERIA #/AREA URNS HPF: ABNORMAL /HPF
BILIRUB UR QL STRIP: NEGATIVE
COLOR UR AUTO: YELLOW
GLUCOSE UR STRIP-MCNC: NEGATIVE MG/DL
HGB UR QL STRIP: NEGATIVE
KETONES UR STRIP-MCNC: NEGATIVE MG/DL
LEUKOCYTE ESTERASE UR QL STRIP: ABNORMAL
MUCOUS THREADS #/AREA URNS LPF: PRESENT /LPF
NITRATE UR QL: NEGATIVE
PH UR STRIP: 7 [PH] (ref 5–7)
RBC #/AREA URNS AUTO: ABNORMAL /HPF
SP GR UR STRIP: 1.02 (ref 1–1.03)
SQUAMOUS #/AREA URNS AUTO: ABNORMAL /LPF
UROBILINOGEN UR STRIP-ACNC: 0.2 E.U./DL
WBC #/AREA URNS AUTO: ABNORMAL /HPF

## 2023-11-06 PROCEDURE — 81001 URINALYSIS AUTO W/SCOPE: CPT

## 2023-11-06 PROCEDURE — 87086 URINE CULTURE/COLONY COUNT: CPT

## 2023-11-07 DIAGNOSIS — N30.00 ACUTE CYSTITIS WITHOUT HEMATURIA: Primary | ICD-10-CM

## 2023-11-07 LAB — BACTERIA UR CULT: NO GROWTH

## 2023-11-07 RX ORDER — NITROFURANTOIN 25; 75 MG/1; MG/1
100 CAPSULE ORAL 2 TIMES DAILY
Qty: 14 CAPSULE | Refills: 0 | Status: SHIPPED | OUTPATIENT
Start: 2023-11-07 | End: 2023-11-14

## 2023-12-18 ENCOUNTER — DOCUMENTATION ONLY (OUTPATIENT)
Dept: LAB | Facility: CLINIC | Age: 78
End: 2023-12-18
Payer: COMMERCIAL

## 2023-12-18 DIAGNOSIS — I47.10 SVT (SUPRAVENTRICULAR TACHYCARDIA) (H): ICD-10-CM

## 2023-12-18 DIAGNOSIS — E83.52 SERUM CALCIUM ELEVATED: Primary | ICD-10-CM

## 2023-12-18 DIAGNOSIS — R00.2 PALPITATIONS: ICD-10-CM

## 2023-12-18 RX ORDER — CARVEDILOL 12.5 MG/1
TABLET ORAL
Qty: 90 TABLET | Refills: 1 | Status: SHIPPED | OUTPATIENT
Start: 2023-12-18 | End: 2024-06-18

## 2023-12-18 NOTE — PROGRESS NOTES
Lisa Lopez has an upcoming lab appointment:    Future Appointments   Date Time Provider Department Center   12/22/2023  8:00 AM [facility]   1/3/2024 10:00 AM Vee Zaragoza PA-C [facility]       There is no order available. Please review and place either future orders or HMPO (Review of Health Maintenance Protocol Orders), as appropriate.    There are no preventive care reminders to display for this patient.  Sadie Toth

## 2023-12-22 ENCOUNTER — LAB (OUTPATIENT)
Dept: LAB | Facility: CLINIC | Age: 78
End: 2023-12-22
Payer: COMMERCIAL

## 2023-12-22 DIAGNOSIS — R30.0 DYSURIA: ICD-10-CM

## 2023-12-22 DIAGNOSIS — E83.52 SERUM CALCIUM ELEVATED: ICD-10-CM

## 2023-12-22 LAB
ALBUMIN SERPL BCG-MCNC: 4.4 G/DL (ref 3.5–5.2)
ALP SERPL-CCNC: 47 U/L (ref 40–150)
ALT SERPL W P-5'-P-CCNC: 24 U/L (ref 0–50)
ANION GAP SERPL CALCULATED.3IONS-SCNC: 10 MMOL/L (ref 7–15)
AST SERPL W P-5'-P-CCNC: 32 U/L (ref 0–45)
BILIRUB SERPL-MCNC: 0.6 MG/DL
BUN SERPL-MCNC: 18 MG/DL (ref 8–23)
CALCIUM SERPL-MCNC: 9.7 MG/DL (ref 8.8–10.2)
CHLORIDE SERPL-SCNC: 105 MMOL/L (ref 98–107)
CREAT SERPL-MCNC: 0.81 MG/DL (ref 0.51–0.95)
DEPRECATED HCO3 PLAS-SCNC: 26 MMOL/L (ref 22–29)
EGFRCR SERPLBLD CKD-EPI 2021: 73 ML/MIN/1.73M2
GLUCOSE SERPL-MCNC: 90 MG/DL (ref 70–99)
POTASSIUM SERPL-SCNC: 4.1 MMOL/L (ref 3.4–5.3)
PROT SERPL-MCNC: 7.4 G/DL (ref 6.4–8.3)
PTH-INTACT SERPL-MCNC: 43 PG/ML (ref 15–65)
SODIUM SERPL-SCNC: 141 MMOL/L (ref 135–145)
VIT D+METAB SERPL-MCNC: 87 NG/ML (ref 20–50)

## 2023-12-22 PROCEDURE — 80053 COMPREHEN METABOLIC PANEL: CPT

## 2023-12-22 PROCEDURE — 36415 COLL VENOUS BLD VENIPUNCTURE: CPT

## 2023-12-22 PROCEDURE — 87086 URINE CULTURE/COLONY COUNT: CPT

## 2023-12-22 PROCEDURE — 82306 VITAMIN D 25 HYDROXY: CPT

## 2023-12-22 PROCEDURE — 83970 ASSAY OF PARATHORMONE: CPT

## 2023-12-23 LAB — BACTERIA UR CULT: NORMAL

## 2023-12-30 DIAGNOSIS — K21.00 GASTROESOPHAGEAL REFLUX DISEASE WITH ESOPHAGITIS WITHOUT HEMORRHAGE: ICD-10-CM

## 2024-01-03 ENCOUNTER — VIRTUAL VISIT (OUTPATIENT)
Dept: UROLOGY | Facility: CLINIC | Age: 79
End: 2024-01-03
Payer: COMMERCIAL

## 2024-01-03 ENCOUNTER — MYC MEDICAL ADVICE (OUTPATIENT)
Dept: INTERNAL MEDICINE | Facility: CLINIC | Age: 79
End: 2024-01-03

## 2024-01-03 DIAGNOSIS — R30.0 DYSURIA: ICD-10-CM

## 2024-01-03 DIAGNOSIS — Z87.440 HISTORY OF UTI: ICD-10-CM

## 2024-01-03 DIAGNOSIS — R35.0 URINARY FREQUENCY: Primary | ICD-10-CM

## 2024-01-03 PROCEDURE — 99214 OFFICE O/P EST MOD 30 MIN: CPT | Mod: 95 | Performed by: PHYSICIAN ASSISTANT

## 2024-01-03 RX ORDER — MIRABEGRON 25 MG/1
25 TABLET, FILM COATED, EXTENDED RELEASE ORAL DAILY
Qty: 90 TABLET | Refills: 4 | Status: SHIPPED | OUTPATIENT
Start: 2024-01-03 | End: 2024-04-26

## 2024-01-03 ASSESSMENT — PAIN SCALES - GENERAL: PAINLEVEL: NO PAIN (0)

## 2024-01-03 NOTE — PATIENT INSTRUCTIONS
Please see one of the dedicated pelvic floor physical therapists ([facility] [phone]).    Please be aware that coverage of these services is subject to the terms and limitations of your health insurance plan.  Call member services at your health plan with any benefit or coverage questions.      Please bring the following to your appointment:    *Your personal calendar for scheduling future appointments  *Comfortable clothing  ____________________________________________________________    You have been prescribed medication to help relax your bladder (Myrbetriq 25mg daily)    This medication may help control (or improve) urinary frequency, urgency and urge incontinence.    Common side effects include:  Dry mouth (Biotene mouthwash can help with this.)  Constipation  Drowsiness  Blurred vision    Rare side effect:  Urinary retention  ____________________________________________________________    Below is a list of things that can irritate the bladder and should be eliminated:    Caffeinated soft drinks.  Coffee.  Tea.  Chocolate.  Tomato-based foods.  Acidic juices and fruits. (includes cranberry juice)  Alcohol.  Nicotine  Carbonated drinks.  Aspartame/Nutrasweet.

## 2024-01-03 NOTE — LETTER
1/3/2024       RE: Lisa Lopez  [address]  [facility] [address]     Dear Colleague,    Thank you for referring your patient, Lisa Lopez, to the [facility] at [facility]. Please see a copy of my visit note below.    How would you like to obtain your AVS? MyChart  If the video visit is dropped, the invitation should be resent by: Text to cell phone: [phone]  Will anyone else be joining your video visit? No          Video-Visit Details    Type of service:  Video Visit     Originating Location (pt. Location): Home    Distant Location (provider location):  On-site  Platform used for Video Visit: SoundCure  Start time: 1009am  End time: 1043am    CC: UTI symptoms    HPI:  Lisa Lopez is a very pleasant 78 year old female who presents in follow-up of the above.   Initially seen 9/29/23: Last +UC in 2021 (e coli). Has had multiple e-Visits recently and treated with abx without culture. Has had courses of Omnicef, Bactrim, Macrobid. Also treated for vaginal candida.     Notes burning, freq, small vol, sense of incomplete emptying intermittently. Nocturia x 1-3. Symptoms improve quickly with abx.     C diff in 2001.     TODAY 1/3/23  Nocturia x 3-4  First void more difficult in AM--needs to void frequently to feel empty.   UC x 3 in interim:  10/10/23: >100k e coli, 11/6/23: no growth, 12/22/23: low colony mixed emma  Continues with Vit C once daily and estrogen cream (has helped with some of the vaginal and urethral discomfort).     Past Medical History:   Diagnosis Date    Cough 4/15/2014    Dysthymia 4/15/2014    Essential hypertension, benign 4/15/2014    Hypercholesteremia 4/15/2014    Paroxysmal supraventricular tachycardia 4/15/2014       Past Surgical History:   Procedure Laterality Date    ABDOMEN SURGERY  1972    Tubal ligation    APPENDECTOMY  Unsure    Removed when had vaginal hysterectomy.    COLONOSCOPY  N/A 3/23/2017    Procedure: COMBINED COLONOSCOPY, SINGLE OR MULTIPLE BIOPSY/POLYPECTOMY BY BIOPSY;  Surgeon: Devante Sanchez MD;  Location: [facility]    HYSTERECTOMY, PAP NO LONGER INDICATED         Social History     Socioeconomic History    Marital status:      Spouse name: Not on file    Number of children: Not on file    Years of education: Not on file    Highest education level: Not on file   Occupational History     Employer: RETIRED   Tobacco Use    Smoking status: Former     Years: 5     Types: Cigarettes     Start date: 1963     Quit date: 1968     Years since quittin.9    Smokeless tobacco: Never    Tobacco comments:     Smoked [age] yo.   Vaping Use    Vaping Use: Never used   Substance and Sexual Activity    Alcohol use: Yes     Alcohol/week: 0.0 standard drinks of alcohol     Comment: 1-2 glasses of wine or beer a week.    Drug use: No    Sexual activity: Yes     Partners: Male   Other Topics Concern     Service Not Asked    Blood Transfusions Not Asked    Caffeine Concern No     Comment: not caffeine     Occupational Exposure Not Asked    Hobby Hazards Not Asked    Sleep Concern Not Asked    Stress Concern Not Asked    Weight Concern Not Asked    Special Diet No     Comment: regular diet     Back Care Not Asked    Exercise Yes     Comment: 3 times a week at gym     Bike Helmet Not Asked    Seat Belt Not Asked    Self-Exams Not Asked    Parent/sibling w/ CABG, MI or angioplasty before 65F 55M? Yes     Comment: Mother; heart attack age 53. brother Renato age 53 angioplast   Social History Narrative    Not on file     Social Determinants of Health     Financial Resource Strain: Not on file   Food Insecurity: Not on file   Transportation Needs: Not on file   Physical Activity: Not on file   Stress: Not on file   Social Connections: Not on file   Interpersonal Safety: Not on file   Housing Stability: Not on file       Family History   Problem Relation Age of Onset     Diabetes Father 60         68 yo    Asthma Father         Mild    Heart Disease Mother          68 yo     Hypertension Mother         , heart attack, age 67    Hyperlipidemia Mother     Heart Disease Brother          75 yo    Heart Disease Brother          78 yo    Heart Disease Brother          78 yo     Heart Disease Brother 67        Triple bypass    Family History Negative Sister          62 yo MVA     Family History Negative Sister     Family History Negative Daughter     Family History Negative Daughter     Diabetes Sister     Hypertension Sister         , car accident, age 61    Hyperlipidemia Sister     Diabetes Brother     Hypertension Brother         , car accident, age 61    Hyperlipidemia Brother     Hypertension Sister     Hyperlipidemia Sister     Hypertension Brother          following heart surgery, age 77    Hyperlipidemia Brother     Hypertension Brother     Hyperlipidemia Brother     Hypertension Brother         ,stroke after heart surgery    Hyperlipidemia Brother     Cerebrovascular Disease Brother         After heart surgery, from stroke, age 77    Other Cancer Daughter         Brain tumor, ; surgery.    Colon Cancer No family hx of        Allergies   Allergen Reactions    Bupropion      Night sweats    Sertraline      Dizziness and hot flashes       Current Outpatient Medications   Medication    alendronate (FOSAMAX) 70 MG tablet    amLODIPine (NORVASC) 5 MG tablet    aspirin 81 MG tablet    carvedilol (COREG) 12.5 MG tablet    Cholecalciferol (VITAMIN D) 2000 UNITS tablet    estradiol (ESTRACE VAGINAL) 0.1 MG/GM vaginal cream    fish oil-omega-3 fatty acids (OMEGA 3) 1000 MG capsule    Lactobacillus (PROBIOTIC ACIDOPHILUS) CAPS    LORazepam (ATIVAN) 0.5 MG tablet    Lutein 20 MG TABS    Multiple Vitamins-Minerals (EYE VITAMINS & MINERALS) TABS    omeprazole (PRILOSEC) 20 MG DR capsule     simvastatin (ZOCOR) 20 MG tablet    vitamin C w/RADHA HIPS 500 MG tablet    zinc gluconate 50 MG tablet    fluconazole (DIFLUCAN) 150 MG tablet    nitroFURantoin macrocrystal-monohydrate (MACROBID) 100 MG capsule    sulfamethoxazole-trimethoprim (BACTRIM DS) 800-160 MG tablet     No current facility-administered medications for this visit.         PEx:   not currently breastfeeding.    PSYCH: NAD  EYES: EOMI  MOUTH: MMM  NEURO: AAO x3    Urine:      A/P: Lisa Lopez is a 78 year old female with UTI symptoms (Bacterial vs irritative).  -UA/UC prior to an abx course given C diff hx  -Estrogen cream three times a week near urethra (pea-sized amount): If >$50, she will let me know and we can get via a compound pharmacy or [facility].  -PFPT eval  -Trial of mirabegron 25mg daily, SE reviewed.   -3 mo video to review progress.       Vee Zaragoza PA-C  [facility]        25 minutes spent on the date of the encounter doing chart review, review of outside records, review of test results, interpretation of tests, patient visit and documentation

## 2024-01-03 NOTE — NURSING NOTE
Pt states this morning, pt has had upset stomach. Pts states happens every month. Pt not sure if diet related.     Is the patient currently in the state of [place]? YES    Visit mode:VIDEO    If the visit is dropped, the patient can be reconnected by: VIDEO VISIT: Send to e-mail at: [email or web address]    Will anyone else be joining the visit? NO  (If patient encounters technical issues they should call [phone]    How would you like to obtain your AVS? MyChart    Are changes needed to the allergy or medication list? No    Reason for visit: RECHECK (3 month med check )    Landy SOLIS

## 2024-01-03 NOTE — PROGRESS NOTES
How would you like to obtain your AVS? VoiceBunny  If the video visit is dropped, the invitation should be resent by: Text to cell phone: [phone]  Will anyone else be joining your video visit? No          Video-Visit Details    Type of service:  Video Visit     Originating Location (pt. Location): Home    Distant Location (provider location):  On-site  Platform used for Video Visit: [facility]  Start time: 1009am  End time: 1043am    CC: UTI symptoms    HPI:  Lisa Lopez is a very pleasant 78 year old female who presents in follow-up of the above.   Initially seen 9/29/23: Last +UC in 2021 (e coli). Has had multiple e-Visits recently and treated with abx without culture. Has had courses of Omnicef, Bactrim, Macrobid. Also treated for vaginal candida.     Notes burning, freq, small vol, sense of incomplete emptying intermittently. Nocturia x 1-3. Symptoms improve quickly with abx.     C diff in 2001.     TODAY 1/3/23  Nocturia x 3-4  First void more difficult in AM--needs to void frequently to feel empty.   UC x 3 in interim:  10/10/23: >100k e coli, 11/6/23: no growth, 12/22/23: low colony mixed emma  Continues with Vit C once daily and estrogen cream (has helped with some of the vaginal and urethral discomfort).     Past Medical History:   Diagnosis Date    Cough 4/15/2014    Dysthymia 4/15/2014    Essential hypertension, benign 4/15/2014    Hypercholesteremia 4/15/2014    Paroxysmal supraventricular tachycardia 4/15/2014       Past Surgical History:   Procedure Laterality Date    ABDOMEN SURGERY  1972    Tubal ligation    APPENDECTOMY  Unsure    Removed when had vaginal hysterectomy.    COLONOSCOPY N/A 3/23/2017    Procedure: COMBINED COLONOSCOPY, SINGLE OR MULTIPLE BIOPSY/POLYPECTOMY BY BIOPSY;  Surgeon: Devante Sanchez MD;  Location: [facility]    HYSTERECTOMY, PAP NO LONGER INDICATED         Social History     Socioeconomic History    Marital status:      Spouse name: Not on file    Number of  children: Not on file    Years of education: Not on file    Highest education level: Not on file   Occupational History     Employer: RETIRED   Tobacco Use    Smoking status: Former     Years: 5     Types: Cigarettes     Start date: 1963     Quit date: 1968     Years since quittin.9    Smokeless tobacco: Never    Tobacco comments:     Smoked [age] yo.   Vaping Use    Vaping Use: Never used   Substance and Sexual Activity    Alcohol use: Yes     Alcohol/week: 0.0 standard drinks of alcohol     Comment: 1-2 glasses of wine or beer a week.    Drug use: No    Sexual activity: Yes     Partners: Male   Other Topics Concern     Service Not Asked    Blood Transfusions Not Asked    Caffeine Concern No     Comment: not caffeine     Occupational Exposure Not Asked    Hobby Hazards Not Asked    Sleep Concern Not Asked    Stress Concern Not Asked    Weight Concern Not Asked    Special Diet No     Comment: regular diet     Back Care Not Asked    Exercise Yes     Comment: 3 times a week at gym     Bike Helmet Not Asked    Seat Belt Not Asked    Self-Exams Not Asked    Parent/sibling w/ CABG, MI or angioplasty before 65F 55M? Yes     Comment: Mother; heart attack age 53. brother Renato age 53 angioplast   Social History Narrative    Not on file     Social Determinants of Health     Financial Resource Strain: Not on file   Food Insecurity: Not on file   Transportation Needs: Not on file   Physical Activity: Not on file   Stress: Not on file   Social Connections: Not on file   Interpersonal Safety: Not on file   Housing Stability: Not on file       Family History   Problem Relation Age of Onset    Diabetes Father 60         66 yo    Asthma Father         Mild    Heart Disease Mother          66 yo     Hypertension Mother         , heart attack, age 67    Hyperlipidemia Mother     Heart Disease Brother          75 yo    Heart Disease Brother          78 yo    Heart Disease  Brother          78 yo     Heart Disease Brother 67        Triple bypass    Family History Negative Sister          62 yo MVA     Family History Negative Sister     Family History Negative Daughter     Family History Negative Daughter     Diabetes Sister     Hypertension Sister         , car accident, age 61    Hyperlipidemia Sister     Diabetes Brother     Hypertension Brother         , car accident, age 61    Hyperlipidemia Brother     Hypertension Sister     Hyperlipidemia Sister     Hypertension Brother          following heart surgery, age 77    Hyperlipidemia Brother     Hypertension Brother     Hyperlipidemia Brother     Hypertension Brother         ,stroke after heart surgery    Hyperlipidemia Brother     Cerebrovascular Disease Brother         After heart surgery, from stroke, age 77    Other Cancer Daughter         Brain tumor, ; surgery.    Colon Cancer No family hx of        Allergies   Allergen Reactions    Bupropion      Night sweats    Sertraline      Dizziness and hot flashes       Current Outpatient Medications   Medication    alendronate (FOSAMAX) 70 MG tablet    amLODIPine (NORVASC) 5 MG tablet    aspirin 81 MG tablet    carvedilol (COREG) 12.5 MG tablet    Cholecalciferol (VITAMIN D) 2000 UNITS tablet    estradiol (ESTRACE VAGINAL) 0.1 MG/GM vaginal cream    fish oil-omega-3 fatty acids (OMEGA 3) 1000 MG capsule    Lactobacillus (PROBIOTIC ACIDOPHILUS) CAPS    LORazepam (ATIVAN) 0.5 MG tablet    Lutein 20 MG TABS    Multiple Vitamins-Minerals (EYE VITAMINS & MINERALS) TABS    omeprazole (PRILOSEC) 20 MG DR capsule    simvastatin (ZOCOR) 20 MG tablet    vitamin C w/RADHA HIPS 500 MG tablet    zinc gluconate 50 MG tablet    fluconazole (DIFLUCAN) 150 MG tablet    nitroFURantoin macrocrystal-monohydrate (MACROBID) 100 MG capsule    sulfamethoxazole-trimethoprim (BACTRIM DS) 800-160 MG tablet     No current facility-administered medications for  this visit.         PEx:   not currently breastfeeding.    PSYCH: NAD  EYES: EOMI  MOUTH: MMM  NEURO: AAO x3    Urine:      A/P: Lisarosalio Lopez is a 78 year old female with UTI symptoms (Bacterial vs irritative).  -UA/UC prior to an abx course given C diff hx  -Estrogen cream three times a week near urethra (pea-sized amount): If >$50, she will let me know and we can get via a compound pharmacy or [facility].  -PFPT eval  -Trial of mirabegron 25mg daily, SE reviewed.   -3 mo video to review progress.       Vee Zaragoza PA-C  [facility]        25 minutes spent on the date of the encounter doing chart review, review of outside records, review of test results, interpretation of tests, patient visit and documentation

## 2024-01-05 ENCOUNTER — TELEPHONE (OUTPATIENT)
Dept: UROLOGY | Facility: CLINIC | Age: 79
End: 2024-01-05
Payer: COMMERCIAL

## 2024-01-05 NOTE — TELEPHONE ENCOUNTER
----- Message from Lindy Ball sent at 1/4/2024  8:41 AM CST -----  Regarding: 3 month follow up  Return in about 3 months (around 4/3/2024) video med check    [facility]  1/3/24

## 2024-01-07 DIAGNOSIS — I10 ESSENTIAL HYPERTENSION, BENIGN: ICD-10-CM

## 2024-01-09 RX ORDER — AMLODIPINE BESYLATE 5 MG/1
TABLET ORAL
Qty: 90 TABLET | Refills: 3 | Status: SHIPPED | OUTPATIENT
Start: 2024-01-09

## 2024-01-15 ENCOUNTER — HOSPITAL ENCOUNTER (OUTPATIENT)
Dept: MAMMOGRAPHY | Facility: CLINIC | Age: 79
Discharge: HOME | End: 2024-01-15
Attending: INTERNAL MEDICINE | Admitting: INTERNAL MEDICINE
Payer: COMMERCIAL

## 2024-01-15 DIAGNOSIS — Z12.31 VISIT FOR SCREENING MAMMOGRAM: ICD-10-CM

## 2024-01-15 PROCEDURE — 77063 BREAST TOMOSYNTHESIS BI: CPT

## 2024-01-25 ENCOUNTER — MYC REFILL (OUTPATIENT)
Dept: INTERNAL MEDICINE | Facility: CLINIC | Age: 79
End: 2024-01-25
Payer: COMMERCIAL

## 2024-01-25 DIAGNOSIS — F34.1 DYSTHYMIA: ICD-10-CM

## 2024-01-25 RX ORDER — LORAZEPAM 0.5 MG/1
TABLET ORAL
Qty: 45 TABLET | Refills: 0 | Status: SHIPPED | OUTPATIENT
Start: 2024-01-25 | End: 2024-05-20

## 2024-02-17 ENCOUNTER — HOSPITAL ENCOUNTER (EMERGENCY)
Facility: CLINIC | Age: 79
Discharge: HOME | End: 2024-02-17
Attending: EMERGENCY MEDICINE | Admitting: EMERGENCY MEDICINE
Payer: COMMERCIAL

## 2024-02-17 VITALS
BODY MASS INDEX: 21.69 KG/M2 | RESPIRATION RATE: 18 BRPM | DIASTOLIC BLOOD PRESSURE: 64 MMHG | OXYGEN SATURATION: 97 % | HEART RATE: 61 BPM | HEIGHT: 66 IN | WEIGHT: 135 LBS | TEMPERATURE: 97.7 F | SYSTOLIC BLOOD PRESSURE: 139 MMHG

## 2024-02-17 DIAGNOSIS — R04.0 EPISTAXIS: ICD-10-CM

## 2024-02-17 PROCEDURE — 99282 EMERGENCY DEPT VISIT SF MDM: CPT

## 2024-02-17 RX ORDER — OXYMETAZOLINE HYDROCHLORIDE 0.05 G/100ML
SPRAY NASAL
Status: DISCONTINUED
Start: 2024-02-17 | End: 2024-02-18 | Stop reason: HOSPADM

## 2024-02-17 ASSESSMENT — ACTIVITIES OF DAILY LIVING (ADL): ADLS_ACUITY_SCORE: 33

## 2024-02-18 NOTE — ED TRIAGE NOTES
Controlled on the lower dose of metoprolol continue at this current dose  Pt arrives from home due to nose bleed that started about an hour ago. Pt applies hand with tissue to newman and denies putting head back or pinching nose, but states that blood would clot and stop at times, but then restart. Pt denies blood thinners.

## 2024-02-18 NOTE — ED PROVIDER NOTES
"  History     Chief Complaint:  Epistaxis       The history is provided by the patient.      Lisa Lopez is a 78 year old female who presents with epistaxis since 2 hours ago. She notes that the bleeding started after she blew her nose. She has been dealing with a little bit of a cough and congestion lately. She is not on blood thinners, but rather takes a low dose Aspirin on a nightly basis.  She denies any other abnormal bleeding or bruising.  She notes she has not had any nosebleeds since she was a child.  She notes she tried to stop the bleeding and it did stop at home but recurred so she presented to the emergency department.  At this time she feels the bleeding may have stopped.    Independent Historian:   None - Patient Only    Review of External Notes:   None clinically relevant to today's evaluation    Medications:    Alendronate   Amlodipine    Aspirin   Carvedilol   Estradiol   Fluconazole   Lorazepam     Lutein   Mirabegron   Macrobid   Omeprazole   Simvastatin   Bactrim     Past Medical History:    HTN   Hypercholesteremia   Paroxsymal SVT  Osteoporosis     Past Surgical History:    Tubal ligation   Appendectomy   Colonoscopy w/ polypectomy & bx   Hysterectomy     Physical Exam   Patient Vitals for the past 24 hrs:   BP Temp Temp src Pulse Resp SpO2 Height Weight   02/17/24 2300 139/64 -- -- 61 18 97 % -- --   02/17/24 2029 (!) 192/82 97.7  F (36.5  C) Oral 78 20 97 % 1.676 m (5' 6\") 61.2 kg (135 lb)        Physical Exam  General: Elderly female sitting upright  Eyes: PERRL, Conjunctive within normal limits  ENT: Ongoing epistaxis from the right nares when clamp is removed.  Unable to visualize responsible lesion/vessel.  No blood in the posterior oropharynx.  Moist mucous membranes, oropharynx clear.   CV: Regular rate and rhythm  Resp: Normal respiratory effort.  MSK: Ambulatory  Skin: Warm and dry. No rashes or lesions or ecchymoses on visible skin.  Neuro: Alert and oriented. Responds " appropriately to all questions and commands. No focal findings appreciated. Normal muscle tone.  Psych: Normal mood and affect. Pleasant.     Emergency Department Course   Emergency Department Course & Assessments:    Interventions:  None    Assessments:  2126 I obtained history and examined the patient as noted above.  I placed Afrin-soaked gauze in the patient's right nares.   I rechecked the patient.  I remove the gauze.  There is no active bleeding.  I reassessed the right nares.  Did not see a vessel amenable to cautery.  The patient ambulated and went to the bathroom and did not have any recurrence of bleeding and feels comfortable to plan for discharge.    Independent Interpretation (X-rays, CTs, rhythm strip):  None     Consultations/Discussion of Management or Tests:  None        Social Determinants of Health affecting care:   None    Disposition:  The patient was discharged.     Impression & Plan    Medical Decision Making:  Lisa Lopez is a 78 year old female who presents for evaluation of bleeding from the right nares.  The bleeding was controlled with interventions in the ED and therefore supportive outpatient management is indicated.  Close follow-up with ENT and primary care; see discharge instructions. There are no signs of coagulopathy causing the bleeding or a general medical condition (thrombocytopenia, DIC, leukemia, etc) causing the bleeding today.  Sounds like it was provoked by recent mild URI symptoms and use of decongestants which dried out her nose.  The bleeding stopped and did not restart here in ED, therefore no nasal packing is indicated.  Discussed with patient to use humidity and vaseline or bacitracin in nares bid for the next week.  Return precautions discussed.  All questions were answered prior to discharge.      Diagnosis:    ICD-10-CM    1. Epistaxis  R04.0            Scribe Disclosure:  I, Romario Joseph, am serving as a scribe at 9:30 PM on 2/17/2024 to document services  personally performed by Ruma Rand MD based on my observations and the provider's statements to me.   2/17/2024   Ruma Rand MD Jonkman, Tracy Dianne, MD  02/18/24 2942

## 2024-03-06 NOTE — LETTER
1/21/2019      Soco Durham MD  [address]  [facility] [address]      RE: Lisa Lopez       Dear Colleague,    I had the pleasure of seeing Lisa Lopez in the [facility].    Service Date: 01/21/2019      HISTORY OF PRESENT ILLNESS:  Ms. Lopez is a very pleasant 73-year-old female with a history of SVT and a strong family history of coronary artery disease.  She is here for a followup visit.  We have her on low-dose metoprolol for SVT symptoms.  She has had very infrequent symptoms over the past year.  She was concerned about her risks for heart disease.  Last year we did perform a calcium scoring to better risk stratify her.  Her total calcium scoring was 68 with a predominance in the left main, still putting her at low risk given her age for cardiovascular events.        Her risk factors have been under optimal control.  She is on simvastatin for hyperlipidemia.  Her last cholesterol numbers from 12/13 showed total cholesterol 172, HDL 52, LDL 94 and triglycerides 131.      PHYSICAL EXAMINATION:   VITAL SIGNS:  Blood pressure is under good control at 128/68, pulse is 64, weight is 145 with a body mass index of 23.   NECK:  Carotid upstrokes seem brisk without bruit.   CARDIOVASCULAR:  Tones are regular without murmur, gallop, rub, or ectopy.   LUNGS:  Clear posteriorly.   EXTREMITIES:  She has no peripheral edema.      SUMMARY:  Ms. Lopez is a very pleasant 73-year-old female with SVT and a family history of coronary artery disease.  She is doing well from a cardiac perspective, asymptomatic and with infrequent episodes of SVT.  Her risk factors are under optimal control.  I did refill her simvastatin for her as it was running low.  I am happy to see her back annually or refer her back to her primary care provider for continued primary prevention.  She prefers to follow up with me on an annual basis.  Please feel free to contact me with any  questions you have in regards to her care.      cc:      Soco Durham MD    [facility]   [address] [facility]   [place] [address]         LAURENCE YOON DO             D: 2019   T: 2019   MT: SHAVON      Name:     AYALA DILLARD   MRN:      [id number]        Account:      [id number]   :      1945           Service Date: 2019      Document: [id number]         Outpatient Encounter Medications as of 2019   Medication Sig Dispense Refill     amLODIPine (NORVASC) 5 MG tablet Take 1 tablet (5 mg) by mouth daily 90 tablet 0     aspirin 81 MG tablet Take 81 mg by mouth daily  30 tablet      Cholecalciferol (VITAMIN D) 2000 UNITS tablet Take 2,000 Units by mouth daily 100 tablet 3     Coenzyme Q10 (CO Q 10) 100 MG CAPS Take 1 capsule by mouth daily        fish oil-omega-3 fatty acids (OMEGA 3) 1000 MG capsule Take 1 g by mouth 2 times daily  90 capsule      LORazepam (ATIVAN) 0.5 MG tablet Take 0.5 tablets (0.25 mg) by mouth daily 45 tablet 1     metoprolol tartrate (LOPRESSOR) 25 MG tablet Take 0.5 tablets (12.5 mg) by mouth 2 times daily 90 tablet 3     multivitamin, therapeutic with minerals (MULTI-VITAMIN) TABS Take 1 tablet by mouth daily 100 tablet 3     Probiotic Product (PROBIOTIC PO) Take by mouth daily       Resveratrol 250 MG CAPS Take 500 mg by mouth daily       simvastatin (ZOCOR) 20 MG tablet Take 0.5 tablets (10 mg) by mouth At Bedtime 90 tablet 3     [DISCONTINUED] simvastatin (ZOCOR) 20 MG tablet Take 0.5 tablets (10 mg) by mouth At Bedtime 45 tablet 3     No facility-administered encounter medications on file as of 2019.        Again, thank you for allowing me to participate in the care of your patient.      Sincerely,    Laurence Yoon DO     [facility]     Detail Level: Detailed Add 08417 Cpt? (Important Note: In 2017 The Use Of 74362 Is Being Tracked By Cms To Determine Future Global Period Reimbursement For Global Periods): yes

## 2024-03-07 ENCOUNTER — THERAPY VISIT (OUTPATIENT)
Dept: PHYSICAL THERAPY | Facility: CLINIC | Age: 79
End: 2024-03-07
Payer: COMMERCIAL

## 2024-03-07 DIAGNOSIS — R35.0 URINARY FREQUENCY: ICD-10-CM

## 2024-03-07 PROCEDURE — 97110 THERAPEUTIC EXERCISES: CPT | Mod: GP

## 2024-03-07 PROCEDURE — 97161 PT EVAL LOW COMPLEX 20 MIN: CPT | Mod: GP

## 2024-03-07 NOTE — PROGRESS NOTES
PHYSICAL THERAPY EVALUATION  Type of Visit: Evaluation    See electronic medical record for Abuse and Falls Screening details.    Subjective       Presenting condition or subjective complaint: Lisa seen today for PT eval & treat d/t urinary urgency and concern about possible rectal prolapse. She noted that she frequently has to urinate during the day & night (~2-3 x per night; she is unsure of exact frequency during the day). She elucidated that she has noticed a mild reduction in urgency since taking Myrbetriq and denies urinary leakage. She noted limiting fluid intake when traveling & in community as well as pre-emptive bladder emptying prior to leaving home d/t concerns over urgency when in public. She is also concerned over possible rectal prolapse & notes the need to splint w/ her hand during BMs.    Date of onset: 01/03/24 (date of referral)    Relevant medical history: Bladder or bowel problems; Osteoporosis   Dates & types of surgery: Tubal 1971, vaginal hysterectomy 1990    Prior diagnostic imaging/testing results: Other Have had no tests or scans.   Prior therapy history for the same diagnosis, illness or injury: No      Prior Level of Function  Transfers: Independent  Ambulation: Independent  ADL: Independent    Living Environment  Social support: Alone   Type of home: [employer]   Stairs to enter the home: No       Ramp: No   Stairs inside the home: Yes 15 Is there a railing: Yes   Help at home: None  Equipment owned:       Employment: Yes [employer] &  Hobbies/Interests: Quilting & other sewing, reading, walking, sports    Patient goals for therapy: Better bladder control    Pain assessment: Pain denied     Objective      PELVIC EVALUATION  ADDITIONAL HISTORY:  Sex assigned at birth: Female  Gender identity: Female    Pronouns: She/Her Hers      Bladder History:  Feels bladder filling: No  Triggers for feeling of inability to wait to go to the bathroom: No    How long can you wait to urinate:  Not very long!  Gets up at night to urinate: Yes 1 to 3  Can stop the flow of urine when urinating: No  Volume of urine usually released: Medium   Other issues: Bladder infections  Number of bladder infections in last 12 months: 3  Fluid intake per day: 24 ounces      Medications taken for bladder: Yes Myrbetriq, AZO cranberry   Activities causing urine leak: Hurrying to the bathroom due to a strong urge to urinate (pee)    Amount of urine typically leaked:    Pads used to help with leaking: Yes I use this many pads per day: 1or2 I use this type/brand: Lyon mini    Bowel History:  Frequency of bowel movement: Approximately once every other day. I had Cdif Dec 2021 so have a touchy bowels.  Consistency of stool: Soft-formed    Ignores the urge to defecate: No  Other bowel issues:    Length of time spent trying to have a bowel movement:      Sexual Function History:  Sexual orientation: Straight    Sexually active: Yes  Lubrication used: No No  Pelvic pain:      Pain or difficulty with orgasms/erection/ejaculation: No    State of menopause: Post-menopause (I am done with menopause)  Hormone medications: No      Are you currently pregnant: No, Number of previous pregnancies: 2, Number of deliveries: 2, If you have delivered before, did you have any of these issues during delivery: Vaginal delivery, Have you been diagnosed with pelvic prolapse or abdominal separation: No, Do you get regular exercise: Yes, I do this type of exercise: Walking, Have you tried pelvic floor strengthening exercises for 4 weeks: No, Do you have any history of trauma that is relevant to your care that you d like to share: No    Discussed reason for referral regarding pelvic health needs and external/internal pelvic floor muscle examination with patient/guardian.  Opportunity provided to ask questions and verbal consent for assessment and intervention was given.    POSTURE: WFL  BREATHING SYMMETRY:  decreased belly expansion on inhale,  slight improvement w/ vc's & hand placement for tactile cue    PELVIC EXAM  External Visual Inspection:  At rest: Normal    Integumentary:   Introitus: Unremarkable    Internal Digital Palpation:  Per Vagina:  Myofascial Resistance to Palpation: Soft, reduce tone noted through LA bilaterally  Digital Muscle Performance: P (Power): 2/5 when breathing not cued, 2+/5 when breathing cued  E (Endurance): 3 sec  Compensations: Gluts  Relaxation Post-Contraction: Normal      Pelvic Organ Prolapse:   Posterior: Stage 2 observe in supine w/ bearing down    ABDOMINAL ASSESSMENT  Abdominal Activation/Strength:  assess further at next visit    Assessment & Plan   CLINICAL IMPRESSIONS  Medical Diagnosis: Urinary frequency (R35.0)  - Primary    Treatment Diagnosis: Pelvic Floor Muscle Dysfunction   Impression/Assessment: Patient is a 78 year old female with urinary urgency & possible rectal prolapse complaints.  The following significant findings have been identified: Decreased strength, Impaired muscle performance, and Decreased activity tolerance. These impairments interfere with their ability to perform self care tasks, work tasks, recreational activities, driving , household mobility, and community mobility as compared to previous level of function.     Clinical Decision Making (Complexity):  Clinical Presentation: Stable/Uncomplicated  Clinical Presentation Rationale: based on medical and personal factors listed in PT evaluation  Clinical Decision Making (Complexity): Low complexity    PLAN OF CARE  Treatment Interventions:  Modalities: Biofeedback  Interventions: Manual Therapy, Neuromuscular Re-education, Therapeutic Activity, Therapeutic Exercise, Self-Care/Home Management    Long Term Goals     PT Goal 1  Goal Identifier: Urinary Urgency Reduction  Goal Description: Pt will self-report consistently utilizing the restroom for urination no more frequently than every 2-4hrs daytime & 1x per night in order to participate  fully in work & household tasks.  Rationale: to maximize safety and independence with performance of ADLs and functional tasks;to maximize safety and independence within the home;to maximize safety and independence within the community;to maximize safety and independence with transportation;to maximize safety and independence with self cares  Target Date: 05/30/24  PT Goal 2  Goal Identifier: Prolapse Management  Goal Description: Pt will demonstrate prolapse of grade 1 or less in order to promote pelvic health& reduce splinting during bowel movements.  Rationale: to maximize safety and independence with performance of ADLs and functional tasks;to maximize safety and independence within the home;to maximize safety and independence within the community;to maximize safety and independence with self cares  Target Date: 05/30/24      Frequency of Treatment: 1x per week, taper as clinically appropriate  Duration of Treatment: 12 weeks    Recommended Referrals to Other Professionals:  No referrals currently indicated.  Education Assessment:   Learner/Method: Patient;Demonstration;Listening;Pictures/Video;No Barriers to Learning  Education Comments: Pt educated on role of PFPT for urgency UI, PT POC, & agreed upon goals.    Risks and benefits of evaluation/treatment have been explained.   Patient/Family/caregiver agrees with Plan of Care.     Evaluation Time:     PT Eval, Low Complexity Minutes (18757): 45     Signing Clinician: Blue Lisa, PT      [facility]                                                                                   OUTPATIENT PHYSICAL THERAPY      PLAN OF TREATMENT FOR OUTPATIENT REHABILITATION   Patient's Last Name, First Name, Lisa Muniz YOB: 1945   Provider's Name   [facility]   Medical Record No.  [id number]     Onset Date: 01/03/24 (date of referral)  Start of Care Date: 03/07/24     Medical  Diagnosis:  Urinary frequency (R35.0)  - Primary      PT Treatment Diagnosis:  Pelvic Floor Muscle Dysfunction Plan of Treatment  Frequency/Duration: 1x per week, taper as clinically appropriate/ 12 weeks    Certification date from 03/07/24 to 06/04/24         See note for plan of treatment details and functional goals     Blue Lisa, PT                         I CERTIFY THE NEED FOR THESE SERVICES FURNISHED UNDER        THIS PLAN OF TREATMENT AND WHILE UNDER MY CARE     (Physician attestation of this document indicates review and certification of the therapy plan).              Referring Provider:  Vee Zaragoza    Initial Assessment  See Epic Evaluation- Start of Care Date: 03/07/24

## 2024-03-07 NOTE — PROGRESS NOTES
03/07/24 0500   Appointment Info   Signing clinician's name / credentials Jaylene Alex, PT, OCS / Christina Em, SPT   Total/Authorized Visits eval & treat   Visits Used 1   Medical Diagnosis Urinary frequency (R35.0)  - Primary   PT Tx Diagnosis Pelvic Floor Muscle Dysfunction   Other pertinent information hx of PSVT, currently using estradoil (3x/wk)   Quick Adds Certification;Pelvic Consent   Progress Note/Certification   Start of Care Date 03/07/24   Onset of illness/injury or Date of Surgery 01/03/24  (date of referral)   Therapy Frequency 1x per week, taper as clinically appropriate   Predicted Duration 12 weeks   Certification date from 03/07/24   Certification date to 06/04/24   Progress Note Due Date 05/05/24   GOALS   PT Goals 2   PT Goal 1   Goal Identifier Urinary Urgency Reduction   Goal Description Pt will self-report consistently utilizing the restroom for urination no more frequently than every 2-4hrs daytime & 1x per night in order to participate fully in work & household tasks.   Rationale to maximize safety and independence with performance of ADLs and functional tasks;to maximize safety and independence within the home;to maximize safety and independence within the community;to maximize safety and independence with transportation;to maximize safety and independence with self cares   Target Date 05/30/24   PT Goal 2   Goal Identifier Prolapse Management   Goal Description Pt will demonstrate prolapse of grade 1 or less in order to promote pelvic health& reduce splinting during bowel movements.   Rationale to maximize safety and independence with performance of ADLs and functional tasks;to maximize safety and independence within the home;to maximize safety and independence within the community;to maximize safety and independence with self cares   Target Date 05/30/24   Subjective Report   Subjective Report see eval   Treatment Interventions (PT)   Interventions Therapeutic  Procedure/Exercise;Therapeutic Activity   Therapeutic Procedure/Exercise   PTRx Ther Proc 1 Roll Outs Hooklying   PTRx Ther Proc 1 - Details 1x20, daily; focus on reducing engagement of glutes   PTRx Ther Proc 2 Roll Ins Hooklying   PTRx Ther Proc 2 - Details 1x20, daily; focus on reducing engagement of glutes   Therapeutic Procedures: strength, endurance, ROM, flexibility minutes (43826) 10   Therapeutic Activity   PTRx Ther Act 1 Toileting Position   PTRx Ther Act 1 - Details Pt education on toileting position to help w/ prolapse management during BM for home usage   Therapeutic Activities: dynamic activities to improve functional performance minutes (71233) 10   Ther Act 1 Bladder Diary   Ther Act 1 - Details Pt educated on purpose & method of keeping a 2-day bladder diary.   Skilled Intervention techniques to support prolapse management & urgency reduction   Patient Response/Progress pt verbalized understanding   Eval/Assessments   PT Eval, Low Complexity Minutes (48108) 45   Education   Learner/Method Patient;Demonstration;Listening;Pictures/Video;No Barriers to Learning   Education Comments Pt educated on role of PFPT for urgency UI, PT POC, & agreed upon goals.   Plan   Home program PTRx   Plan for next session urgency reduction techniques, check on HEP to see if glutes over-engaging   Comments   Pelvic Health Informed Consent Statement Discussed with patient/guardian reason for referral regarding pelvic health needs and external/internal pelvic floor muscle examination.  Opportunity provided to ask questions and verbal consent for assessment and intervention was given.   Total Session Time   Timed Code Treatment Minutes 20   Total Treatment Time (sum of timed and untimed services) 65         [facility]                                                                                   OUTPATIENT PHYSICAL THERAPY    PLAN OF TREATMENT FOR OUTPATIENT REHABILITATION   Patient's Last  Name, First Name, Lisa Muniz YOB: 1945   Provider's Name   [facility]   Medical Record No.  [id number]     Onset Date: 01/03/24 (date of referral)  Start of Care Date: 03/07/24     Medical Diagnosis:  Urinary frequency (R35.0)  - Primary      PT Treatment Diagnosis:  Pelvic Floor Muscle Dysfunction Plan of Treatment  Frequency/Duration: 1x per week, taper as clinically appropriate/ 12 weeks    Certification date from 03/07/24 to 06/04/24         See note for plan of treatment details and functional goals     Blue Lisa, PT                         I CERTIFY THE NEED FOR THESE SERVICES FURNISHED UNDER        THIS PLAN OF TREATMENT AND WHILE UNDER MY CARE     (Physician attestation of this document indicates review and certification of the therapy plan).              Referring Provider:  Vee Zaragoza    Initial Assessment  See Epic Evaluation- Start of Care Date: 03/07/24

## 2024-03-11 ENCOUNTER — THERAPY VISIT (OUTPATIENT)
Dept: PHYSICAL THERAPY | Facility: CLINIC | Age: 79
End: 2024-03-11
Attending: PHYSICIAN ASSISTANT
Payer: COMMERCIAL

## 2024-03-11 DIAGNOSIS — R35.0 URINARY FREQUENCY: Primary | ICD-10-CM

## 2024-03-11 PROCEDURE — 97110 THERAPEUTIC EXERCISES: CPT | Mod: GP

## 2024-03-11 PROCEDURE — 97530 THERAPEUTIC ACTIVITIES: CPT | Mod: GP

## 2024-03-25 ENCOUNTER — ANCILLARY PROCEDURE (OUTPATIENT)
Dept: GENERAL RADIOLOGY | Facility: CLINIC | Age: 79
End: 2024-03-25
Attending: INTERNAL MEDICINE
Payer: COMMERCIAL

## 2024-03-25 ENCOUNTER — OFFICE VISIT (OUTPATIENT)
Dept: INTERNAL MEDICINE | Facility: CLINIC | Age: 79
End: 2024-03-25
Payer: COMMERCIAL

## 2024-03-25 VITALS
WEIGHT: 133.8 LBS | HEART RATE: 65 BPM | OXYGEN SATURATION: 98 % | TEMPERATURE: 97.8 F | SYSTOLIC BLOOD PRESSURE: 136 MMHG | BODY MASS INDEX: 21.6 KG/M2 | DIASTOLIC BLOOD PRESSURE: 75 MMHG

## 2024-03-25 DIAGNOSIS — R23.2 HOT FLASHES: Primary | ICD-10-CM

## 2024-03-25 DIAGNOSIS — I10 ESSENTIAL HYPERTENSION, BENIGN: ICD-10-CM

## 2024-03-25 DIAGNOSIS — R23.2 HOT FLASHES: ICD-10-CM

## 2024-03-25 LAB
ANION GAP SERPL CALCULATED.3IONS-SCNC: 8 MMOL/L (ref 7–15)
BUN SERPL-MCNC: 15.7 MG/DL (ref 8–23)
CALCIUM SERPL-MCNC: 9.7 MG/DL (ref 8.8–10.2)
CHLORIDE SERPL-SCNC: 106 MMOL/L (ref 98–107)
CREAT SERPL-MCNC: 0.72 MG/DL (ref 0.51–0.95)
DEPRECATED HCO3 PLAS-SCNC: 26 MMOL/L (ref 22–29)
EGFRCR SERPLBLD CKD-EPI 2021: 84 ML/MIN/1.73M2
ERYTHROCYTE [DISTWIDTH] IN BLOOD BY AUTOMATED COUNT: 13 % (ref 10–15)
GLUCOSE SERPL-MCNC: 102 MG/DL (ref 70–99)
HCT VFR BLD AUTO: 43.2 % (ref 35–47)
HGB BLD-MCNC: 14.2 G/DL (ref 11.7–15.7)
MCH RBC QN AUTO: 30.3 PG (ref 26.5–33)
MCHC RBC AUTO-ENTMCNC: 32.9 G/DL (ref 31.5–36.5)
MCV RBC AUTO: 92 FL (ref 78–100)
PLATELET # BLD AUTO: 331 10E3/UL (ref 150–450)
POTASSIUM SERPL-SCNC: 4.8 MMOL/L (ref 3.4–5.3)
RBC # BLD AUTO: 4.69 10E6/UL (ref 3.8–5.2)
SODIUM SERPL-SCNC: 140 MMOL/L (ref 135–145)
T4 FREE SERPL-MCNC: 1.12 NG/DL (ref 0.9–1.7)
TSH SERPL DL<=0.005 MIU/L-ACNC: 5.81 UIU/ML (ref 0.3–4.2)
WBC # BLD AUTO: 7.6 10E3/UL (ref 4–11)

## 2024-03-25 PROCEDURE — 80048 BASIC METABOLIC PNL TOTAL CA: CPT | Performed by: INTERNAL MEDICINE

## 2024-03-25 PROCEDURE — 85027 COMPLETE CBC AUTOMATED: CPT | Performed by: INTERNAL MEDICINE

## 2024-03-25 PROCEDURE — 84439 ASSAY OF FREE THYROXINE: CPT | Performed by: INTERNAL MEDICINE

## 2024-03-25 PROCEDURE — 71046 X-RAY EXAM CHEST 2 VIEWS: CPT | Mod: TC | Performed by: STUDENT IN AN ORGANIZED HEALTH CARE EDUCATION/TRAINING PROGRAM

## 2024-03-25 PROCEDURE — 36415 COLL VENOUS BLD VENIPUNCTURE: CPT | Performed by: INTERNAL MEDICINE

## 2024-03-25 PROCEDURE — 84443 ASSAY THYROID STIM HORMONE: CPT | Performed by: INTERNAL MEDICINE

## 2024-03-25 PROCEDURE — 99214 OFFICE O/P EST MOD 30 MIN: CPT | Performed by: INTERNAL MEDICINE

## 2024-03-25 NOTE — PROGRESS NOTES
"  Assessment & Plan     Hot flashes  Atypical hot flash symptoms without focal cause or source.  No evidence of infectious etiology.  No focal changes on exam.  Etiology unclear.  Suggest chest x-ray, CBC and basic metabolic panel.  No focal red flag concerns on exam or history.  - CBC with platelets; Future  - XR Chest 2 Views; Future  - Basic metabolic panel  (Ca, Cl, CO2, Creat, Gluc, K, Na, BUN); Future    Essential hypertension, benign  Stable at present time continue with current medical management.  - check TSH with free T4 reflex; Future    Advised patient to make sure she is eating something prior to bedtime to exclude hypoglycemia as a cause and source.  Advised patient to check blood pressure at home particularly during events if symptoms worsen to rule out hypertension as a cause and source.  Follow-up if symptoms change or worsen.      Jina Gonzalez is a 78 year old, presenting for the following health issues:  No chief complaint on file.    HPI     Patient is new to me having never been seen prior at this clinic.  It appears patient has been seen at the [facility] in the past.      Patient states that over the last week she has had an episode where she wakes up in the early morning hours feeling like she is having a hot flash.  Symptoms her somewhat vague.  She questions whether or not she may be having some \"episodes\" during the night but subsequently does not wake up from these until recently.  She describes her symptoms as more of a feeling like she is getting an IV infusion.  She has no symptoms or any changes throughout the day or into the evening.  She denies any distinct weight loss, fevers or chills.  She has not been exposed to anybody with any recent illness and denies typical COVID, RSV or influenza like symptoms.  She home tested negative for COVID.  She denies headache,  numbness, tingling, speech changes, urination difficulty or any focal neurologic change.  She denies any " medication changes.    She states that she is also had a history of SVT in the past but has not had major issues or concerns in regards to this arrhythmia for quite some time.  She also manifests no systemic symptoms inclusive of lightheadedness or palpitations.    She has been seen extensively via urology with multiple virtual visits for complaints of urinary frequency and dysuria with her last 2 urine cultures being essentially benign but denies any change in her baseline from her urinary type complaints as noted prior.    She does not check her blood pressure at home.  She denies issues with hypoglycemia.  She denies any recent medication changes.    It appears that she has been on lorazepam in the past but upon close questioning denies that she is having any issues with anxiety or panic attacks in the early evening or late evening hours.    Current Outpatient Medications   Medication    alendronate (FOSAMAX) 70 MG tablet    amLODIPine (NORVASC) 5 MG tablet    aspirin 81 MG tablet    carvedilol (COREG) 12.5 MG tablet    Cholecalciferol (VITAMIN D) 2000 UNITS tablet    estradiol (ESTRACE VAGINAL) 0.1 MG/GM vaginal cream    fish oil-omega-3 fatty acids (OMEGA 3) 1000 MG capsule    Lactobacillus (PROBIOTIC ACIDOPHILUS) CAPS    LORazepam (ATIVAN) 0.5 MG tablet    Lutein 20 MG TABS    mirabegron (MYRBETRIQ) 25 MG 24 hr tablet    Multiple Vitamins-Minerals (EYE VITAMINS & MINERALS) TABS    omeprazole (PRILOSEC) 20 MG DR capsule    simvastatin (ZOCOR) 20 MG tablet    vitamin C w/RADHA HIPS 500 MG tablet    zinc gluconate 50 MG tablet     No current facility-administered medications for this visit.         Review of Systems  CONSTITUTIONAL: NEGATIVE for fever, change in weight  EYES: NEGATIVE for vision changes or irritation  ENT/MOUTH: NEGATIVE for ear, mouth and throat problems  RESP: NEGATIVE for significant cough or SOB  CV: NEGATIVE for chest pain, palpitations or peripheral edema  GI: NEGATIVE for nausea, abdominal  pain, heartburn, or change in bowel habits  MUSCULOSKELETAL: NEGATIVE for significant arthralgias or myalgia  HEME: NEGATIVE for bleeding problems  PSYCHIATRIC: NEGATIVE for changes in mood or affect      Objective    /75 (Cuff Size: Adult Regular)   Pulse 65   Temp 97.8  F (36.6  C) (Tympanic)   Wt 60.7 kg (133 lb 12.8 oz)   LMP  (LMP Unknown)   SpO2 98%   BMI 21.60 kg/m    Body mass index is 21.6 kg/m .    Physical Exam:    GENERAL: alert and no distress  EYES: Eyes grossly normal to inspection, PERRL and conjunctivae and sclerae normal  HENT: HA in place, ear canals and TM's normal, nose and mouth without ulcers or lesions  NECK: no adenopathy, no asymmetry, masses, or scars  RESP: lungs clear to auscultation - no rales, rhonchi or wheezes  CV: regular rate and rhythm, normal S1 S2, no S3 or S4, no click or rub, no peripheral edema  MS: no gross musculoskeletal defects noted, no edema  NEURO: No focal changes, her speech is fluent.  Cranial nerves II through XII are intact.  Her gait is non-ataxic.  Strength to the upper and lower extremities is grossly within normal range.  She is alert,  oriented and appropriate.  PSYCH: mentation appears normal, affect normal/bright        Signed Electronically by: Matt Austin MD

## 2024-03-29 ENCOUNTER — MYC REFILL (OUTPATIENT)
Dept: INTERNAL MEDICINE | Facility: CLINIC | Age: 79
End: 2024-03-29
Payer: COMMERCIAL

## 2024-03-29 DIAGNOSIS — K21.00 GASTROESOPHAGEAL REFLUX DISEASE WITH ESOPHAGITIS WITHOUT HEMORRHAGE: ICD-10-CM

## 2024-04-02 DIAGNOSIS — M81.0 OSTEOPOROSIS, UNSPECIFIED OSTEOPOROSIS TYPE, UNSPECIFIED PATHOLOGICAL FRACTURE PRESENCE: ICD-10-CM

## 2024-04-02 RX ORDER — ALENDRONATE SODIUM 70 MG/1
TABLET ORAL
Qty: 12 TABLET | Refills: 3 | OUTPATIENT
Start: 2024-04-02

## 2024-04-03 ENCOUNTER — MYC MEDICAL ADVICE (OUTPATIENT)
Dept: INTERNAL MEDICINE | Facility: CLINIC | Age: 79
End: 2024-04-03

## 2024-04-03 ENCOUNTER — VIRTUAL VISIT (OUTPATIENT)
Dept: UROLOGY | Facility: CLINIC | Age: 79
End: 2024-04-03
Payer: COMMERCIAL

## 2024-04-03 DIAGNOSIS — R73.09 ELEVATED GLUCOSE: ICD-10-CM

## 2024-04-03 DIAGNOSIS — R35.0 URINARY FREQUENCY: Primary | ICD-10-CM

## 2024-04-03 DIAGNOSIS — R79.89 ELEVATED TSH: Primary | ICD-10-CM

## 2024-04-03 DIAGNOSIS — Z87.440 HISTORY OF UTI: ICD-10-CM

## 2024-04-03 DIAGNOSIS — R94.6 ABNORMAL RESULTS OF THYROID FUNCTION STUDIES: ICD-10-CM

## 2024-04-03 PROCEDURE — 99212 OFFICE O/P EST SF 10 MIN: CPT | Mod: 95 | Performed by: PHYSICIAN ASSISTANT

## 2024-04-03 NOTE — TELEPHONE ENCOUNTER
Please see patient's mychart message below.      Please advise, thanks.    Omar Huertas, Triage RN [facility]  10:39 AM 4/3/2024

## 2024-04-03 NOTE — PROGRESS NOTES
Video-Visit Details    Type of service:  Video Visit     Originating Location (pt. Location): Home    Distant Location (provider location):  On-site  Platform used for Video Visit: Meri  Start time: 1015am  End time: 1027am    CC: UTI symptoms    HPI:  Lisa Lopez is a very pleasant 78 year old female who presents in follow-up of the above.   Initially seen 9/29/23: Last +UC in 2021 (e coli). Has had multiple e-Visits recently and treated with abx without culture. Has had courses of Omnicef, Bactrim, Macrobid. Also treated for vaginal candida.     Notes burning, freq, small vol, sense of incomplete emptying intermittently. Nocturia x 1-3. Symptoms improve quickly with abx.     C diff in 2001.     1/3/23  Nocturia x 3-4  First void more difficult in AM--needs to void frequently to feel empty.   UC x 3 in interim:  10/10/23: >100k e coli, 11/6/23: no growth, 12/22/23: low colony mixed emma  Continues with Vit C once daily and estrogen cream (has helped with some of the vaginal and urethral discomfort).     TODAY 4/3/24  Started having hot flashes after starting Mirabegron/topical estrogen.  Has stopped both of these for now and hot flashes resolved.   TSH was elevated recently.   Started PFPT, had an incident with a large dog and resulted in back/SI pain.     Past Medical History:   Diagnosis Date    Cough 4/15/2014    Dysthymia 4/15/2014    Essential hypertension, benign 4/15/2014    Hypercholesteremia 4/15/2014    Paroxysmal supraventricular tachycardia (H24) 4/15/2014       Past Surgical History:   Procedure Laterality Date    ABDOMEN SURGERY  1972    Tubal ligation    APPENDECTOMY  Unsure    Removed when had vaginal hysterectomy.    COLONOSCOPY N/A 3/23/2017    Procedure: COMBINED COLONOSCOPY, SINGLE OR MULTIPLE BIOPSY/POLYPECTOMY BY BIOPSY;  Surgeon: Devante Sanchez MD;  Location:  GI    HYSTERECTOMY, PAP NO LONGER INDICATED         Social History     Socioeconomic History    Marital  status:      Spouse name: Not on file    Number of children: Not on file    Years of education: Not on file    Highest education level: Not on file   Occupational History     Employer: RETIRED   Tobacco Use    Smoking status: Former     Years: 5     Types: Cigarettes     Start date: 1963     Quit date: 1968     Years since quittin.2    Smokeless tobacco: Never    Tobacco comments:     Smoked [age] yo.   Vaping Use    Vaping Use: Never used   Substance and Sexual Activity    Alcohol use: Yes     Alcohol/week: 0.0 standard drinks of alcohol     Comment: 1-2 glasses of wine or beer a week.    Drug use: No    Sexual activity: Yes     Partners: Male   Other Topics Concern     Service Not Asked    Blood Transfusions Not Asked    Caffeine Concern No     Comment: not caffeine     Occupational Exposure Not Asked    Hobby Hazards Not Asked    Sleep Concern Not Asked    Stress Concern Not Asked    Weight Concern Not Asked    Special Diet No     Comment: regular diet     Back Care Not Asked    Exercise Yes     Comment: 3 times a week at gym     Bike Helmet Not Asked    Seat Belt Not Asked    Self-Exams Not Asked    Parent/sibling w/ CABG, MI or angioplasty before 65F 55M? Yes     Comment: Mother; heart attack age 53. brother Renato age 53 angioplast   Social History Narrative    Not on file     Social Determinants of Health     Financial Resource Strain: Not on file   Food Insecurity: Not on file   Transportation Needs: Not on file   Physical Activity: Not on file   Stress: Not on file   Social Connections: Not on file   Interpersonal Safety: Not on file   Housing Stability: Not on file       Family History   Problem Relation Age of Onset    Diabetes Father 60         66 yo    Asthma Father         Mild    Heart Disease Mother          66 yo     Hypertension Mother         , heart attack, age 67    Hyperlipidemia Mother     Heart Disease Brother          75 yo     Heart Disease Brother          76 yo    Heart Disease Brother          76 yo     Heart Disease Brother 67        Triple bypass    Family History Negative Sister          62 yo MVA     Family History Negative Sister     Family History Negative Daughter     Family History Negative Daughter     Diabetes Sister     Hypertension Sister         , car accident, age 61    Hyperlipidemia Sister     Diabetes Brother     Hypertension Brother         , car accident, age 61    Hyperlipidemia Brother     Hypertension Sister     Hyperlipidemia Sister     Hypertension Brother          following heart surgery, age 77    Hyperlipidemia Brother     Hypertension Brother     Hyperlipidemia Brother     Hypertension Brother         ,stroke after heart surgery    Hyperlipidemia Brother     Cerebrovascular Disease Brother         After heart surgery, from stroke, age 77    Other Cancer Daughter         Brain tumor, ; surgery.    Colon Cancer No family hx of        Allergies   Allergen Reactions    Bupropion      Night sweats    Sertraline      Dizziness and hot flashes       Current Outpatient Medications   Medication Sig Dispense Refill    alendronate (FOSAMAX) 70 MG tablet TAKE 1 TABLET(70 MG) BY MOUTH EVERY 7 DAYS 12 tablet 3    amLODIPine (NORVASC) 5 MG tablet TAKE 1 TABLET(5 MG) BY MOUTH DAILY 90 tablet 3    aspirin 81 MG tablet Take 81 mg by mouth daily  30 tablet     carvedilol (COREG) 12.5 MG tablet TAKE 1/2 TABLET(6.25 MG) BY MOUTH TWICE DAILY WITH MEALS 90 tablet 1    Cholecalciferol (VITAMIN D) 2000 UNITS tablet Take 2,000 Units by mouth daily 100 tablet 3    estradiol (ESTRACE VAGINAL) 0.1 MG/GM vaginal cream Apply small amount to the vaginal opening and urethra M, W, F @ h.s. 42.5 g 3    fish oil-omega-3 fatty acids (OMEGA 3) 1000 MG capsule Take 1 g by mouth 2 times daily  90 capsule     Lactobacillus (PROBIOTIC ACIDOPHILUS) CAPS       LORazepam (ATIVAN) 0.5 MG  tablet TAKE 1/2 TABLET(0.25 MG) BY MOUTH DAILY 45 tablet 0    Lutein 20 MG TABS       mirabegron (MYRBETRIQ) 25 MG 24 hr tablet Take 1 tablet (25 mg) by mouth daily 90 tablet 4    Multiple Vitamins-Minerals (EYE VITAMINS & MINERALS) TABS       omeprazole (PRILOSEC) 20 MG DR capsule Take 1 capsule (20 mg) by mouth daily 90 capsule 3    simvastatin (ZOCOR) 20 MG tablet TAKE 1/2 TABLET BY MOUTH EVERY NIGHT AT BEDTIME 90 tablet 2    vitamin C w/RADHA HIPS 500 MG tablet Take 1 tablet (500 mg) by mouth daily      zinc gluconate 50 MG tablet Take 1 tablet (50 mg) by mouth daily       No current facility-administered medications for this visit.         PEx:   not currently breastfeeding.    PSYCH: NAD  EYES: EOMI  MOUTH: MMM  NEURO: AAO x3      A/P: Lisarolando Lopez is a 78 year old female with UTI symptoms (Bacterial vs irritative).  -UA/UC prior to an abx course given C diff hx  -Estrogen cream three times a week near urethra (pea-sized amount) can be restarted alone if irritative symptoms.   -PFPT to continue  -Stop mirabegron; did not tolerate.   -follow-up with Dr. Durham about elevated TSH.  -follow-up PRN        Vee Zaragoza PA-C  [facility]        25 minutes spent on the date of the encounter doing chart review, review of outside records, review of test results, interpretation of tests, patient visit and documentation

## 2024-04-03 NOTE — NURSING NOTE
Is the patient currently in the state of [place]? YES    Visit mode:VIDEO    If the visit is dropped, the patient can be reconnected by: VIDEO VISIT: Send to e-mail at: [email or web address]    Will anyone else be joining the visit? NO  (If patient encounters technical issues they should call [phone]    How would you like to obtain your AVS? MyChart    Are changes needed to the allergy or medication list? No    Reason for visit: Follow Up    Jayla SOLIS

## 2024-04-25 ASSESSMENT — PATIENT HEALTH QUESTIONNAIRE - PHQ9
SUM OF ALL RESPONSES TO PHQ QUESTIONS 1-9: 1
10. IF YOU CHECKED OFF ANY PROBLEMS, HOW DIFFICULT HAVE THESE PROBLEMS MADE IT FOR YOU TO DO YOUR WORK, TAKE CARE OF THINGS AT HOME, OR GET ALONG WITH OTHER PEOPLE: NOT DIFFICULT AT ALL
SUM OF ALL RESPONSES TO PHQ QUESTIONS 1-9: 1

## 2024-04-26 ENCOUNTER — OFFICE VISIT (OUTPATIENT)
Dept: INTERNAL MEDICINE | Facility: CLINIC | Age: 79
End: 2024-04-26
Payer: COMMERCIAL

## 2024-04-26 VITALS
TEMPERATURE: 97.3 F | DIASTOLIC BLOOD PRESSURE: 70 MMHG | OXYGEN SATURATION: 100 % | HEART RATE: 61 BPM | BODY MASS INDEX: 21.47 KG/M2 | SYSTOLIC BLOOD PRESSURE: 116 MMHG | WEIGHT: 133 LBS

## 2024-04-26 DIAGNOSIS — F41.9 ANXIETY: ICD-10-CM

## 2024-04-26 DIAGNOSIS — Z00.00 PREVENTATIVE HEALTH CARE: ICD-10-CM

## 2024-04-26 DIAGNOSIS — E03.8 SUBCLINICAL HYPOTHYROIDISM: Primary | ICD-10-CM

## 2024-04-26 PROCEDURE — 99214 OFFICE O/P EST MOD 30 MIN: CPT | Performed by: INTERNAL MEDICINE

## 2024-04-26 RX ORDER — RESPIRATORY SYNCYTIAL VIRUS VACCINE 120MCG/0.5
0.5 KIT INTRAMUSCULAR ONCE
Qty: 1 EACH | Refills: 0 | Status: CANCELLED | OUTPATIENT
Start: 2024-04-26 | End: 2024-04-26

## 2024-04-26 NOTE — PROGRESS NOTES
Assessment & Plan   Subclinical hypothyroidism  I discussed that her TSH of 5.8 is normal for her age, especially in the setting of a normal free T4.  I recommend against treatment given the normality of these labs.  She can continue to monitor them annually with her primary team if she so chooses.    Anxiety  I discussed that I do not prescribe benzodiazepines as part of my practice.  We had a constructive conversation about why that is, and I offered to discuss other possible medication options with this patient (such as other SSRIs or SNRIs).  Given she has had a reaction to those 2 classes of medications in the past, coupled with her feeling she does not have much anxiety or depression at this time, she prefers to just stop the lorazepam and see how she does.  I encouraged her to follow-up with her primary team if she feels like she does need medication for these issues.    Preventative health care  I discussed the updated guidelines surrounding low-dose aspirin for primary prevention.  She does report a significant family history of ASCVD, however she did have a recent bleeding event with a nosebleed that was so severe that she had to present to the ER.  I discussed that an increased bleeding risk was found in these recent studies for primary prevention, especially in the elderly.  Discussed that current guidelines recommend that she stop her low-dose aspirin for primary prevention.    F/u with regular PCP at regular interval or sooner PRN    Signed Electronically by:  Peter Jacob MD, MPH  [facility]  Internal Medicine    Subjective   Lisa is a 78 year old who presents today to discuss recent lab results.  She follows with Dr. Durham in our [place] clinic, but was unable to get an appointment at her preferred clinic and presents today in the clinic to follow-up on lab results done by Dr. Austin in our clinic last month.  At that time, her TSH was found to be 5.81 with a  normal free T4.  She is wondering what to do about this.  She is also requesting a refill of her lorazepam prescription.  She reports she has been on this for 20+ years and was started on a due to significant life events that caused a significant amount of grief.  She has had reactions to SSRIs and SNRIs in the past per chart review.  She denies any personal history of heart attack or stroke, but she was in the ER recently for a notable nosebleed.      Objective    /70   Pulse 61   Temp 97.3  F (36.3  C)   Wt 60.3 kg (133 lb)   LMP  (LMP Unknown)   SpO2 100%   BMI 21.47 kg/m    Body mass index is 21.47 kg/m .    Physical Exam   GENERAL: alert and in no distress.  EYES: conjunctivae/corneas clear. EOMs grossly intact  HENT: Facies symmetric.  RESP: No iWOB.  MSK: Moves all four extremities freely  SKIN: No significant ulcers, lesions, or rashes on the visualized portions of the skin  NEURO: CN II-XII grossly intact.

## 2024-04-29 ENCOUNTER — THERAPY VISIT (OUTPATIENT)
Dept: PHYSICAL THERAPY | Facility: CLINIC | Age: 79
End: 2024-04-29
Payer: COMMERCIAL

## 2024-04-29 DIAGNOSIS — R35.0 URINARY FREQUENCY: Primary | ICD-10-CM

## 2024-04-29 PROCEDURE — 97530 THERAPEUTIC ACTIVITIES: CPT | Mod: GP | Performed by: PHYSICAL THERAPIST

## 2024-04-29 PROCEDURE — 97110 THERAPEUTIC EXERCISES: CPT | Mod: GP | Performed by: PHYSICAL THERAPIST

## 2024-04-30 PROBLEM — R35.0 URINARY FREQUENCY: Status: ACTIVE | Noted: 2024-04-30

## 2024-05-14 ENCOUNTER — PATIENT OUTREACH (OUTPATIENT)
Dept: CARE COORDINATION | Facility: CLINIC | Age: 79
End: 2024-05-14
Payer: COMMERCIAL

## 2024-05-20 ENCOUNTER — OFFICE VISIT (OUTPATIENT)
Dept: PODIATRY | Facility: CLINIC | Age: 79
End: 2024-05-20
Payer: COMMERCIAL

## 2024-05-20 VITALS
HEIGHT: 66 IN | SYSTOLIC BLOOD PRESSURE: 116 MMHG | DIASTOLIC BLOOD PRESSURE: 68 MMHG | WEIGHT: 132 LBS | BODY MASS INDEX: 21.21 KG/M2

## 2024-05-20 DIAGNOSIS — M20.5X1 HALLUX LIMITUS, RIGHT: Primary | ICD-10-CM

## 2024-05-20 DIAGNOSIS — M20.42 ACQUIRED HAMMERTOE OF LEFT FOOT: ICD-10-CM

## 2024-05-20 PROCEDURE — 99203 OFFICE O/P NEW LOW 30 MIN: CPT | Performed by: PODIATRIST

## 2024-05-20 NOTE — PROGRESS NOTES
"Foot & Ankle Surgery  May 20, 2024    CC: \"Toe on left foot painful\"    I was asked to see Lisa Lopez regarding the chief complaint by:  Dr. ALEXANDER Ramos    HPI:  Pt is a 78 year old female who presents with above complaint.  Left fourth toe pain and right great toe joint pain.  This is been problematic \"a couple months\" although I saw her in 2014 for right hallux limitus and she underwent a steroid injection that day.  The main concern is the left fourth toe which is curling under the adjacent third toe.  Pain with \"walking or prolonged standing\", worse with \"prolonged standing and walking\".  5 out of 10 pain.  \"Nothing\" for treatment    ROS:   Pos for CC.  The patient denies current nausea, vomiting, chills, fevers, belly pain, calf pain, chest pain or SOB.  Complete remainder of ROS is otherwise neg.    VITALS:    Vitals:    05/20/24 1058   BP: 116/68   Weight: 59.9 kg (132 lb)   Height: 1.676 m (5' 6\")       PMH:    Past Medical History:   Diagnosis Date    Cough 4/15/2014    Dysthymia 4/15/2014    Essential hypertension, benign 4/15/2014    Hypercholesteremia 4/15/2014    Paroxysmal supraventricular tachycardia (H24) 4/15/2014       SXHX:    Past Surgical History:   Procedure Laterality Date    ABDOMEN SURGERY  1972    Tubal ligation    APPENDECTOMY  Unsure    Removed when had vaginal hysterectomy.    COLONOSCOPY N/A 3/23/2017    Procedure: COMBINED COLONOSCOPY, SINGLE OR MULTIPLE BIOPSY/POLYPECTOMY BY BIOPSY;  Surgeon: Devante Sanchez MD;  Location: [facility]    HYSTERECTOMY, PAP NO LONGER INDICATED          MEDS:    Current Outpatient Medications   Medication Sig Dispense Refill    alendronate (FOSAMAX) 70 MG tablet TAKE 1 TABLET(70 MG) BY MOUTH EVERY 7 DAYS 12 tablet 3    amLODIPine (NORVASC) 5 MG tablet TAKE 1 TABLET(5 MG) BY MOUTH DAILY 90 tablet 3    carvedilol (COREG) 12.5 MG tablet TAKE 1/2 TABLET(6.25 MG) BY MOUTH TWICE DAILY WITH MEALS 90 tablet 1    Cholecalciferol (VITAMIN D) 2000 " UNITS tablet Take 2,000 Units by mouth daily 100 tablet 3    estradiol (ESTRACE VAGINAL) 0.1 MG/GM vaginal cream Apply small amount to the vaginal opening and urethra M, W, F @ h.s. 42.5 g 3    fish oil-omega-3 fatty acids (OMEGA 3) 1000 MG capsule Take 1 g by mouth 2 times daily  90 capsule     Lactobacillus (PROBIOTIC ACIDOPHILUS) CAPS       Lutein 20 MG TABS       Multiple Vitamins-Minerals (EYE VITAMINS & MINERALS) TABS       omeprazole (PRILOSEC) 20 MG DR capsule Take 1 capsule (20 mg) by mouth daily 90 capsule 3    simvastatin (ZOCOR) 20 MG tablet TAKE 1/2 TABLET BY MOUTH EVERY NIGHT AT BEDTIME 90 tablet 2    vitamin C w/RADHA HIPS 500 MG tablet Take 1 tablet (500 mg) by mouth daily      zinc gluconate 50 MG tablet Take 1 tablet (50 mg) by mouth daily       No current facility-administered medications for this visit.       ALL:     Allergies   Allergen Reactions    Bupropion      Night sweats    Sertraline      Dizziness and hot flashes       FMH:    Family History   Problem Relation Age of Onset    Diabetes Father 60         68 yo    Asthma Father         Mild    Heart Disease Mother          68 yo     Hypertension Mother         , heart attack, age 67    Hyperlipidemia Mother     Heart Disease Brother          75 yo    Heart Disease Brother          76 yo    Heart Disease Brother          76 yo     Heart Disease Brother 67        Triple bypass    Family History Negative Sister          60 yo MVA     Family History Negative Sister     Family History Negative Daughter     Family History Negative Daughter     Diabetes Sister     Hypertension Sister         , car accident, age 61    Hyperlipidemia Sister     Diabetes Brother     Hypertension Brother         , car accident, age 61    Hyperlipidemia Brother     Hypertension Sister     Hyperlipidemia Sister     Hypertension Brother          following heart surgery, age 77     Hyperlipidemia Brother     Hypertension Brother     Hyperlipidemia Brother     Hypertension Brother         ,stroke after heart surgery    Hyperlipidemia Brother     Cerebrovascular Disease Brother         After heart surgery, from stroke, age 77    Other Cancer Daughter         Brain tumor, ; surgery.    Colon Cancer No family hx of        SocHx:    Social History     Socioeconomic History    Marital status:      Spouse name: Not on file    Number of children: Not on file    Years of education: Not on file    Highest education level: Not on file   Occupational History     Employer: RETIRED   Tobacco Use    Smoking status: Former     Current packs/day: 0.00     Types: Cigarettes     Start date: 1963     Quit date: 1968     Years since quittin.3    Smokeless tobacco: Never    Tobacco comments:     Smoked [age] yo.   Vaping Use    Vaping status: Never Used   Substance and Sexual Activity    Alcohol use: Yes     Alcohol/week: 0.0 standard drinks of alcohol     Comment: 1-2 glasses of wine or beer a week.    Drug use: No    Sexual activity: Yes     Partners: Male   Other Topics Concern     Service Not Asked    Blood Transfusions Not Asked    Caffeine Concern No     Comment: not caffeine     Occupational Exposure Not Asked    Hobby Hazards Not Asked    Sleep Concern Not Asked    Stress Concern Not Asked    Weight Concern Not Asked    Special Diet No     Comment: regular diet     Back Care Not Asked    Exercise Yes     Comment: 3 times a week at gym     Bike Helmet Not Asked    Seat Belt Not Asked    Self-Exams Not Asked    Parent/sibling w/ CABG, MI or angioplasty before 65F 55M? Yes     Comment: Mother; heart attack age 53. brother Renato age 53 angioplast   Social History Narrative    Not on file     Social Determinants of Health     Financial Resource Strain: Low Risk  (2024)    Financial Resource Strain     Within the past 12 months, have you or your family members  you live with been unable to get utilities (heat, electricity) when it was really needed?: No   Food Insecurity: Low Risk  (4/19/2024)    Food Insecurity     Within the past 12 months, did you worry that your food would run out before you got money to buy more?: No     Within the past 12 months, did the food you bought just not last and you didn t have money to get more?: No   Transportation Needs: Low Risk  (4/19/2024)    Transportation Needs     Within the past 12 months, has lack of transportation kept you from medical appointments, getting your medicines, non-medical meetings or appointments, work, or from getting things that you need?: No   Physical Activity: Not on file   Stress: Not on file   Social Connections: Not on file   Interpersonal Safety: Not on file   Housing Stability: Low Risk  (4/19/2024)    Housing Stability     Do you have housing? : Yes     Are you worried about losing your housing?: No           EXAMINATION:  Gen:   No apparent distress  Neuro:   A&Ox3, no deficits  Psych:    Answering questions appropriately for age and situation with normal affect  Head:    NCAT  Eye:    Visual scanning without deficit  Ear:    Response to auditory stimuli wnl  Lung:    Non-labored breathing on RA noted  Abd:    NTND per patient report  Lymph:    Neg for pitting/non-pitting edema BLE  Vasc:    Pulses palpable, CFT minimally delayed  Neuro:    Light touch sensation intact to all sensory nerve distributions without paresthesias  Derm:    Neg for nodules, lesions or ulcerations  MSK:    Adductovarus left fourth hammertoe, nearly fully reducible.  No MPJ pain seen.  Right first MPJ pain/arthritis, crepitus with passive range of motion which is less than 40 degrees.  Calf:    Neg for redness, swelling or tenderness    Assessment:  78 year old female with reducible adductovarus left fourth hammertoe; right hallux limitus      Medical Decision Making/Plan:  Discussed etiologies, anatomy and options  1.  Reducible  adductovarus left fourth hammertoe  -I personally reviewed and interpreted the patient's lower extremity history pertinent to today's visit, including imaging/labs, in preparation for initiating a treatment program.  -Our hammertoe handout was dispensed  -Comfortable accommodative shoes  -Our toe pad/spacer/splint handout was dispensed  -If this fails to provide sufficient relief, consider percutaneous flexor tenotomy.  A copy the post procedure instructions were dispensed for patient information and planning.  If she would like to proceed, she will schedule a 30-minute appointment.  We discussed that if this fails to provide sufficient relief, a formal hammertoe procedure would be considered      2.  Hallux limitus right foot  -Our hallux limitus handout was dispensed  -Comfortable stiff soled shoes  -OTC insert for arch support and stress relief on joint  -RICE/NSAID versus Tylenol as needed based on pain  -The next option to consider would be a steroid injection in the joint.  This can be done during a 15-minute clinic appointment      Follow up:  prn or sooner with acute issues      Patient's medical history was reviewed today      Donta Phoenix DPM FACFAS FACFAOM  Podiatric Foot & Ankle Surgeon  [facility]  [phone]    Disclaimer: This note consists of symbols derived from keyboarding, dictation and/or voice recognition software. As a result, there may be errors in the script that have gone undetected. Please consider this when interpreting information found in this chart.

## 2024-05-20 NOTE — PATIENT INSTRUCTIONS
Thank you for choosing [facility] Podiatry / Foot & Ankle Surgery!    DR. SWEET'S CLINIC LOCATIONS:     [facility] (Friday) TRIAGE LINE: [phone] [address] [facility] [facility]  APPOINTMENTS: [phone]   [place]ADD[place] [address] RADIOLOGY: [phone]    PHYSICAL THERAPY: [phone]    SET UP SURGERY: [phone]   [facility] (Mon-Tues AM-Thurs) BILLING QUESTIONS: [phone] [address] FAX: [phone]   MADDY Torres [address] [place] [facility]: [phone]     You were seen today for 2 complaints, the left fourth hammertoe and the right great toe arthritis.    1.  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  Baseline therapies include comfortable accommodative shoes and pad/spacer/splint.  See below for pad options and hammertoe information.  The next step to consider if this fails to provide sufficient relief is a procedure called a percutaneous flexor tenotomy which can be done during a 30-minute clinic appointment.  See below for post procedure instructions.    2.  Right great toe joint arthritis.  See below for hallux limitus information.  Initial therapies include comfortable stiff soled shoes, inserts and resting/icing/anti-inflammatories.  The next option to consider would be an injection if initial therapies fail to provide sufficient relief.  This can be done during a 15-minute clinic appointment        HAMMERTOES  Hammertoe is a contracture (bending) of one or both joints of the second, third, fourth, or fifth (little) toes. This abnormal bending can put pressure on the toe when wearing shoes, causing problems to develop.  Hammertoes usually start out as mild deformities and get progressively worse over time. In the earlier stages, hammertoes are flexible and the symptoms can often be managed with noninvasive measures. But if left untreated, hammertoes can become more rigid and will not respond to non-surgical treatment.  Because of the  progressive nature of hammertoes, they should receive early attention. Hammertoes never get better without some kind of intervention.  CAUSES  The most common cause of hammertoe is a muscle/tendon imbalance. This imbalance, which leads to a bending of the toe, results from mechanical (structural) changes in the foot that occur over time in some people.  Hammertoes may be aggravated by shoes that don t fit properly. A hammertoe may result if a toe is too long and is forced into a cramped position when a tight shoe is worn.  Occasionally, hammertoe is the result of an earlier trauma to the toe. In some people, hammertoes are inherited.  SYMPTOMS  Pain or irritation of the affected toe when wearing shoes.   Corns and calluses (a buildup of skin) on the toe, between two toes, or on the ball of the foot. Corns are caused by constant friction against the shoe. They may be soft or hard, depending upon their location.   Inflammation, redness, or a burning sensation   Contracture of the toe   In more severe cases of hammertoe, open sores may form.   DIAGNOSIS  Although hammertoes are readily apparent, to arrive at a diagnosis the foot and ankle surgeon will obtain a thorough history of your symptoms and examine your foot. During the physical examination, the doctor may attempt to reproduce your symptoms by manipulating your foot and will study the contractures of the toes. In addition, the foot and ankle surgeon may take x-rays to determine the degree of the deformities and assess any changes that may have occurred.   Hammertoes are progressive - they don t go away by themselves and usually they will get worse over time. However, not all cases are alike - some hammertoes progress more rapidly than others. Once your foot and ankle surgeon has evaluated your hammertoes, a treatment plan can be developed that is suited to your needs.  NON-SURGICAL TREATMENT  There is a variety of treatment options for hammertoe. The treatment  your foot and ankle surgeon selects will depend upon the severity of your hammertoe and other factors.  A number of non-surgical measures can be undertaken:  Padding corns and calluses. Your foot and ankle surgeon can provide or prescribe pads designed to shield corns from irritation. If you want to try over-the-counter pads, avoid the medicated types. Medicated pads are generally not recommended because they may contain a small amount of acid that can be harmful. Consult your surgeon about this option.   Changes in shoewear. Avoid shoes with pointed toes, shoes that are too short, or shoes with high heels - conditions that can force your toe against the front of the shoe. Instead, choose comfortable shoes with a deep, roomy toe box and heels no higher than two inches.   Orthotic devices. A custom orthotic device placed in your shoe may help control the muscle/tendon imbalance.   Injection therapy. Corticosteroid injections are sometimes used to ease pain and inflammation caused by hammertoe.   Medications. Oral nonsteroidal anti-inflammatory drugs (NSAIDs), such as ibuprofen, may be recommended to reduce pain and inflammation.   Splinting/strapping. Splints or small straps may be applied by the surgeon to realign the bent toe.   Exercises:   1. The Toe Tap  Stand flat on the ground in your bare feet. Raise all of your toes up off the ground as high as you can. Then starting with the little toes, slowly press them down to the ground. After the big toes are on the ground, start over by raising all of them up off the ground again. This motion is similar to tapping your fingers on a desk. Repeat this ten times.     2. Interlocking your Fingers and Toes  Cross your right foot over your knee and place the fingers of your left hand between your toes. Squeeze your toes together, pinching your fingers between them. Spread the toes apart and squeeze them together again. Repeat this ten times then do the other foot. Like most  exercises, this will get easier the more you do it. If you are having a lot of difficulty with this exercise, start with just your index finger between your first and second toe, then later add your middle finger between your second and third toes, and so on until you can fit all your fingers between your toes. Do this ten times on each foot. Eventually you will be able to spread your toes apart without using your fingers.    3. Gripping the Floor   the floor by pressing the pads of your toes (not the tips) into the floor without curling your toes. Relax and repeat this ten times. If your shoes have the proper amount of depth, you should be able to do this with shoes on.    HAMMERTOE TOE SURGERY   Hammertoe surgery is complex. The surgical procedure is an attempt to help the toe lay in a better position. Nearly every structure in the toe will be cut including the tendons, ligaments, skin and bone. Hammertoes are a complex deformity and final toe position is difficult to predict. The only sure way to position a toe is to fuse all three digital joints. That will not happen as some degree of toe motion is needed for walking. The toe may not be completely reduced as the surrounding skin and other structures may not allow the toe to return to a normal position. The tendons on adjacent toes may need to be cut at the time of the original or subsequent surgeries, as interconnections exist between the toes. The toe may drift after surgery. Stiffness may develop leading to new areas of pressure.   Future shoe choices will be critical in allowing the surgery to provide comfort. The toes will still hurt if shoes rub. The original pain may also persist as other foot problems may be contributing to the current pain. The toe may or may not be pinned in place. External pins would require complete avoidance of water on the foot for six weeks. The pin would be removed about six weeks after the surgery. Strict attention to  "protection is critical. The pin could get bumped or loosen resulting in early removal. Removal might be necessary before the bone heals which would negatively affect the final surgical outcome and toe allignment.       www.pedifix.com or call 1-800-PEDIFIX  TOE COVERS/CAPS      www.[employer].Anywhere to Go or call 1-800-PEDIFIX  HAMMERTOE PADS / TOE SPLINTS  \      FLEXOR TENOTOMY AFTERCARE  After the procedure on your toe(s), please follow the following instructions for proper cares:    1.  Elevate the foot 23/24 hours per day, at or above hip level.  You can get do normal daily activities, ie go to the bathroom, eat, get dressed; however, keep activities to a minimum at least the first 3 days to prevent bleeding and to allow healing    2.  Ice behind the knee and to the procedure site every 2 hours x 20 minutes    3.  Keep dressing clean, dry and intact until your follow-up appointment.  If it gets wet, dirty or falls off, follow up in clinic right away for a bandage change.    4.  If no pain medication prescription was given, you can take Tylenol for pain.    5.  Please call with any questions/concerns.    DEGENERATIVE ARTHRITIS OF THE BIG TOE JOINT   (hallux limitus/hallux rigidus)   Arthritis of the joint at the base of the big toe (metatarsophalangeal joint) has several causes. Usually it results from repetitive trauma to the joint, secondary to abnormal foot mechanics. Often it is hereditary. However, a one-time traumatic event can lead to arthritis. The condition doesn't improve with time, and even with treatment, can worsen. The cartilage wears out, joint surfaces are no longer smooth, bone rubs on bone, inflammation occurs with pain, and eventually bone spurs and loose fragments might develop.   The joint is often painful with activity, worse with flimsy shoes or walking barefoot, and it slowly progresses over time. A person might notice the toe \"locking up\" with walking. There often is an obvious, and irritating, bony " bump on top of the foot. Shoes might be uncomfortable. In some people the pain is so bothersome that recreational activities sometimes even normal daily activities are difficult to perform.   The pain from this arthritis is likely a combination of joint jamming, cartilage loss and inflammation, and irritation from shoes rubbing on the bump. Sometimes other parts of the foot, leg, or back hurt from altering one's walk to compensate for the painful joint.     Ways to help a person live with the discomfort include wearing a good, supportive shoe with a rigid, rocker-type bottom. An example is a hiking boot. A rigid sole minimizes bending of the joint, and therefore, joint motion and pain. Shoes with a high toe box allow for less rubbing on the bump. Avoiding barefoot walking, sandals, flip-flops and slippers usually helps.   Sometimes an insert or orthotic provides symptom relief. This might make shoe fit more difficult. Pads over the bump and occasionally injections into the joint provide relief.   Surgery for this condition is aimed towards alleviating pain. It does not cure the arthritis nor does it guarantee better joint motion. Depending on the condition of the metatarsophalangeal joint, there are several surgiqal options:    1.  Cutting off the bony bump(s) and cleaning the joint    2.  Loosening the joint up by making cuts in the first metatarsal bone or the big toe bone and removing a small section of bone.    3.  Repositioning bone to minimize jamming of the joint.    4.  In severe cases, the joint is fused. By fusing the joint, it will never bend again. This resolves the pain, because it's the movement of a worn out joint that causes pain. Oftentimes the operation involves a combination of these procedures and. requires the use of screws, pins, and/or a small surgical plate.     Healing after surgery requires about six weeks of protection. This allows the bone to heal. Maximum recovery takes about one year.  The scar tissue and joint structures require this amount of time to finish the healing process. Expect stiffness, swelling and numbness during that time frame.   Surgery for arthritis of the metatarsophalangeal joint does involve side effects. Some side effects are predictable and others are less common but do occur. A scar will be visible and could be irritated by shoes. The shoe may rub on the screw or internal pin requiring surgical removal of these fixation devices. The screw and pin would likely be left in place for a full year. The first toe may remain stiff after surgery. The amount of stiffness is variable. Most people never regain normal motion of the first toe. This is due to scar tissue inherent to any surgery, in addition to the cumUlative effects of arthritis. Sometimes the big toe drifts to one side or the other. Joint fusion is one option to correct an unstable, drifting toe. This procedure straightens the toe, however, no motion remains.   All surgical procedures involve risk of infection, numbness, pain, delayed bone healing, osteotomy (bone cut) dislocation, blood clots, continued foot pain, etc. Arthritic joint surgery is quite complex and should not be taken lightly.    Any skin incision can lead to infection. Deep infection might involve the bone and thus repeat surgery and six weeks of IV antibiotics. Scar tissue can cause nerve pain or numbness. his is generally temporary but can be permanent. We do not have treatments that cure nerve problems. Second toe pain could be related to altered mechanics and pressure transferred to the second toe. Delayed bone healing would lengthen the healing time. Some bones simply do not heal. This requires repeat surgery, electronic bone stimulation and/or extended protection. Smokers have an approximate 20% chance of poor bone healing. This is double that of a non-smoker. The bone cut may displace. This may need to be repaired with a second operation.  Displacement can cause joint malalignment. Immobility after surgery can cause a blood clot in the legs and lungs. This could result in death.   Foot pain is complex. Most feet hurt for more than one reason. Operating on the arthritic   big toe joint will not necessarily create a pain free foot. Appropriate shoes, healthy body weight, avoidance of bare foot walking and moderation of activity will always be   necessary to enjoy foot comfort. Arthritis is incurable even with surgery.     Surgery for this type of arthritis is nevertheless quite successful. Most surgical patients are pleased with their foot following surgery. Many of the issues described above can be controlled by taking proper care of your foot during the healing process.   Cosmetic bump surgery is discouraged for the reasons listed above. A bump and joint that is comfortable when wearing appropriate shoes should simply be treated with appropriate shoes.   Your surgeon would be happy to fully describe any of the above issues. You should pursue a full understanding of the operation, recovery process and any potential problems that could develop.       Over the Counter Inserts      Super Feet are the most common and easiest to find.  Locations include any [employer], [employer] in [place] on [address] [address] and in [place] on [facility] [address], [employer] in [facility] on [address] [facility], [employer] in [facility] on [facility], [facility] Running Room in [facility] [address], [employer] in [place] on [address] and [employer] in [facility] on [address] and in [place] on [facility].    Spenco can be found online and at [employer] in [facility] on [address], [employer] in [facility] on [address], [employer] in [place] on [facility], [employer] in [place] on [address] and [employer] in [place] on [address].    Power Step can be a little harder to find.  Locations  "include [facility] on [address] in [place], [employer] in [place] on [address], [place] in [place], [employer] in [place] on [address] and online    **  A good high quality over the counter insert can cost around $30-$40.      PRICE Therapy    Many aches and pains throughout the foot and ankle can be helped with many simple treatments.  This is usually described as PRICE Therapy.      P - Protection - often times, inflammation/pain in the lower extremity is not able to improve simply because the areas involved are never allowed to rest.  Every step we take can bother the problematic area.  Protecting those areas is an important step in the healing process.  This may involve a walking cast boot, a special insert/orthotic device, an ankle brace, or simply avoiding barefoot walking.    R - Rest - in addition to protecting the foot/ankle, resting is an important, but often times difficult, treatment option.  Getting off your feet when they bother you, and specifically avoiding activities that cause pain/discomfort, are very beneficial to prevent, and treat, foot/ankle pain.  \"If there's something that makes it hurt(eg activities, shoe gear), and you keep doing the thing that makes it hurt, it's just going to keep hurting\".      I - Ice - icing regularly can help to decrease inflammation and swelling in the foot, thus decreasing pain.  Using an ice pack or a bag of frozen peas works very well.  Ice for 20 minutes multiple times per day as needed.  Do not place the ice directly on the skin as this can cause tissue damage.    C - Compression - using a compression wrap or an ACE wrap can help to decrease swelling, which can help to decrease pain.  Wearing the wraps is generally not needed at night, but they should be worn on a regular basis when you are going to be on your feet for prolonged periods as gravity tends to pull fluids down to your feet/ankles.    E - Elevation - elevating your lower " extremities multiple times daily for 15-20 minutes can help to decrease swelling, which works well in decreasing pain levels.      NSAID/Tylenol - An anti-inflammatory, like Aleve or ibuprofen, and/or a pain medication, such as Tylenol, can help to improve pain levels and get the issue resolved sooner rather than later.  Also, topical anti-inflammatory medications like Voltaren gel can be used for local treatment, with the benefit of avoiding system issues with oral medications.  Anyone with liver issues should be careful with Tylenol, and anyone with high blood pressure or heart, stomach or kidney issues should be careful with anti-inflammatories.  Please ask if you have questions about these medications, including dosage.

## 2024-05-20 NOTE — LETTER
"    5/20/2024         RE: Lisa Lopez  [address] [facility] [address]        Dear Colleague,    Thank you for referring your patient, Lisa Lopez, to the [facility]. Please see a copy of my visit note below.    Foot & Ankle Surgery  May 20, 2024    CC: \"Toe on left foot painful\"    I was asked to see Lisa Lopez regarding the chief complaint by:  Dr. ALEXANDER Ramos    HPI:  Pt is a 78 year old female who presents with above complaint.  Left fourth toe pain and right great toe joint pain.  This is been problematic \"a couple months\" although I saw her in 2014 for right hallux limitus and she underwent a steroid injection that day.  The main concern is the left fourth toe which is curling under the adjacent third toe.  Pain with \"walking or prolonged standing\", worse with \"prolonged standing and walking\".  5 out of 10 pain.  \"Nothing\" for treatment    ROS:   Pos for CC.  The patient denies current nausea, vomiting, chills, fevers, belly pain, calf pain, chest pain or SOB.  Complete remainder of ROS is otherwise neg.    VITALS:    Vitals:    05/20/24 1058   BP: 116/68   Weight: 59.9 kg (132 lb)   Height: 1.676 m (5' 6\")       PMH:    Past Medical History:   Diagnosis Date     Cough 4/15/2014     Dysthymia 4/15/2014     Essential hypertension, benign 4/15/2014     Hypercholesteremia 4/15/2014     Paroxysmal supraventricular tachycardia (H24) 4/15/2014       SXHX:    Past Surgical History:   Procedure Laterality Date     ABDOMEN SURGERY  1972    Tubal ligation     APPENDECTOMY  Unsure    Removed when had vaginal hysterectomy.     COLONOSCOPY N/A 3/23/2017    Procedure: COMBINED COLONOSCOPY, SINGLE OR MULTIPLE BIOPSY/POLYPECTOMY BY BIOPSY;  Surgeon: Devante Sanchez MD;  Location:  GI     HYSTERECTOMY, PAP NO LONGER INDICATED          MEDS:    Current Outpatient Medications   Medication Sig Dispense Refill     alendronate (FOSAMAX) 70 MG tablet TAKE " 1 TABLET(70 MG) BY MOUTH EVERY 7 DAYS 12 tablet 3     amLODIPine (NORVASC) 5 MG tablet TAKE 1 TABLET(5 MG) BY MOUTH DAILY 90 tablet 3     carvedilol (COREG) 12.5 MG tablet TAKE 1/2 TABLET(6.25 MG) BY MOUTH TWICE DAILY WITH MEALS 90 tablet 1     Cholecalciferol (VITAMIN D) 2000 UNITS tablet Take 2,000 Units by mouth daily 100 tablet 3     estradiol (ESTRACE VAGINAL) 0.1 MG/GM vaginal cream Apply small amount to the vaginal opening and urethra M, W, F @ h.s. 42.5 g 3     fish oil-omega-3 fatty acids (OMEGA 3) 1000 MG capsule Take 1 g by mouth 2 times daily  90 capsule      Lactobacillus (PROBIOTIC ACIDOPHILUS) CAPS        Lutein 20 MG TABS        Multiple Vitamins-Minerals (EYE VITAMINS & MINERALS) TABS        omeprazole (PRILOSEC) 20 MG DR capsule Take 1 capsule (20 mg) by mouth daily 90 capsule 3     simvastatin (ZOCOR) 20 MG tablet TAKE 1/2 TABLET BY MOUTH EVERY NIGHT AT BEDTIME 90 tablet 2     vitamin C w/RADHA HIPS 500 MG tablet Take 1 tablet (500 mg) by mouth daily       zinc gluconate 50 MG tablet Take 1 tablet (50 mg) by mouth daily       No current facility-administered medications for this visit.       ALL:     Allergies   Allergen Reactions     Bupropion      Night sweats     Sertraline      Dizziness and hot flashes       FMH:    Family History   Problem Relation Age of Onset     Diabetes Father 60         66 yo     Asthma Father         Mild     Heart Disease Mother          66 yo      Hypertension Mother         , heart attack, age 67     Hyperlipidemia Mother      Heart Disease Brother          77 yo     Heart Disease Brother          78 yo     Heart Disease Brother          78 yo      Heart Disease Brother 67        Triple bypass     Family History Negative Sister          60 yo MVA      Family History Negative Sister      Family History Negative Daughter      Family History Negative Daughter      Diabetes Sister      Hypertension Sister          , car accident, age 61     Hyperlipidemia Sister      Diabetes Brother      Hypertension Brother         , car accident, age 61     Hyperlipidemia Brother      Hypertension Sister      Hyperlipidemia Sister      Hypertension Brother          following heart surgery, age 77     Hyperlipidemia Brother      Hypertension Brother      Hyperlipidemia Brother      Hypertension Brother         ,stroke after heart surgery     Hyperlipidemia Brother      Cerebrovascular Disease Brother         After heart surgery, from stroke, age 77     Other Cancer Daughter         Brain tumor, ; surgery.     Colon Cancer No family hx of        SocHx:    Social History     Socioeconomic History     Marital status:      Spouse name: Not on file     Number of children: Not on file     Years of education: Not on file     Highest education level: Not on file   Occupational History     Employer: RETIRED   Tobacco Use     Smoking status: Former     Current packs/day: 0.00     Types: Cigarettes     Start date: 1963     Quit date: 1968     Years since quittin.3     Smokeless tobacco: Never     Tobacco comments:     Smoked [age] yo.   Vaping Use     Vaping status: Never Used   Substance and Sexual Activity     Alcohol use: Yes     Alcohol/week: 0.0 standard drinks of alcohol     Comment: 1-2 glasses of wine or beer a week.     Drug use: No     Sexual activity: Yes     Partners: Male   Other Topics Concern      Service Not Asked     Blood Transfusions Not Asked     Caffeine Concern No     Comment: not caffeine      Occupational Exposure Not Asked     Hobby Hazards Not Asked     Sleep Concern Not Asked     Stress Concern Not Asked     Weight Concern Not Asked     Special Diet No     Comment: regular diet      Back Care Not Asked     Exercise Yes     Comment: 3 times a week at gym      Bike Helmet Not Asked     Seat Belt Not Asked     Self-Exams Not Asked     Parent/sibling w/ CABG,  MI or angioplasty before 65F 55M? Yes     Comment: Mother; heart attack age 53. brother Renato age 53 angioplast   Social History Narrative     Not on file     Social Determinants of Health     Financial Resource Strain: Low Risk  (4/19/2024)    Financial Resource Strain      Within the past 12 months, have you or your family members you live with been unable to get utilities (heat, electricity) when it was really needed?: No   Food Insecurity: Low Risk  (4/19/2024)    Food Insecurity      Within the past 12 months, did you worry that your food would run out before you got money to buy more?: No      Within the past 12 months, did the food you bought just not last and you didn t have money to get more?: No   Transportation Needs: Low Risk  (4/19/2024)    Transportation Needs      Within the past 12 months, has lack of transportation kept you from medical appointments, getting your medicines, non-medical meetings or appointments, work, or from getting things that you need?: No   Physical Activity: Not on file   Stress: Not on file   Social Connections: Not on file   Interpersonal Safety: Not on file   Housing Stability: Low Risk  (4/19/2024)    Housing Stability      Do you have housing? : Yes      Are you worried about losing your housing?: No           EXAMINATION:  Gen:   No apparent distress  Neuro:   A&Ox3, no deficits  Psych:    Answering questions appropriately for age and situation with normal affect  Head:    NCAT  Eye:    Visual scanning without deficit  Ear:    Response to auditory stimuli wnl  Lung:    Non-labored breathing on RA noted  Abd:    NTND per patient report  Lymph:    Neg for pitting/non-pitting edema BLE  Vasc:    Pulses palpable, CFT minimally delayed  Neuro:    Light touch sensation intact to all sensory nerve distributions without paresthesias  Derm:    Neg for nodules, lesions or ulcerations  MSK:    Adductovarus left fourth hammertoe, nearly fully reducible.  No MPJ pain seen.  Right first  MPJ pain/arthritis, crepitus with passive range of motion which is less than 40 degrees.  Calf:    Neg for redness, swelling or tenderness    Assessment:  78 year old female with reducible adductovarus left fourth hammertoe; right hallux limitus      Medical Decision Making/Plan:  Discussed etiologies, anatomy and options  1.  Reducible adductovarus left fourth hammertoe  -I personally reviewed and interpreted the patient's lower extremity history pertinent to today's visit, including imaging/labs, in preparation for initiating a treatment program.  -Our hammertoe handout was dispensed  -Comfortable accommodative shoes  -Our toe pad/spacer/splint handout was dispensed  -If this fails to provide sufficient relief, consider percutaneous flexor tenotomy.  A copy the post procedure instructions were dispensed for patient information and planning.  If she would like to proceed, she will schedule a 30-minute appointment.  We discussed that if this fails to provide sufficient relief, a formal hammertoe procedure would be considered      2.  Hallux limitus right foot  -Our hallux limitus handout was dispensed  -Comfortable stiff soled shoes  -OTC insert for arch support and stress relief on joint  -RICE/NSAID versus Tylenol as needed based on pain  -The next option to consider would be a steroid injection in the joint.  This can be done during a 15-minute clinic appointment      Follow up:  prn or sooner with acute issues      Patient's medical history was reviewed today      Donta Phoenix DPM FACFAS FACFAOM  Podiatric Foot & Ankle Surgeon  [facility]  [phone]    Disclaimer: This note consists of symbols derived from keyboarding, dictation and/or voice recognition software. As a result, there may be errors in the script that have gone undetected. Please consider this when interpreting information found in this chart.          Again, thank you for allowing me to participate in the care of your  patient.        Sincerely,        Donta Phoenix DPM, LEONOR

## 2024-05-28 ENCOUNTER — PATIENT OUTREACH (OUTPATIENT)
Dept: CARE COORDINATION | Facility: CLINIC | Age: 79
End: 2024-05-28
Payer: COMMERCIAL

## 2024-06-15 DIAGNOSIS — I47.10 SVT (SUPRAVENTRICULAR TACHYCARDIA) (H): ICD-10-CM

## 2024-06-15 DIAGNOSIS — R00.2 PALPITATIONS: ICD-10-CM

## 2024-06-18 RX ORDER — CARVEDILOL 12.5 MG/1
TABLET ORAL
Qty: 90 TABLET | Refills: 1 | Status: SHIPPED | OUTPATIENT
Start: 2024-06-18

## 2024-06-28 ENCOUNTER — PATIENT OUTREACH (OUTPATIENT)
Dept: CARE COORDINATION | Facility: CLINIC | Age: 79
End: 2024-06-28
Payer: COMMERCIAL

## 2024-07-29 SDOH — HEALTH STABILITY: PHYSICAL HEALTH: ON AVERAGE, HOW MANY DAYS PER WEEK DO YOU ENGAGE IN MODERATE TO STRENUOUS EXERCISE (LIKE A BRISK WALK)?: 3 DAYS

## 2024-07-29 SDOH — HEALTH STABILITY: PHYSICAL HEALTH: ON AVERAGE, HOW MANY MINUTES DO YOU ENGAGE IN EXERCISE AT THIS LEVEL?: 30 MIN

## 2024-07-29 ASSESSMENT — SOCIAL DETERMINANTS OF HEALTH (SDOH): HOW OFTEN DO YOU GET TOGETHER WITH FRIENDS OR RELATIVES?: MORE THAN THREE TIMES A WEEK

## 2024-08-02 ENCOUNTER — OFFICE VISIT (OUTPATIENT)
Dept: INTERNAL MEDICINE | Facility: CLINIC | Age: 79
End: 2024-08-02
Payer: COMMERCIAL

## 2024-08-02 VITALS
SYSTOLIC BLOOD PRESSURE: 138 MMHG | WEIGHT: 131 LBS | DIASTOLIC BLOOD PRESSURE: 80 MMHG | HEIGHT: 66 IN | HEART RATE: 87 BPM | TEMPERATURE: 97.9 F | BODY MASS INDEX: 21.05 KG/M2 | RESPIRATION RATE: 18 BRPM | OXYGEN SATURATION: 98 %

## 2024-08-02 DIAGNOSIS — R73.09 ELEVATED GLUCOSE: ICD-10-CM

## 2024-08-02 DIAGNOSIS — I47.10 PAROXYSMAL SUPRAVENTRICULAR TACHYCARDIA (H): ICD-10-CM

## 2024-08-02 DIAGNOSIS — M81.0 OSTEOPOROSIS, UNSPECIFIED OSTEOPOROSIS TYPE, UNSPECIFIED PATHOLOGICAL FRACTURE PRESENCE: ICD-10-CM

## 2024-08-02 DIAGNOSIS — R79.89 ELEVATED TSH: ICD-10-CM

## 2024-08-02 DIAGNOSIS — Z13.29 SCREENING FOR THYROID DISORDER: ICD-10-CM

## 2024-08-02 DIAGNOSIS — Z00.00 ENCOUNTER FOR ANNUAL WELLNESS EXAM IN MEDICARE PATIENT: Primary | ICD-10-CM

## 2024-08-02 DIAGNOSIS — H91.90 HEARING LOSS, UNSPECIFIED HEARING LOSS TYPE, UNSPECIFIED LATERALITY: ICD-10-CM

## 2024-08-02 DIAGNOSIS — I10 ESSENTIAL HYPERTENSION, BENIGN: ICD-10-CM

## 2024-08-02 DIAGNOSIS — R94.6 ABNORMAL RESULTS OF THYROID FUNCTION STUDIES: ICD-10-CM

## 2024-08-02 DIAGNOSIS — E78.00 HYPERCHOLESTEREMIA: ICD-10-CM

## 2024-08-02 DIAGNOSIS — K21.00 GASTROESOPHAGEAL REFLUX DISEASE WITH ESOPHAGITIS WITHOUT HEMORRHAGE: ICD-10-CM

## 2024-08-02 LAB
ALBUMIN SERPL BCG-MCNC: 4.5 G/DL (ref 3.5–5.2)
ALP SERPL-CCNC: 51 U/L (ref 40–150)
ALT SERPL W P-5'-P-CCNC: 19 U/L (ref 0–50)
ANION GAP SERPL CALCULATED.3IONS-SCNC: 9 MMOL/L (ref 7–15)
AST SERPL W P-5'-P-CCNC: 32 U/L (ref 0–45)
BASOPHILS # BLD AUTO: 0 10E3/UL (ref 0–0.2)
BASOPHILS NFR BLD AUTO: 0 %
BILIRUB SERPL-MCNC: 0.6 MG/DL
BUN SERPL-MCNC: 16.9 MG/DL (ref 8–23)
CALCIUM SERPL-MCNC: 10.1 MG/DL (ref 8.8–10.4)
CHLORIDE SERPL-SCNC: 105 MMOL/L (ref 98–107)
CHOLEST SERPL-MCNC: 166 MG/DL
CREAT SERPL-MCNC: 0.77 MG/DL (ref 0.51–0.95)
EGFRCR SERPLBLD CKD-EPI 2021: 77 ML/MIN/1.73M2
EOSINOPHIL # BLD AUTO: 0.4 10E3/UL (ref 0–0.7)
EOSINOPHIL NFR BLD AUTO: 5 %
ERYTHROCYTE [DISTWIDTH] IN BLOOD BY AUTOMATED COUNT: 12.6 % (ref 10–15)
FASTING STATUS PATIENT QL REPORTED: YES
FASTING STATUS PATIENT QL REPORTED: YES
GLUCOSE SERPL-MCNC: 89 MG/DL (ref 70–99)
HBA1C MFR BLD: 5.4 % (ref 0–5.6)
HCO3 SERPL-SCNC: 27 MMOL/L (ref 22–29)
HCT VFR BLD AUTO: 41.7 % (ref 35–47)
HDLC SERPL-MCNC: 52 MG/DL
HGB BLD-MCNC: 14.2 G/DL (ref 11.7–15.7)
IMM GRANULOCYTES # BLD: 0 10E3/UL
IMM GRANULOCYTES NFR BLD: 0 %
LDLC SERPL CALC-MCNC: 85 MG/DL
LYMPHOCYTES # BLD AUTO: 3.7 10E3/UL (ref 0.8–5.3)
LYMPHOCYTES NFR BLD AUTO: 45 %
MCH RBC QN AUTO: 30.9 PG (ref 26.5–33)
MCHC RBC AUTO-ENTMCNC: 34.1 G/DL (ref 31.5–36.5)
MCV RBC AUTO: 91 FL (ref 78–100)
MONOCYTES # BLD AUTO: 0.6 10E3/UL (ref 0–1.3)
MONOCYTES NFR BLD AUTO: 7 %
NEUTROPHILS # BLD AUTO: 3.5 10E3/UL (ref 1.6–8.3)
NEUTROPHILS NFR BLD AUTO: 42 %
NONHDLC SERPL-MCNC: 114 MG/DL
PLATELET # BLD AUTO: 335 10E3/UL (ref 150–450)
POTASSIUM SERPL-SCNC: 5.1 MMOL/L (ref 3.4–5.3)
PROT SERPL-MCNC: 8.1 G/DL (ref 6.4–8.3)
RBC # BLD AUTO: 4.6 10E6/UL (ref 3.8–5.2)
SODIUM SERPL-SCNC: 141 MMOL/L (ref 135–145)
TRIGL SERPL-MCNC: 147 MG/DL
TSH SERPL DL<=0.005 MIU/L-ACNC: 3.03 UIU/ML (ref 0.3–4.2)
WBC # BLD AUTO: 8.2 10E3/UL (ref 4–11)

## 2024-08-02 PROCEDURE — 83036 HEMOGLOBIN GLYCOSYLATED A1C: CPT | Performed by: INTERNAL MEDICINE

## 2024-08-02 PROCEDURE — 36415 COLL VENOUS BLD VENIPUNCTURE: CPT | Performed by: INTERNAL MEDICINE

## 2024-08-02 PROCEDURE — G0439 PPPS, SUBSEQ VISIT: HCPCS | Performed by: INTERNAL MEDICINE

## 2024-08-02 PROCEDURE — 99214 OFFICE O/P EST MOD 30 MIN: CPT | Mod: 25 | Performed by: INTERNAL MEDICINE

## 2024-08-02 PROCEDURE — 84443 ASSAY THYROID STIM HORMONE: CPT | Mod: GZ | Performed by: INTERNAL MEDICINE

## 2024-08-02 PROCEDURE — 80053 COMPREHEN METABOLIC PANEL: CPT | Performed by: INTERNAL MEDICINE

## 2024-08-02 PROCEDURE — 85025 COMPLETE CBC W/AUTO DIFF WBC: CPT | Performed by: INTERNAL MEDICINE

## 2024-08-02 PROCEDURE — 80061 LIPID PANEL: CPT | Performed by: INTERNAL MEDICINE

## 2024-08-02 RX ORDER — ALENDRONATE SODIUM 70 MG/1
70 TABLET ORAL
Qty: 12 TABLET | Refills: 3 | Status: SHIPPED | OUTPATIENT
Start: 2024-08-02

## 2024-08-02 NOTE — PROGRESS NOTES
Preventive Care Visit  [facility]  Lam Wilkerson MD, Internal Medicine  Aug 2, 2024      Assessment & Plan     Encounter for annual wellness exam in Medicare patient  Adult wellness plan reviewed.  - CBC with platelets and differential; Future    Hypercholesteremia  Patient does have a repeat lipid panel that is currently pending.  While awaiting results, she will continue her simvastatin at 20 mg per mouth per day.  Side effects statins were discussed.  Dietary modifications for improved cholesterol control were reviewed.  Further recommendations will be made once her test results have returned.    Paroxysmal supraventricular tachycardia (H24)  Chronic condition.  Patient will continue her carvedilol 6.25 mg twice per day for management.  Side effects of beta-blocker use were reviewed.    Essential hypertension, benign  Patient's repeat blood pressure was substantially improved.  Assuming no unexpected abnormalities on her outstanding medical panel, we will continue her amlodipine at 5 mg daily and carvedilol 6.25 mg twice per day.  Side effects of each medication were reviewed.  Patient was encouraged to monitor blood pressure outside the clinic setting.  - Comprehensive metabolic panel (BMP + Alb, Alk Phos, ALT, AST, Total. Bili, TP); Future    Gastroesophageal reflux disease with esophagitis without hemorrhage  Patient's reflux symptoms have been kept under good control with her use of omeprazole 20 mg by mouth per day.  We will continue this medication as currently prescribed.  Side effects PPIs were reviewed.  Reflux precautions were discussed.    Osteoporosis, unspecified osteoporosis type, unspecified pathological fracture presence  Patient will continue her alendronate at 70 mg by mouth once per week.  Side effects of alendronate use were reviewed.  We did also discuss appropriate calcium and vitamin D supplementation.  - alendronate (FOSAMAX) 70 MG tablet; Take 1 tablet  (70 mg) by mouth every 7 days    Hearing loss, unspecified hearing loss type, unspecified laterality  Audiology referral was placed for repeat assessment of her hearing and evaluation for hearing aids.  - Adult Audiology  Referral; Future    Screening for thyroid disorder  - TSH with free T4 reflex; Future    Patient has been advised of split billing requirements and indicates understanding: Yes        Counseling  Appropriate preventive services were addressed with this patient via screening, questionnaire, or discussion as appropriate for fall prevention, nutrition, physical activity, Tobacco-use cessation, weight loss and cognition.  Checklist reviewing preventive services available has been given to the patient.  Reviewed patient's diet, addressing concerns and/or questions.   She is at risk for lack of exercise and has been provided with information to increase physical activity for the benefit of her well-being.   She is at risk for psychosocial distress and has been provided with information to reduce risk.   The patient was provided with written information regarding signs of hearing loss.       See Patient Instructions    Subjective   Lisa is a 79 year old, presenting for the following:  Medicare Visit        8/2/2024    12:47 PM   Additional Questions   Roomed by KELLE Mendosa       Health Care Directive  Patient has a Health Care Directive on file  Advance care planning document is on file and is current.    Patient is an 79-year-old  female who presents to the clinic for her annual wellness exam. Patient reports stable appetite.  She is stooling and voiding per her usual routine.  She does have a history of hypertension for which she has been taking amlodipine 5 mg daily along with carvedilol 6.25 mg twice per day.  Patient does check her blood pressure intermittently outside the clinic setting.  Her systolic pressures are typically in the 130s.  She also takes 20 mg of simvastatin for  management of her cholesterol.  Patient is fasting for lab work today.  She does have a history of reflux that has been well-controlled with the use of omeprazole 20 mg daily.  Patient does have a history of osteoporosis with her last bone scan being in 2022, and she has been taking alendronate 70 mg once per week.  Patient does have a history of hearing loss, and she has been utilizing hearing aids.  Patient has not had her hearing aids or her hearing evaluated since 2021.  She is requesting a referral back to audiology for a repeat evaluation.          Hypertension Follow-up    Do you check your blood pressure regularly outside of the clinic? Yes   Are you following a low salt diet? Yes  Are your blood pressures ever more than 140 on the top number (systolic) OR more   than 90 on the bottom number (diastolic), for example 140/90? No        7/29/2024   General Health   How would you rate your overall physical health? Good   Feel stress (tense, anxious, or unable to sleep) Only a little      (!) STRESS CONCERN      7/29/2024   Nutrition   Diet: Regular (no restrictions)            7/29/2024   Exercise   Days per week of moderate/strenous exercise 3 days   Average minutes spent exercising at this level 30 min            7/29/2024   Social Factors   Frequency of gathering with friends or relatives More than three times a week   Worry food won't last until get money to buy more No   Food not last or not have enough money for food? No   Do you have housing? (Housing is defined as stable permanent housing and does not include staying ouside in a car, in a tent, in an abandoned building, in an overnight shelter, or couch-surfing.) Yes   Are you worried about losing your housing? No   Lack of transportation? No   Unable to get utilities (heat,electricity)? No            7/29/2024   Fall Risk   Fallen 2 or more times in the past year? No    No   Trouble with walking or balance? No    No       Multiple values from one day  are sorted in reverse-chronological order          2024   Activities of Daily Living- Home Safety   Needs help with the following daily activites None of the above   Safety concerns in the home None of the above            2024   Dental   Dentist two times every year? Yes            2024   Hearing Screening   Hearing concerns? (!) I NEED TO ASK PEOPLE TO SPEAK UP OR REPEAT THEMSELVES.            2024   Driving Risk Screening   Patient/family members have concerns about driving No            2024   General Alertness/Fatigue Screening   Have you been more tired than usual lately? No            2024   Urinary Incontinence Screening   Bothered by leaking urine in past 6 months No            2024   TB Screening   Were you born outside of the [place]? No          Today's PHQ-9 Score:       2024     4:41 PM   PHQ-9 SCORE   PHQ-9 Total Score MyChart 1 (Minimal depression)   PHQ-9 Total Score 1           2024   Substance Use   Alcohol more than 3/day or more than 7/wk No   Do you have a current opioid prescription? No   How severe/bad is pain from 1 to 10? 0/10 (No Pain)   Do you use any other substances recreationally? No        Social History     Tobacco Use    Smoking status: Former     Current packs/day: 0.00     Types: Cigarettes     Start date: 1963     Quit date: 1968     Years since quittin.5    Smokeless tobacco: Never    Tobacco comments:     Smoked [age] yo.   Vaping Use    Vaping status: Never Used   Substance Use Topics    Alcohol use: Yes     Alcohol/week: 0.0 standard drinks of alcohol     Comment: 1-2 glasses of wine or beer a week.    Drug use: No           1/15/2024   LAST FHS-7 RESULTS   1st degree relative breast or ovarian cancer No           Mammogram Screening - After age 74- determine frequency with patient based on health status, life expectancy and patient goals      Fracture Risk Assessment Tool  Link to Frax Calculator  Use the information  below to complete the Frax calculator  : 1945  Sex: female  Weight (kg): 59.4 kg (actual weight)  Height (cm): 167.6 cm  Previous Fragility Fracture:  No  History of parent with fractured hip:  No  Current Smoking:  No  Patient has been on glucocorticoids for more than 3 months (5mg/day or more): No  Rheumatoid Arthritis on Problem List:  No  Secondary Osteoporosis on Problem List:  No  Consumes 3 or more units of alcohol per day: No  Femoral Neck BMD (g/cm2)    Reviewed and updated as needed this visit by Provider                    Lab work is in process  Current providers sharing in care for this patient include:  Patient Care Team:  Soco Durham MD as PCP - General (Internal Medicine)  Soco Durham MD as Assigned PCP  Vee Zaragoza PA-C as Physician Assistant (Urology)  Vee Zaragoza PA-C as Assigned OBGYN Provider  Donta Phoenix DPM as Assigned Musculoskeletal Provider    The following health maintenance items are reviewed in Epic and correct as of today:  Health Maintenance   Topic Date Due    RSV VACCINE (Pregnancy & 60+) (1 - 1-dose 60+ series) Never done    COVID-19 Vaccine (2023- season) 2024    MEDICARE ANNUAL WELLNESS VISIT  2024    LIPID  2024    ANNUAL REVIEW OF HM ORDERS  2024    Pneumococcal Vaccine: 65+ Years (3 of 3 - PPSV23 or PCV20) 2026 (Originally 2016)    INFLUENZA VACCINE (1) 2024    PHQ-9  10/26/2024    FALL RISK ASSESSMENT  2025    ADVANCE CARE PLANNING  2029    DTAP/TDAP/TD IMMUNIZATION (3 - Td or Tdap) 2032    DEXA  2037    DEPRESSION ACTION PLAN  Completed    ZOSTER IMMUNIZATION  Completed    IPV IMMUNIZATION  Aged Out    HPV IMMUNIZATION  Aged Out    MENINGITIS IMMUNIZATION  Aged Out    RSV MONOCLONAL ANTIBODY  Aged Out    MAMMO SCREENING  Discontinued    COLORECTAL CANCER SCREENING  Discontinued    LUNG CANCER SCREENING  Discontinued         Review of Systems  CONSTITUTIONAL:  "NEGATIVE for fever, chills, change in weight  INTEGUMENTARY/SKIN: NEGATIVE for worrisome rashes, moles or lesions  ENT/MOUTH: NEGATIVE for ear, mouth and throat problems  RESP: NEGATIVE for significant cough or SOB  CV: NEGATIVE for chest pain, palpitations or peripheral edema  GI: NEGATIVE for nausea, abdominal pain, heartburn, or change in bowel habits  : NEGATIVE for frequency, dysuria, or hematuria  MUSCULOSKELETAL: NEGATIVE for significant arthralgias or myalgia  NEURO: NEGATIVE for weakness, dizziness or paresthesias       Objective    Exam  Blood pressure 138/80, pulse 87, temperature 97.9  F (36.6  C), resp. rate 18, height 1.676 m (5' 6\"), weight 59.4 kg (131 lb), SpO2 98%, not currently breastfeeding.      Physical Exam  Vitals reviewed.   Constitutional:       Appearance: Normal appearance.   HENT:      Head: Normocephalic and atraumatic.      Right Ear: Tympanic membrane, ear canal and external ear normal.      Left Ear: Tympanic membrane, ear canal and external ear normal.      Mouth/Throat:      Mouth: Mucous membranes are moist.      Pharynx: Oropharynx is clear.   Eyes:      Extraocular Movements: Extraocular movements intact.      Conjunctiva/sclera: Conjunctivae normal.      Pupils: Pupils are equal, round, and reactive to light.   Cardiovascular:      Rate and Rhythm: Normal rate and regular rhythm.      Pulses: Normal pulses.      Heart sounds: Normal heart sounds.   Pulmonary:      Effort: Pulmonary effort is normal.      Breath sounds: Normal breath sounds.   Abdominal:      General: Bowel sounds are normal.      Palpations: Abdomen is soft.   Musculoskeletal:      Cervical back: Normal range of motion and neck supple.   Skin:     General: Skin is warm and dry.      Capillary Refill: Capillary refill takes less than 2 seconds.   Neurological:      General: No focal deficit present.      Mental Status: She is alert and oriented to person, place, and time.           Signed Electronically by: " Lam Wilkerson MD

## 2024-08-02 NOTE — PATIENT INSTRUCTIONS
Patient Education   Well Visit, Over 65: Care Instructions  Well visits can help you stay healthy. Your doctor has checked your overall health and may have suggested ways to take good care of yourself. Your doctor also may have recommended tests. You can help prevent illness with healthy eating, good sleep, vaccinations, regular exercise, and other steps.    Get the tests that you and your doctor decide on. Depending on your age and risks, examples might include hearing tests as well as screening for colon, breast, and lung cancer. Screening helps find diseases before any symptoms appear.   Eat healthy foods. Choose fruits, vegetables, whole grains, lean protein, and low-fat dairy foods. Limit saturated fat, and reduce salt.     Limit alcohol. Men should have no more than 2 drinks a day. Women should have no more than 1. For some people, no alcohol is the best choice.   Exercise. It can help prevent falls. Get at least 30 minutes of exercise on most days of the week. Walking, yoga, and jevon chi can be good choices.     Reach and stay at your healthy weight. This will lower your risk for many health problems.   Take care of your mental health. Try to stay connected with friends, family, and community, and find ways to manage stress.     If you're feeling depressed or hopeless, talk to someone. A counselor can help. If you don't have a counselor, talk to your doctor.   Talk to your doctor if you think you may have a problem with alcohol or drug use. This includes prescription medicines and illegal drugs.     Avoid tobacco and nicotine: Don't smoke, vape, or chew. If you need help quitting, talk to your doctor.   Practice safer sex. Getting tested, using condoms or dental dams, and limiting sex partners can help prevent STIs.     Make an advance directive. This is a legal way to tell your family and doctor what you want to happen at the end of your life or when you can't speak for yourself.   Prevent problems where you  "can. Protect your skin from too much sun, wash your hands, brush your teeth twice a day, and wear a seat belt in the car.   Where can you learn more?  Go to https://www.[employer].net/patiented  Enter K859 in the search box to learn more about \"Well Visit, Over 65: Care Instructions.\"  Current as of: August 6, 2023               Content Version: 14.0    3898-4027 [employer].   Care instructions adapted under license by your healthcare professional. If you have questions about a medical condition or this instruction, always ask your healthcare professional. [employer], [employer] disclaims any warranty or liability for your use of this information.         "

## 2024-08-02 NOTE — PROGRESS NOTES
08/02/24 1319   Mini Cog Assessment   Clock Draw Score 2 Normal   3 Item Recall 3 objects recalled   Mini Cog Total Score 5

## 2024-08-03 DIAGNOSIS — E78.00 HYPERCHOLESTEREMIA: ICD-10-CM

## 2024-08-03 RX ORDER — SIMVASTATIN 20 MG
10 TABLET ORAL AT BEDTIME
Qty: 45 TABLET | Refills: 3 | Status: SHIPPED | OUTPATIENT
Start: 2024-08-03

## 2024-09-03 ENCOUNTER — PATIENT OUTREACH (OUTPATIENT)
Dept: INTERNAL MEDICINE | Facility: CLINIC | Age: 79
End: 2024-09-03
Payer: COMMERCIAL

## 2024-09-03 NOTE — TELEPHONE ENCOUNTER
Patient Quality Outreach    Patient is due for the following:       Topic Date Due    Flu Vaccine (1) 09/01/2024    COVID-19 Vaccine (6 - 2023-24 season) 09/01/2024       Next Steps:   No follow up needed at this time.    Type of outreach:    Chart review performed, no outreach needed.      Questions for provider review:    None           Yoselyn Sorenson LPN  Chart routed to none.

## 2024-09-19 DIAGNOSIS — E78.00 HYPERCHOLESTEREMIA: ICD-10-CM

## 2024-09-19 RX ORDER — SIMVASTATIN 20 MG
10 TABLET ORAL AT BEDTIME
Qty: 90 TABLET | OUTPATIENT
Start: 2024-09-19

## 2024-10-07 ENCOUNTER — MYC REFILL (OUTPATIENT)
Dept: INTERNAL MEDICINE | Facility: CLINIC | Age: 79
End: 2024-10-07
Payer: COMMERCIAL

## 2024-10-07 DIAGNOSIS — E78.00 HYPERCHOLESTEREMIA: ICD-10-CM

## 2024-10-07 RX ORDER — SIMVASTATIN 20 MG
10 TABLET ORAL AT BEDTIME
Qty: 45 TABLET | Refills: 3 | OUTPATIENT
Start: 2024-10-07

## 2024-11-07 ENCOUNTER — OFFICE VISIT (OUTPATIENT)
Dept: AUDIOLOGY | Facility: CLINIC | Age: 79
End: 2024-11-07
Payer: COMMERCIAL

## 2024-11-07 DIAGNOSIS — H90.3 SENSORINEURAL HEARING LOSS, BILATERAL: Primary | ICD-10-CM

## 2024-11-07 DIAGNOSIS — H91.90 HEARING LOSS, UNSPECIFIED HEARING LOSS TYPE, UNSPECIFIED LATERALITY: ICD-10-CM

## 2024-11-07 NOTE — PROGRESS NOTES
AUDIOLOGY REPORT    SUBJECTIVE:  Lisa Lopez is a 79 year old female who was seen in the Audiology Clinic at the [facility] for audiologic evaluation, referred by Lam Wilkerson M.D. .The patient has been seen previously in this clinic on 6/23/21 for assessment and results indicated a bilateral sensorineural hearing loss. She currently wears [employer]ia Pure Charge and Go 3NX fit on 1/21/2019. She reports that the devices don't seem to be charging as they are not working. The patient reports that hearing seems to be relatively stable. The patient denies  bilateral tinnitus, bilateral otalgia, bilateral drainage, bilateral aural fullness, and dizziness.  The patient notes difficulty with communication in a variety of listening situations.     OBJECTIVE:  Fall Risk Screen:  1. Have you fallen two or more times in the past year? No  2. Have you fallen and had an injury in the past year? No    Timed Up and Go Score (in seconds): not tested  Is patient a fall risk? No  Referral initiated: No  Fall Risk Assessment Completed by Audiology    Otoscopic exam indicates ears are clear of cerumen bilaterally     Pure Tone Thresholds assessed using conventional audiometry with good  reliability from 250-8000 Hz bilaterally using insert earphones and circumaural headphones     RIGHT:  normal sloping to profound sensorineural hearing loss    LEFT:    normal sloping to profound sensorineural hearing loss    Tympanogram:    RIGHT: normal eardrum mobility    LEFT:   normal eardrum mobility    Speech Reception Threshold:    RIGHT: 25 dB HL    LEFT:   30 dB HL    Word Recognition Score:     RIGHT: 100% at 65 dB HL using NU-6 recorded word list.    LEFT:   100% at 70 dB HL using NU-6 recorded word list.    The hearing aids were placed in a clinic  at the start of the appointment and after being left to chare for about 25 minutes they did turn on. The devices did have a  significantly amount of debris on the microphones and in the domes. The microphones were thoroughly cleaned, the wax traps and domes were also changed. Following this, the devices sounded crisp and clear with no distortion or weakness noted. When placed in the patient's personal , they also appeared to be charging appropriately.     This was all reviewed with the patient. The cost of a new  or getting the batteries in her current devices replaced was reviewed. It was discussed that there was significant debris in the hearing aids which may have prevented the sound from coming through the  making seem as though they were not on. The patient expressed understanding and was happy to have the hearing aids back. She knows options for the future should the cleaning not solve the issue.      ASSESSMENT:     ICD-10-CM    1. Hearing loss, unspecified hearing loss type, unspecified laterality  H91.90 Adult Audiology [facility] Referral          Compared to patient's previous audiogram dated 6/23/21, hearing has remained stable bilaterally. A hearing aid check was performed today. Today s results were discussed with the patient in detail.     PLAN:  Patient was counseled regarding hearing loss and impact on communication.  Patient continues to be a good candidate for amplification at this time.It is recommended that the patient repeat the hearing evaluation in 2-3 years, sooner if concerns arise. It is recommended that she return in 6-8 months for general cleaning and maintenance of the hearing aids, sooner if concerns.  Please call this clinic with questions regarding these results or recommendations.        Latricia Gillis, [place]  Audiologist  [place] License  #[id number]

## 2024-11-26 ASSESSMENT — PATIENT HEALTH QUESTIONNAIRE - PHQ9: SUM OF ALL RESPONSES TO PHQ QUESTIONS 1-9: 7

## 2025-01-02 DIAGNOSIS — R00.2 PALPITATIONS: ICD-10-CM

## 2025-01-02 DIAGNOSIS — I10 ESSENTIAL HYPERTENSION, BENIGN: ICD-10-CM

## 2025-01-02 DIAGNOSIS — I47.10 SVT (SUPRAVENTRICULAR TACHYCARDIA) (H): ICD-10-CM

## 2025-01-02 RX ORDER — CARVEDILOL 12.5 MG/1
TABLET ORAL
Qty: 90 TABLET | Refills: 1 | Status: SHIPPED | OUTPATIENT
Start: 2025-01-02

## 2025-01-02 RX ORDER — AMLODIPINE BESYLATE 5 MG/1
TABLET ORAL
Qty: 90 TABLET | Refills: 1 | Status: SHIPPED | OUTPATIENT
Start: 2025-01-02

## 2025-01-29 ENCOUNTER — HOSPITAL ENCOUNTER (OUTPATIENT)
Dept: MAMMOGRAPHY | Facility: CLINIC | Age: 80
Discharge: HOME | End: 2025-01-29
Attending: INTERNAL MEDICINE
Payer: COMMERCIAL

## 2025-01-29 DIAGNOSIS — Z12.31 VISIT FOR SCREENING MAMMOGRAM: ICD-10-CM

## 2025-01-29 PROCEDURE — 77063 BREAST TOMOSYNTHESIS BI: CPT

## 2025-03-24 DIAGNOSIS — K21.00 GASTROESOPHAGEAL REFLUX DISEASE WITH ESOPHAGITIS WITHOUT HEMORRHAGE: ICD-10-CM

## 2025-03-24 RX ORDER — OMEPRAZOLE 20 MG/1
20 CAPSULE, DELAYED RELEASE ORAL DAILY
Qty: 90 CAPSULE | Refills: 1 | Status: SHIPPED | OUTPATIENT
Start: 2025-03-24

## 2025-03-26 ENCOUNTER — MYC REFILL (OUTPATIENT)
Dept: INTERNAL MEDICINE | Facility: CLINIC | Age: 80
End: 2025-03-26
Payer: COMMERCIAL

## 2025-03-26 DIAGNOSIS — K21.00 GASTROESOPHAGEAL REFLUX DISEASE WITH ESOPHAGITIS WITHOUT HEMORRHAGE: ICD-10-CM

## 2025-03-26 RX ORDER — OMEPRAZOLE 20 MG/1
20 CAPSULE, DELAYED RELEASE ORAL DAILY
Qty: 90 CAPSULE | Refills: 1 | OUTPATIENT
Start: 2025-03-26

## 2025-04-16 ENCOUNTER — DOCUMENTATION ONLY (OUTPATIENT)
Dept: AUDIOLOGY | Facility: CLINIC | Age: 80
End: 2025-04-16
Payer: COMMERCIAL

## 2025-04-16 DIAGNOSIS — H90.3 SENSORINEURAL HEARING LOSS, BILATERAL: Primary | ICD-10-CM

## 2025-04-17 NOTE — PROGRESS NOTES
Walk-in hearing aid services on 4/16/25: The patient indicated her hearing aids didn't seem to be working. Examination found the domes were both occluded with wax. The hearing aids were cleaned and the  filters and domes were replaced. Afterwards a listening check found the hearing aids to be working properly and they were returned to the patient.    An out of warranty clean & check charge is being billed for today's services.    Ayad Anna  Audiology Clinic Assistant    I delegated the hearing aid service to the audiology assistant. I approve of the services provided.    Mally Markham, [facility]  Licensed Audiologist  [place] License #[id number]

## 2025-07-03 DIAGNOSIS — I47.10 SVT (SUPRAVENTRICULAR TACHYCARDIA): ICD-10-CM

## 2025-07-03 DIAGNOSIS — R00.2 PALPITATIONS: ICD-10-CM

## 2025-07-03 DIAGNOSIS — I10 ESSENTIAL HYPERTENSION, BENIGN: ICD-10-CM

## 2025-07-03 RX ORDER — CARVEDILOL 12.5 MG/1
TABLET ORAL
Qty: 90 TABLET | Refills: 0 | Status: SHIPPED | OUTPATIENT
Start: 2025-07-03

## 2025-07-03 RX ORDER — AMLODIPINE BESYLATE 5 MG/1
5 TABLET ORAL DAILY
Qty: 90 TABLET | Refills: 0 | Status: SHIPPED | OUTPATIENT
Start: 2025-07-03

## 2025-07-07 ENCOUNTER — RESULTS FOLLOW-UP (OUTPATIENT)
Dept: FAMILY MEDICINE | Facility: CLINIC | Age: 80
End: 2025-07-07

## 2025-07-07 ENCOUNTER — OFFICE VISIT (OUTPATIENT)
Dept: FAMILY MEDICINE | Facility: CLINIC | Age: 80
End: 2025-07-07
Payer: COMMERCIAL

## 2025-07-07 VITALS
SYSTOLIC BLOOD PRESSURE: 142 MMHG | WEIGHT: 127.6 LBS | RESPIRATION RATE: 20 BRPM | BODY MASS INDEX: 20.51 KG/M2 | HEART RATE: 59 BPM | HEIGHT: 66 IN | DIASTOLIC BLOOD PRESSURE: 70 MMHG | TEMPERATURE: 98 F | OXYGEN SATURATION: 95 %

## 2025-07-07 DIAGNOSIS — R94.6 ABNORMAL FINDING ON THYROID FUNCTION TEST: Primary | ICD-10-CM

## 2025-07-07 DIAGNOSIS — E78.00 HYPERCHOLESTEREMIA: ICD-10-CM

## 2025-07-07 DIAGNOSIS — R53.83 OTHER FATIGUE: ICD-10-CM

## 2025-07-07 DIAGNOSIS — I10 ESSENTIAL HYPERTENSION, BENIGN: ICD-10-CM

## 2025-07-07 DIAGNOSIS — Z23 NEED FOR VACCINATION: ICD-10-CM

## 2025-07-07 DIAGNOSIS — G47.09 OTHER INSOMNIA: Primary | ICD-10-CM

## 2025-07-07 LAB
ALBUMIN UR-MCNC: NEGATIVE MG/DL
ANION GAP SERPL CALCULATED.3IONS-SCNC: 8 MMOL/L (ref 7–15)
APPEARANCE UR: CLEAR
BILIRUB UR QL STRIP: NEGATIVE
BUN SERPL-MCNC: 16.2 MG/DL (ref 8–23)
CALCIUM SERPL-MCNC: 9.6 MG/DL (ref 8.8–10.4)
CHLORIDE SERPL-SCNC: 109 MMOL/L (ref 98–107)
CHOLEST SERPL-MCNC: 177 MG/DL
COLOR UR AUTO: YELLOW
CREAT SERPL-MCNC: 0.77 MG/DL (ref 0.51–0.95)
EGFRCR SERPLBLD CKD-EPI 2021: 77 ML/MIN/1.73M2
ERYTHROCYTE [DISTWIDTH] IN BLOOD BY AUTOMATED COUNT: 12.8 % (ref 10–15)
FASTING STATUS PATIENT QL REPORTED: NORMAL
GLUCOSE SERPL-MCNC: 100 MG/DL (ref 70–99)
GLUCOSE UR STRIP-MCNC: NEGATIVE MG/DL
HCO3 SERPL-SCNC: 25 MMOL/L (ref 22–29)
HCT VFR BLD AUTO: 40.6 % (ref 35–47)
HDLC SERPL-MCNC: 54 MG/DL
HGB BLD-MCNC: 13.9 G/DL (ref 11.7–15.7)
HGB UR QL STRIP: ABNORMAL
KETONES UR STRIP-MCNC: NEGATIVE MG/DL
LDLC SERPL CALC-MCNC: 98 MG/DL
LEUKOCYTE ESTERASE UR QL STRIP: NEGATIVE
MCH RBC QN AUTO: 31.2 PG (ref 26.5–33)
MCHC RBC AUTO-ENTMCNC: 34.2 G/DL (ref 31.5–36.5)
MCV RBC AUTO: 91 FL (ref 78–100)
NITRATE UR QL: NEGATIVE
NONHDLC SERPL-MCNC: 123 MG/DL
PH UR STRIP: 7 [PH] (ref 5–7)
PLATELET # BLD AUTO: 369 10E3/UL (ref 150–450)
POTASSIUM SERPL-SCNC: 4.7 MMOL/L (ref 3.4–5.3)
RBC # BLD AUTO: 4.45 10E6/UL (ref 3.8–5.2)
RBC #/AREA URNS AUTO: ABNORMAL /HPF
SODIUM SERPL-SCNC: 142 MMOL/L (ref 135–145)
SP GR UR STRIP: 1.01 (ref 1–1.03)
SQUAMOUS #/AREA URNS AUTO: ABNORMAL /LPF
T4 FREE SERPL-MCNC: 0.95 NG/DL (ref 0.9–1.7)
TRIGL SERPL-MCNC: 125 MG/DL
TSH SERPL DL<=0.005 MIU/L-ACNC: 4.4 UIU/ML (ref 0.3–4.2)
UROBILINOGEN UR STRIP-ACNC: 0.2 E.U./DL
WBC # BLD AUTO: 6.6 10E3/UL (ref 4–11)
WBC #/AREA URNS AUTO: ABNORMAL /HPF

## 2025-07-07 PROCEDURE — 3078F DIAST BP <80 MM HG: CPT | Performed by: FAMILY MEDICINE

## 2025-07-07 PROCEDURE — 84443 ASSAY THYROID STIM HORMONE: CPT | Performed by: FAMILY MEDICINE

## 2025-07-07 PROCEDURE — 93000 ELECTROCARDIOGRAM COMPLETE: CPT | Performed by: FAMILY MEDICINE

## 2025-07-07 PROCEDURE — 84439 ASSAY OF FREE THYROXINE: CPT | Performed by: FAMILY MEDICINE

## 2025-07-07 PROCEDURE — 3077F SYST BP >= 140 MM HG: CPT | Performed by: FAMILY MEDICINE

## 2025-07-07 PROCEDURE — G2211 COMPLEX E/M VISIT ADD ON: HCPCS | Performed by: FAMILY MEDICINE

## 2025-07-07 PROCEDURE — 80061 LIPID PANEL: CPT | Performed by: FAMILY MEDICINE

## 2025-07-07 PROCEDURE — 80048 BASIC METABOLIC PNL TOTAL CA: CPT | Performed by: FAMILY MEDICINE

## 2025-07-07 PROCEDURE — 99214 OFFICE O/P EST MOD 30 MIN: CPT | Performed by: FAMILY MEDICINE

## 2025-07-07 PROCEDURE — 81001 URINALYSIS AUTO W/SCOPE: CPT | Performed by: FAMILY MEDICINE

## 2025-07-07 PROCEDURE — 85027 COMPLETE CBC AUTOMATED: CPT | Performed by: FAMILY MEDICINE

## 2025-07-07 PROCEDURE — 36415 COLL VENOUS BLD VENIPUNCTURE: CPT | Performed by: FAMILY MEDICINE

## 2025-07-07 ASSESSMENT — PATIENT HEALTH QUESTIONNAIRE - PHQ9
10. IF YOU CHECKED OFF ANY PROBLEMS, HOW DIFFICULT HAVE THESE PROBLEMS MADE IT FOR YOU TO DO YOUR WORK, TAKE CARE OF THINGS AT HOME, OR GET ALONG WITH OTHER PEOPLE: NOT DIFFICULT AT ALL
SUM OF ALL RESPONSES TO PHQ QUESTIONS 1-9: 1
SUM OF ALL RESPONSES TO PHQ QUESTIONS 1-9: 1

## 2025-07-07 NOTE — PROGRESS NOTES
Assessment & Plan     Frequent urination and insomnia:  - unsure of etiology, can be related to anxiety   At this time, I recommend close motioning, will order - Labs  (stat) and urine sample ordered to check kidney function and electrolytes.  - Melatonin 3 mg over-the-counter suggested for sleep if needed.    Dizziness and dry mouth, follow up with labs, symptoms has resolved.   .    Essential hypertension, benign:  - Blood pressure checked during the visit and is not a concern at this time.  - Continue current medications (amlodipine 5 mg daily, carvedilol twice daily).  - Monitor blood pressure.    Other fatigue:.  - Encourage rest and naps as needed, especially after a poor night's sleep.    Balance issues:  That has been going for a long time,   - Recommend balance exercises and strengthening leg muscles; encourage increased walking and use of online resources (e.g., [employer]) for exercises.    EKG ordered:  - EKG ordered to check heart function due to family history and to rule out cardiac causes for symptoms.    Consent was obtained from the patient to use an AI documentation tool in the creation of this note.      32 minutes spent by me on the date of the encounter doing chart review, history and exam, documentation and further activities per the note        Follow up in 1 to 2 days if symptoms persist, sooner if symptoms worsen or new ones develops, pt may contact us over the phone for any questions or concerns.      Subjective   Lisa is a 79 year old, presenting for the following health issues:  Dizziness (C/O shaky, dizzy, frequent urination - Every 15 minutes. )  History of Present Illness-  - Name: Lisa Lopez, age: 79 years, female  - On the night prior to the encounter, the patient was unable to sleep despite being busy all day, which included walking a couple of miles, sewing, and doing crafts.  - She experienced frequent urination about every 10-15 minutes throughout the night, which she  "reports has only happened once before; she typically gets up a couple of times per night due to age, but this frequency was unusual.  - During the night, she felt shaky, dizzy, and had a really dry mouth that persisted the whole night.  - She only slept about one hour during the night.  - She attributed part of the sleep difficulty to frequent urination and possibly nerves, especially as she started to worry about what was wrong.  - She denied symptoms consistent with bladder infection, stating familiarity with those symptoms and that this episode was different.  - She has ongoing issues with bowel movements, describing the need to watch her diet closely due to a \"touchy\" or overactive colon; she reports loose stools but never diarrhea, and uses Imodium as needed, but did not take it the night before or on the day of the encounter.  - She reported feeling \"off\" in the morning, with decreased water intake due to this sensation; she denied nausea, describing the feeling as just \"off.\"  - She noted improvement in how she felt after taking a shower.  - She described occasional trouble with balance, which began after a trip to [place] for the [place] race involving extensive walking (approximately 6 miles per day); balance issues persist but are not present every day.  - She expressed concern that symptoms could be related to blood pressure, though she has never had high blood pressure and is on amlodipine 5 mg and carvedilol twice daily, continued since an episode of SVT years ago.  - She takes a once-weekly pill for bone health, cholesterol medication (half of 10 mg at bedtime), omeprazole, and various vitamins and supplements; there have been no recent changes in her medications.  - She reported significant recent emotional stress due to the deaths of her sister and best friend two weeks prior, as well as the deaths of two parents of her daughter's college basketball teammates.  - She stated that she has been " "grieving, sending cards, and thinking about the losses frequently, which may be contributing to her symptoms, and acknowledged that \"maybe it's nerves.\"    History of Present Illness       Reason for visit:  Could not sleep, dizzy, very frequent urination, light headed, just very off for me!  Symptom onset:  1-3 days ago  Symptoms include:  Dizzy and  just do not feel good.  Symptom intensity:  Moderate  Symptom progression:  Staying the same  Had these symptoms before:  No  What makes it worse:  No  What makes it better:  No   She is taking medications regularly.                  Review of Systems  Constitutional, HEENT, cardiovascular, pulmonary, gi and gu systems are negative, except as otherwise noted.      Objective    BP (!) 170/74 (BP Location: Left arm, Patient Position: Sitting, Cuff Size: Child)   Temp 98  F (36.7  C) (Oral)   Resp 20   Ht 1.664 m (5' 5.5\")   Wt 57.9 kg (127 lb 9.6 oz)   LMP  (LMP Unknown)   BMI 20.91 kg/m    Body mass index is 20.91 kg/m .  Physical Exam   GENERAL: alert and no distress  EYES: Eyes grossly normal to inspection, PERRL and conjunctivae and sclerae normal  HENT: ear canals and TM's normal, nose and mouth without ulcers or lesions  NECK: no adenopathy, no asymmetry, masses, or scars  RESP: lungs clear to auscultation - no rales, rhonchi or wheezes  CV: regular rate and rhythm, normal S1 S2, no S3 or S4, no murmur, click or rub, no peripheral edema  ABDOMEN: soft, nontender, no hepatosplenomegaly, no masses and bowel sounds normal  NEURO: Normal strength and tone, sensory exam grossly normal, mentation intact, cranial nerves 2-12 intact, gait normal including heel/toe/tandem walking, Romberg normal, and rapid alternating movements normal  PSYCH: mentation appears normal, affect normal/bright     EKG : normal sinus rhythm, no ST/T changes, normal axis.        Signed Electronically by: Stef Piper MD    "

## 2025-07-07 NOTE — TELEPHONE ENCOUNTER
RN spoke to patient     Reviewed results note from Dr. Piper     RN scheduled lab follow up appt in 2 months per request of provider  Sep 05, 2025 8:30 AM  LAB with CR LAB  [facility] ([facility] ) [phone]     Patient has no questions at this time, will return call if any questions/concerns arise     Cha Vincent, Registered Nurse  [facility]

## 2025-07-07 NOTE — TELEPHONE ENCOUNTER
Please call Lisa,   Labs are all normal, but thryoid is only slightly low, so repeat the labs in 2 months (it is not the cause of hier symptoms )

## 2025-07-09 DIAGNOSIS — M81.0 OSTEOPOROSIS, UNSPECIFIED OSTEOPOROSIS TYPE, UNSPECIFIED PATHOLOGICAL FRACTURE PRESENCE: ICD-10-CM

## 2025-07-09 RX ORDER — ALENDRONATE SODIUM 70 MG/1
TABLET ORAL
Qty: 12 TABLET | Refills: 0 | Status: SHIPPED | OUTPATIENT
Start: 2025-07-09

## (undated) DEVICE — ENDO TRAP POLYP QUICK CATCH 710201

## (undated) DEVICE — Device

## (undated) DEVICE — KIT ENDO TURNOVER/PROCEDURE W/CLEAN A SCOPE LINERS 103888

## (undated) DEVICE — ESU GROUND PAD ADULT W/CORD E7507

## (undated) DEVICE — ENDO SNARE HEXAGONAL ISNARE 2.5X4.0CM W/25GA NDL

## (undated) DEVICE — ENDO SNARE POLYPECTOMY OVAL 15MM LOOP SD-240U-15

## (undated) DEVICE — ENDO FORCEP BX CAPTURA JUMBO SPIKE 2.8MMX230CM G53042

## (undated) RX ORDER — FENTANYL CITRATE 50 UG/ML
INJECTION, SOLUTION INTRAMUSCULAR; INTRAVENOUS
Status: DISPENSED
Start: 2017-03-23

## (undated) RX ORDER — FENTANYL CITRATE 50 UG/ML
INJECTION, SOLUTION INTRAMUSCULAR; INTRAVENOUS
Status: DISPENSED
Start: 2019-10-16

## (undated) RX ORDER — FENTANYL CITRATE 50 UG/ML
INJECTION, SOLUTION INTRAMUSCULAR; INTRAVENOUS
Status: DISPENSED
Start: 2021-02-05